# Patient Record
Sex: MALE | Race: WHITE | NOT HISPANIC OR LATINO | Employment: FULL TIME | URBAN - METROPOLITAN AREA
[De-identification: names, ages, dates, MRNs, and addresses within clinical notes are randomized per-mention and may not be internally consistent; named-entity substitution may affect disease eponyms.]

---

## 2017-01-24 DIAGNOSIS — M25.512 ACUTE PAIN OF LEFT SHOULDER: Primary | ICD-10-CM

## 2017-01-25 ENCOUNTER — HOSPITAL ENCOUNTER (OUTPATIENT)
Dept: RADIOLOGY | Facility: HOSPITAL | Age: 52
Discharge: HOME | End: 2017-01-25
Attending: ORTHOPAEDIC SURGERY
Payer: COMMERCIAL

## 2017-01-25 ENCOUNTER — OFFICE VISIT (OUTPATIENT)
Dept: ORTHOPEDICS | Facility: CLINIC | Age: 52
End: 2017-01-25
Payer: COMMERCIAL

## 2017-01-25 VITALS — WEIGHT: 130 LBS | BODY MASS INDEX: 19.26 KG/M2 | HEIGHT: 69 IN

## 2017-01-25 DIAGNOSIS — M25.512 ACUTE PAIN OF LEFT SHOULDER: ICD-10-CM

## 2017-01-25 DIAGNOSIS — M75.82 ROTATOR CUFF TENDINITIS, LEFT: Primary | ICD-10-CM

## 2017-01-25 PROCEDURE — 99203 OFFICE O/P NEW LOW 30 MIN: CPT | Mod: S$GLB,,, | Performed by: ORTHOPAEDIC SURGERY

## 2017-01-25 PROCEDURE — 73030 X-RAY EXAM OF SHOULDER: CPT | Mod: TC,PN,LT

## 2017-01-25 PROCEDURE — 99999 PR PBB SHADOW E&M-EST. PATIENT-LVL II: CPT | Mod: PBBFAC,,, | Performed by: ORTHOPAEDIC SURGERY

## 2017-01-25 PROCEDURE — 73030 X-RAY EXAM OF SHOULDER: CPT | Mod: 26,LT,, | Performed by: RADIOLOGY

## 2017-01-25 PROCEDURE — 1159F MED LIST DOCD IN RCRD: CPT | Mod: S$GLB,,, | Performed by: ORTHOPAEDIC SURGERY

## 2017-01-25 RX ORDER — NAPROXEN 500 MG/1
500 TABLET ORAL
COMMUNITY
End: 2017-04-18

## 2017-01-25 NOTE — MR AVS SNAPSHOT
"    [facility]  [address] [place] [address]  [place] [address]  Phone: [phone]                  Cornelius Couch   2017 10:00 AM   Office Visit    Description:  Male : 1965   Provider:  Cosme Ram MD   Department:  [facility]           Reason for Visit     Left Shoulder - Pain           Diagnoses this Visit        Comments    Rotator cuff tendinitis, left    -  Primary            To Do List           Future Appointments        Provider Department Dept Phone    3/8/2017 3:20 PM Navid Rivers MD [facility] [phone]      Goals (5 Years of Data)     None      Follow-Up and Disposition     Return if symptoms worsen or fail to improve.      [facility]     [facility] Nurse Care Line -  Assistance  Registered nurses in the [facility] Center provide clinical advisement, health education, appointment booking, and other advisory services.  Call for this free service at [phone].             Medications           Message regarding Medications     Verify the changes and/or additions to your medication regime listed below are the same as discussed with your clinician today.  If any of these changes or additions are incorrect, please notify your healthcare provider.             Verify that the below list of medications is an accurate representation of the medications you are currently taking.  If none reported, the list may be blank. If incorrect, please contact your healthcare provider. Carry this list with you in case of emergency.           Current Medications     naproxen (NAPROSYN) 500 MG tablet Take 500 mg by mouth as needed.           Clinical Reference Information           Vital Signs - Last Recorded  Most recent update: 2017 10:41 AM by Shannan Mcdonald LPN    Ht Wt BMI          5' 9" (1.753 m) 59 kg (130 lb) 19.2 kg/m2        Allergies as of 2017     No Known Allergies      Immunizations Administered on " Date of Encounter - 1/25/2017     None      [facility] Sign-Up     Activating your [facility] account is as easy as 1-2-3!     1) Visit [email or web address], select Sign Up Now, enter this activation code and your date of birth, then select Next.  [email or web address]  Expires: 3/11/2017 12:55 PM      2) Create a username and password to use when you visit [facility] in the future and select a security question in case you lose your password and select Next.    3) Enter your e-mail address and click Sign Up!    Additional Information  If you have questions, please e-mail [email or web address] or call [phone] to talk to our [facility] staff. Remember, [facility] is NOT to be used for urgent needs. For medical emergencies, dial 911.

## 2017-01-25 NOTE — PROGRESS NOTES
DATE: 1/25/2017  PATIENT: Cornelius Couch  REFERRING MD:   CHIEF COMPLAINT:   Chief Complaint   Patient presents with    Left Shoulder - Pain       HISTORY:  Cornelius Couch is a 51 y.o. right hand dominant male  who presents for initial evaluation of left shoulder pain.  States he hurt his shoulder to one half years ago when he was trimming a tree and he started to fall from a tree and he grabbed onto a branch to prevent himself from falling.  He did not note a pop or rip or tear but noted significant discomfort.  He was seen by Dr. cowan and an MRI was ordered which revealed partial rotator cuff tear and some degenerative changes in his humeral head as well as tendinosis of the long head of the biceps.  Physical therapy was recommended.  He did go to therapy and notes some improvement.  However, he never fully recovered.  He states his pain is slowly getting worse.  He denies any significant neck pain.  He denies any numbness, tingling to the left upper extremity.  Pain is reported at 4/10 today.    PAST MEDICAL/SURGICAL HISTORY:  History reviewed. No pertinent past medical history.  History reviewed. No pertinent past surgical history.    Current Medications:   Current Outpatient Prescriptions:     naproxen (NAPROSYN) 500 MG tablet, Take 500 mg by mouth as needed., Disp: , Rfl:     Social History:   Social History     Social History    Marital status:      Spouse name: N/A    Number of children: N/A    Years of education: N/A     Occupational History    Not on file.     Social History Main Topics    Smoking status: Not on file    Smokeless tobacco: Not on file    Alcohol use Not on file    Drug use: Not on file    Sexual activity: Not on file     Other Topics Concern    Not on file     Social History Narrative    No narrative on file       ROS:  Constitution: Negative for chills, fever, and sweats. Negative for unexplained weight loss.  HENT: Negative for headaches and blurry  "vision.   Cardiovascular: Negative for chest pain, irregular heartbeat, leg swelling and palpitations.   Respiratory: Negative for cough and shortness of breath.   Gastrointestinal: Negative for abdominal pain, heartburn, nausea and vomiting.   Genitourinary: Negative for bladder incontinence and dysuria.   Musculoskeletal: Negative for systemic arthritis, joint swelling, muscle weakness and myalgias.   Neurological: Negative for numbness.   Psychiatric/Behavioral: Negative for depression.   Endocrine: Negative for polyuria.   Hematologic/Lymphatic: Negative for bleeding disorders.  Skin: Negative for poor wound healing.       PHYSICAL EXAM:  Visit Vitals    Ht 5' 9" (1.753 m)    Wt 59 kg (130 lb)    BMI 19.2 kg/m2     Cornelius Couch is a well developed, well nourished male in no acute distress. Physical examination of the left shoulder evaluated the following:    Inspection, palpation and ROM of the cervical spine  Disc compression testing bilaterally  Inspection for swelling, ecchymosis, erythema, deformity and atrophy  Tenderness to palpation of the soft tissue and bony structures  Active and passive range of motion  Sensation of the shoulder and upper extremity  Motor strength in the deltoid, supraspinatus, internal rotators and external rotators  Impingement, apprehension, relocation and Speed's tests  Upper extremity vascular exam (skin temp,color, capillary refill)  Inspection for pseudomotor signs    Remarkable findings included:  Full range motion of cervical spine.  Negative disc compression sign.  No tenderness about the acromioclavicular joint or anterior acromion.  Range motion of the shoulder is full to forward elevation, abduction, internal and external rotation.  Sensation intact C5-T1.  Motor exam is 5/5 in the supraspinatus and external rotators.  However there is pain with resisted abduction testing.  No brianna impingement sign on exam.          IMAGING:   X-rays of the left shoulder " performed and reviewed.  No acute fractures or dislocations are seen.  No significant glenohumeral or acromioclavicular degenerative changes identified.  Type II acromion present.  MRI for from 2/18/15 is reviewed radiologist report shows a high-grade partial-thickness articular surface tear of the supraspinatus tendon, tendinosis along the biceps, moderate to advanced glenohumeral chondral thinning.  Unfortunately the actual MRI films are unavailable for review.     ASSESSMENT:   Partial-thickness rotator cuff tear left shoulder    PLAN:  The nature of the diagnosis, using models and diagrams when appropriate, was explained to the patient in detail.Treatment option discussed included observation, repeat MRI to rule out progression to a full-thickness rotator cuff tear, and arthroscopy and treatment of a partial thickness tear.. All questions answered and the patient wishes to observe his symptoms and modify his activities as he has some time off from work for the next month.  However, should his symptoms persist, I'll contact the office to schedule an MRI to rule out progression to a full-thickness rotator cuff tear.  Follow-up if not improving or worse..      This note was dictated using voice recognition software and may contain grammatical errors

## 2017-02-24 ENCOUNTER — TELEPHONE (OUTPATIENT)
Dept: FAMILY MEDICINE | Facility: CLINIC | Age: 52
End: 2017-02-24

## 2017-02-24 NOTE — TELEPHONE ENCOUNTER
Informed patient that Providers schedule has changed and will no longer be available after 2:40 p.m. Appointment time will need to be rescheduled. Patient informed/verbalized understanding of conversation and rescheduled per patient request at the time of call

## 2017-04-18 ENCOUNTER — DOCUMENTATION ONLY (OUTPATIENT)
Dept: FAMILY MEDICINE | Facility: CLINIC | Age: 52
End: 2017-04-18

## 2017-04-18 ENCOUNTER — LAB VISIT (OUTPATIENT)
Dept: LAB | Facility: HOSPITAL | Age: 52
End: 2017-04-18
Attending: FAMILY MEDICINE
Payer: COMMERCIAL

## 2017-04-18 ENCOUNTER — OFFICE VISIT (OUTPATIENT)
Dept: FAMILY MEDICINE | Facility: CLINIC | Age: 52
End: 2017-04-18
Payer: COMMERCIAL

## 2017-04-18 VITALS
WEIGHT: 131.38 LBS | TEMPERATURE: 98 F | HEIGHT: 69 IN | SYSTOLIC BLOOD PRESSURE: 134 MMHG | BODY MASS INDEX: 19.46 KG/M2 | DIASTOLIC BLOOD PRESSURE: 80 MMHG | RESPIRATION RATE: 18 BRPM | HEART RATE: 81 BPM

## 2017-04-18 DIAGNOSIS — Z00.00 ANNUAL PHYSICAL EXAM: ICD-10-CM

## 2017-04-18 DIAGNOSIS — Z00.00 ANNUAL PHYSICAL EXAM: Primary | ICD-10-CM

## 2017-04-18 DIAGNOSIS — Z23 IMMUNIZATION DUE: ICD-10-CM

## 2017-04-18 DIAGNOSIS — N40.0 BENIGN PROSTATIC HYPERPLASIA, PRESENCE OF LOWER URINARY TRACT SYMPTOMS UNSPECIFIED, UNSPECIFIED MORPHOLOGY: ICD-10-CM

## 2017-04-18 DIAGNOSIS — Z12.11 COLON CANCER SCREENING: ICD-10-CM

## 2017-04-18 LAB
25(OH)D3+25(OH)D2 SERPL-MCNC: 20 NG/ML
ALBUMIN SERPL BCP-MCNC: 3.7 G/DL
ALP SERPL-CCNC: 55 U/L
ALT SERPL W/O P-5'-P-CCNC: 15 U/L
ANION GAP SERPL CALC-SCNC: 9 MMOL/L
AST SERPL-CCNC: 19 U/L
BASOPHILS # BLD AUTO: 0.05 K/UL
BASOPHILS NFR BLD: 0.9 %
BILIRUB SERPL-MCNC: 0.2 MG/DL
BUN SERPL-MCNC: 11 MG/DL
CALCIUM SERPL-MCNC: 9.3 MG/DL
CHLORIDE SERPL-SCNC: 108 MMOL/L
CHOLEST/HDLC SERPL: 3.6 {RATIO}
CO2 SERPL-SCNC: 26 MMOL/L
CREAT SERPL-MCNC: 1.1 MG/DL
DIFFERENTIAL METHOD: ABNORMAL
EOSINOPHIL # BLD AUTO: 0.1 K/UL
EOSINOPHIL NFR BLD: 2.3 %
ERYTHROCYTE [DISTWIDTH] IN BLOOD BY AUTOMATED COUNT: 12.8 %
EST. GFR  (AFRICAN AMERICAN): >60 ML/MIN/1.73 M^2
EST. GFR  (NON AFRICAN AMERICAN): >60 ML/MIN/1.73 M^2
GLUCOSE SERPL-MCNC: 74 MG/DL
HCT VFR BLD AUTO: 42.2 %
HDL/CHOLESTEROL RATIO: 27.8 %
HDLC SERPL-MCNC: 194 MG/DL
HDLC SERPL-MCNC: 54 MG/DL
HGB BLD-MCNC: 13.7 G/DL
LDLC SERPL CALC-MCNC: 107.6 MG/DL
LYMPHOCYTES # BLD AUTO: 1.6 K/UL
LYMPHOCYTES NFR BLD: 27.6 %
MCH RBC QN AUTO: 30.6 PG
MCHC RBC AUTO-ENTMCNC: 32.5 %
MCV RBC AUTO: 94 FL
MONOCYTES # BLD AUTO: 0.5 K/UL
MONOCYTES NFR BLD: 9.5 %
NEUTROPHILS # BLD AUTO: 3.4 K/UL
NEUTROPHILS NFR BLD: 59.5 %
NONHDLC SERPL-MCNC: 140 MG/DL
PLATELET # BLD AUTO: 282 K/UL
PMV BLD AUTO: 10.5 FL
POTASSIUM SERPL-SCNC: 4.3 MMOL/L
PROT SERPL-MCNC: 6.6 G/DL
RBC # BLD AUTO: 4.47 M/UL
SODIUM SERPL-SCNC: 143 MMOL/L
TRIGL SERPL-MCNC: 162 MG/DL
TSH SERPL DL<=0.005 MIU/L-ACNC: 1.76 UIU/ML
WBC # BLD AUTO: 5.68 K/UL

## 2017-04-18 PROCEDURE — 82306 VITAMIN D 25 HYDROXY: CPT

## 2017-04-18 PROCEDURE — 86780 TREPONEMA PALLIDUM: CPT

## 2017-04-18 PROCEDURE — 80053 COMPREHEN METABOLIC PANEL: CPT

## 2017-04-18 PROCEDURE — 36415 COLL VENOUS BLD VENIPUNCTURE: CPT | Mod: PO

## 2017-04-18 PROCEDURE — 90715 TDAP VACCINE 7 YRS/> IM: CPT | Mod: S$GLB,,, | Performed by: FAMILY MEDICINE

## 2017-04-18 PROCEDURE — 1160F RVW MEDS BY RX/DR IN RCRD: CPT | Mod: S$GLB,,, | Performed by: FAMILY MEDICINE

## 2017-04-18 PROCEDURE — 86592 SYPHILIS TEST NON-TREP QUAL: CPT

## 2017-04-18 PROCEDURE — 80061 LIPID PANEL: CPT

## 2017-04-18 PROCEDURE — 86593 SYPHILIS TEST NON-TREP QUANT: CPT

## 2017-04-18 PROCEDURE — 99203 OFFICE O/P NEW LOW 30 MIN: CPT | Mod: 25,S$GLB,, | Performed by: FAMILY MEDICINE

## 2017-04-18 PROCEDURE — 84443 ASSAY THYROID STIM HORMONE: CPT

## 2017-04-18 PROCEDURE — 93010 ELECTROCARDIOGRAM REPORT: CPT | Mod: S$GLB,,, | Performed by: INTERNAL MEDICINE

## 2017-04-18 PROCEDURE — 86803 HEPATITIS C AB TEST: CPT

## 2017-04-18 PROCEDURE — 93005 ELECTROCARDIOGRAM TRACING: CPT | Mod: S$GLB,,, | Performed by: FAMILY MEDICINE

## 2017-04-18 PROCEDURE — 85025 COMPLETE CBC W/AUTO DIFF WBC: CPT

## 2017-04-18 PROCEDURE — 86703 HIV-1/HIV-2 1 RESULT ANTBDY: CPT

## 2017-04-18 PROCEDURE — 99999 PR PBB SHADOW E&M-EST. PATIENT-LVL III: CPT | Mod: PBBFAC,,, | Performed by: FAMILY MEDICINE

## 2017-04-18 PROCEDURE — 90471 IMMUNIZATION ADMIN: CPT | Mod: S$GLB,,, | Performed by: FAMILY MEDICINE

## 2017-04-18 PROCEDURE — 83036 HEMOGLOBIN GLYCOSYLATED A1C: CPT

## 2017-04-18 RX ORDER — TAMSULOSIN HYDROCHLORIDE 0.4 MG/1
0.4 CAPSULE ORAL DAILY
Qty: 30 CAPSULE | Refills: 1 | Status: SHIPPED | OUTPATIENT
Start: 2017-04-18 | End: 2017-10-22

## 2017-04-18 NOTE — MR AVS SNAPSHOT
[facility]  [address]  [place] [address]  Phone: [phone]  Fax: [phone]                  Cornelius Couch   2017 1:40 PM   Office Visit    Description:  Male : 1965   Provider:  Navid Rivers MD   Department:  [place] [facility]           Reason for Visit     Establish Care     Shoulder Pain           Diagnoses this Visit        Comments    Annual physical exam    -  Primary     Benign prostatic hyperplasia, presence of lower urinary tract symptoms unspecified, unspecified morphology         Colon cancer screening         Immunization due                To Do List           Future Appointments        Provider Department Dept Phone    2017 2:30 PM MANOLO PRASHANTH [facility] [phone]    2017 9:00 AM Navid Rivers MD [facility] [phone]      Goals (5 Years of Data)     None       These Medications        Disp Refills Start End    tamsulosin (FLOMAX) 0.4 mg Cp24 30 capsule 1 2017    Take 1 capsule (0.4 mg total) by mouth once daily. - Oral    Pharmacy: [id number] - [place] - [address] [facility] #: [phone]         [facility]     [facility] On Call Nurse Care Line -  Assistance  Unless otherwise directed by your provider, please contact [facility], our nurse care line that is available for  assistance.     Registered nurses in the [facility] provide: appointment scheduling, clinical advisement, health education, and other advisory services.  Call: [phone] (toll free)               Medications           Message regarding Medications     Verify the changes and/or additions to your medication regime listed below are the same as discussed with your clinician today.  If any of these changes or additions are incorrect, please notify your healthcare provider.        START taking these NEW medications        Refills    tamsulosin (FLOMAX) 0.4 mg Cp24 1  "   Sig: Take 1 capsule (0.4 mg total) by mouth once daily.    Class: Normal    Route: Oral      STOP taking these medications     naproxen (NAPROSYN) 500 MG tablet Take 500 mg by mouth as needed.           Verify that the below list of medications is an accurate representation of the medications you are currently taking.  If none reported, the list may be blank. If incorrect, please contact your healthcare provider. Carry this list with you in case of emergency.           Current Medications     tamsulosin (FLOMAX) 0.4 mg Cp24 Take 1 capsule (0.4 mg total) by mouth once daily.           Clinical Reference Information           Your Vitals Were     BP Pulse Temp Resp Height Weight    134/80 (BP Location: Right arm, Patient Position: Sitting, BP Method: Automatic) 81 98.1 °F (36.7 °C) (Oral) 18 5' 9" (1.753 m) 59.6 kg (131 lb 6.3 oz)    BMI                19.4 kg/m2          Blood Pressure          Most Recent Value    BP  134/80      Allergies as of 4/18/2017     Codeine      Immunizations Administered on Date of Encounter - 4/18/2017     Name Date Dose VIS Date Route    TDAP 4/18/2017 0.5 mL 2/24/2015 Intramuscular      Orders Placed During Today's Visit      Normal Orders This Visit    Ambulatory referral to Gastroenterology     IN OFFICE EKG 12-LEAD (to Muse)     Tdap Vaccine     Future Labs/Procedures Expected by Expires    CBC auto differential  4/18/2017 6/17/2018    Comprehensive metabolic panel  4/18/2017 6/17/2018    Hemoglobin A1c  4/18/2017 6/17/2018    Hepatitis C antibody  4/18/2017 6/17/2018    HIV-1 and HIV-2 antibodies  4/18/2017 6/17/2018    Lipid panel  4/18/2017 6/17/2018    RPR  4/18/2017 6/17/2018    TSH  4/18/2017 6/17/2018    Vitamin D  4/18/2017 6/17/2018      Language Assistance Services     ATTENTION: Language assistance services are available, free of charge. Please call [phone].      ATENCIÓN: Si habla español, tiene a person disposición servicios gratuitos de asistencia lingüística. " Larry vizcarra [phone].     LOAN Ý: N?u b?n nói Ti?ng Vi?t, có các d?ch v? h? tr? ngôn ng? mi?n phí dành cho b?n. G?i s? [phone].         [facility] complies with applicable Federal civil rights laws and does not discriminate on the basis of race, color, national origin, age, disability, or sex.

## 2017-04-18 NOTE — PROGRESS NOTES
Pre-Visit Chart Review  For Appointment Scheduled on 4/18/17.      Health Maintenance Due   Topic Date Due    Hepatitis C Screening  1965    Lipid Panel  1965    TETANUS VACCINE  10/23/1982    Colonoscopy  10/23/2014    Influenza Vaccine  08/01/2016

## 2017-04-19 LAB
ESTIMATED AVG GLUCOSE: 114 MG/DL
HBA1C MFR BLD HPLC: 5.6 %
HCV AB SERPL QL IA: NEGATIVE
HIV 1+2 AB+HIV1 P24 AG SERPL QL IA: NEGATIVE
RPR SER QL: REACTIVE
RPR SER-TITR: ABNORMAL {TITER}

## 2017-04-24 NOTE — PROGRESS NOTES
"[facility]  Progress Note    Subjective:       Patient ID: Cornelius Couch is a 51 y.o. male.    Chief Complaint: groin discomfort    HPI51 y.o.male is here today to establish care.  Patient is currently complaining of groin discomfort.  Patient has decreased urinary stream increased urinary frequency and incomplete bladder emptying.  Patient has had symptoms for the past few months.  Symptoms have been progressing and severity.  Patient has not started on any medications for possible of BPH.  No further complaints at the current time.  Review of Systems   Constitutional: Negative for chills and fever.   HENT: Negative for congestion.    Eyes: Negative for pain.   Respiratory: Negative for shortness of breath and wheezing.    Cardiovascular: Negative for chest pain, palpitations and leg swelling.   Gastrointestinal: Negative for abdominal pain, nausea and vomiting.   Genitourinary: Positive for difficulty urinating, frequency and urgency.   Musculoskeletal: Negative for arthralgias, gait problem and myalgias.   Skin: Negative for rash.   Neurological: Negative for seizures.       Objective:      Vitals:    04/18/17 1351   BP: 134/80   BP Location: Right arm   Patient Position: Sitting   BP Method: Automatic   Pulse: 81   Resp: 18   Temp: 98.1 °F (36.7 °C)   TempSrc: Oral   Weight: 59.6 kg (131 lb 6.3 oz)   Height: 5' 9" (1.753 m)     Body mass index is 19.4 kg/(m^2).  Physical Exam   Constitutional: He is oriented to person, place, and time. He appears well-developed and well-nourished.   HENT:   Head: Normocephalic and atraumatic.   Eyes: Conjunctivae and EOM are normal. Pupils are equal, round, and reactive to light.   Neck: Normal range of motion. Neck supple. No JVD present.   Cardiovascular: Normal rate, regular rhythm, normal heart sounds and intact distal pulses.  Exam reveals no gallop and no friction rub.    No murmur heard.  Pulmonary/Chest: Effort normal and breath sounds normal. No " respiratory distress. He has no wheezes.   Abdominal: Soft. Bowel sounds are normal. There is no tenderness.   Musculoskeletal: Normal range of motion.   Neurological: He is alert and oriented to person, place, and time. No cranial nerve deficit.   Skin: Skin is warm and dry.   Psychiatric: He has a normal mood and affect. His behavior is normal. Judgment and thought content normal.   Nursing note and vitals reviewed.      Assessment:       1. Annual physical exam    2. Benign prostatic hyperplasia, presence of lower urinary tract symptoms unspecified, unspecified morphology    3. Colon cancer screening    4. Immunization due        Plan:       Annual physical exam  -     CBC auto differential; Future; Expected date: 4/18/17  -     Comprehensive metabolic panel; Future; Expected date: 4/18/17  -     Hemoglobin A1c; Future; Expected date: 4/18/17  -     Hepatitis C antibody; Future; Expected date: 4/18/17  -     HIV-1 and HIV-2 antibodies; Future; Expected date: 4/18/17  -     Lipid panel; Future; Expected date: 4/18/17  -     RPR; Future; Expected date: 4/18/17  -     TSH; Future; Expected date: 4/18/17  -     Vitamin D; Future; Expected date: 4/18/17  -     IN OFFICE EKG 12-LEAD (to Muse)    Benign prostatic hyperplasia, presence of lower urinary tract symptoms unspecified, unspecified morphology  -     tamsulosin (FLOMAX) 0.4 mg Cp24; Take 1 capsule (0.4 mg total) by mouth once daily.  Dispense: 30 capsule; Refill: 1    Colon cancer screening  -     Ambulatory referral to Gastroenterology    Immunization due  -     Tdap Vaccine      Return in about 4 weeks (around 5/16/2017).  Navid Rievrs MD  [facility]  4/24/2017 7:42 AM

## 2017-04-27 LAB — T PALLIDUM AB SER QL IF: NORMAL

## 2017-10-22 ENCOUNTER — HOSPITAL ENCOUNTER (EMERGENCY)
Facility: HOSPITAL | Age: 52
Discharge: HOME | End: 2017-10-22
Attending: EMERGENCY MEDICINE
Payer: MEDICAID

## 2017-10-22 VITALS
SYSTOLIC BLOOD PRESSURE: 156 MMHG | BODY MASS INDEX: 19.9 KG/M2 | OXYGEN SATURATION: 100 % | WEIGHT: 139 LBS | HEIGHT: 70 IN | HEART RATE: 75 BPM | RESPIRATION RATE: 20 BRPM | TEMPERATURE: 98 F | DIASTOLIC BLOOD PRESSURE: 89 MMHG

## 2017-10-22 DIAGNOSIS — S90.559A: Primary | ICD-10-CM

## 2017-10-22 PROCEDURE — 99283 EMERGENCY DEPT VISIT LOW MDM: CPT | Mod: 25

## 2017-10-22 PROCEDURE — 25000003 PHARM REV CODE 250: Performed by: EMERGENCY MEDICINE

## 2017-10-22 PROCEDURE — 10120 INC&RMVL FB SUBQ TISS SMPL: CPT | Mod: LT

## 2017-10-22 RX ORDER — LIDOCAINE HYDROCHLORIDE 10 MG/ML
INJECTION, SOLUTION EPIDURAL; INFILTRATION; INTRACAUDAL; PERINEURAL
Status: DISCONTINUED
Start: 2017-10-22 | End: 2017-10-22 | Stop reason: HOSPADM

## 2017-10-22 RX ORDER — LIDOCAINE HYDROCHLORIDE 10 MG/ML
10 INJECTION INFILTRATION; PERINEURAL
Status: COMPLETED | OUTPATIENT
Start: 2017-10-22 | End: 2017-10-22

## 2017-10-22 RX ORDER — CLINDAMYCIN HYDROCHLORIDE 150 MG/1
300 CAPSULE ORAL 4 TIMES DAILY
Qty: 40 CAPSULE | Refills: 0 | Status: SHIPPED | OUTPATIENT
Start: 2017-10-22 | End: 2017-10-29

## 2017-10-22 RX ORDER — HYDROCODONE BITARTRATE AND ACETAMINOPHEN 5; 325 MG/1; MG/1
1 TABLET ORAL EVERY 4 HOURS PRN
Qty: 6 TABLET | Refills: 0 | Status: SHIPPED | OUTPATIENT
Start: 2017-10-22 | End: 2018-09-17 | Stop reason: ALTCHOICE

## 2017-10-22 RX ADMIN — LIDOCAINE HYDROCHLORIDE 10 ML: 10 INJECTION, SOLUTION EPIDURAL; INFILTRATION; INTRACAUDAL; PERINEURAL at 05:10
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
## 2017-11-30 DIAGNOSIS — Z12.11 COLON CANCER SCREENING: ICD-10-CM

## 2018-09-17 ENCOUNTER — LAB VISIT (OUTPATIENT)
Dept: LAB | Facility: HOSPITAL | Age: 53
End: 2018-09-17
Attending: NURSE PRACTITIONER
Payer: MEDICAID

## 2018-09-17 ENCOUNTER — OFFICE VISIT (OUTPATIENT)
Dept: FAMILY MEDICINE | Facility: CLINIC | Age: 53
End: 2018-09-17
Payer: MEDICAID

## 2018-09-17 ENCOUNTER — TELEPHONE (OUTPATIENT)
Dept: FAMILY MEDICINE | Facility: CLINIC | Age: 53
End: 2018-09-17

## 2018-09-17 VITALS
DIASTOLIC BLOOD PRESSURE: 75 MMHG | HEART RATE: 64 BPM | HEIGHT: 70 IN | WEIGHT: 140 LBS | BODY MASS INDEX: 20.04 KG/M2 | SYSTOLIC BLOOD PRESSURE: 125 MMHG | TEMPERATURE: 98 F

## 2018-09-17 DIAGNOSIS — E55.9 VITAMIN D DEFICIENCY: ICD-10-CM

## 2018-09-17 DIAGNOSIS — R35.0 BENIGN PROSTATIC HYPERPLASIA WITH URINARY FREQUENCY: ICD-10-CM

## 2018-09-17 DIAGNOSIS — Z12.11 COLON CANCER SCREENING: ICD-10-CM

## 2018-09-17 DIAGNOSIS — D64.9 ANEMIA, UNSPECIFIED TYPE: ICD-10-CM

## 2018-09-17 DIAGNOSIS — A53.0 POSITIVE RPR TEST: Primary | ICD-10-CM

## 2018-09-17 DIAGNOSIS — Z00.00 ANNUAL PHYSICAL EXAM: ICD-10-CM

## 2018-09-17 DIAGNOSIS — M77.11 RIGHT LATERAL EPICONDYLITIS: ICD-10-CM

## 2018-09-17 DIAGNOSIS — Z00.00 ANNUAL PHYSICAL EXAM: Primary | ICD-10-CM

## 2018-09-17 DIAGNOSIS — N40.1 BENIGN PROSTATIC HYPERPLASIA WITH URINARY FREQUENCY: ICD-10-CM

## 2018-09-17 LAB
25(OH)D3+25(OH)D2 SERPL-MCNC: 14 NG/ML
ALBUMIN SERPL BCP-MCNC: 4 G/DL
ALP SERPL-CCNC: 49 U/L
ALT SERPL W/O P-5'-P-CCNC: 13 U/L
ANION GAP SERPL CALC-SCNC: 6 MMOL/L
AST SERPL-CCNC: 17 U/L
BASOPHILS # BLD AUTO: 0.06 K/UL
BASOPHILS NFR BLD: 1.2 %
BILIRUB SERPL-MCNC: 0.4 MG/DL
BUN SERPL-MCNC: 10 MG/DL
CALCIUM SERPL-MCNC: 9.4 MG/DL
CHLORIDE SERPL-SCNC: 107 MMOL/L
CO2 SERPL-SCNC: 28 MMOL/L
COMPLEXED PSA SERPL-MCNC: 0.83 NG/ML
CREAT SERPL-MCNC: 1 MG/DL
DIFFERENTIAL METHOD: ABNORMAL
EOSINOPHIL # BLD AUTO: 0.1 K/UL
EOSINOPHIL NFR BLD: 2.3 %
ERYTHROCYTE [DISTWIDTH] IN BLOOD BY AUTOMATED COUNT: 12.6 %
EST. GFR  (AFRICAN AMERICAN): >60 ML/MIN/1.73 M^2
EST. GFR  (NON AFRICAN AMERICAN): >60 ML/MIN/1.73 M^2
GLUCOSE SERPL-MCNC: 94 MG/DL
HCT VFR BLD AUTO: 42.4 %
HGB BLD-MCNC: 13.7 G/DL
IMM GRANULOCYTES # BLD AUTO: 0.02 K/UL
IMM GRANULOCYTES NFR BLD AUTO: 0.4 %
LYMPHOCYTES # BLD AUTO: 1.7 K/UL
LYMPHOCYTES NFR BLD: 32.5 %
MCH RBC QN AUTO: 30.6 PG
MCHC RBC AUTO-ENTMCNC: 32.3 G/DL
MCV RBC AUTO: 95 FL
MONOCYTES # BLD AUTO: 0.5 K/UL
MONOCYTES NFR BLD: 9.5 %
NEUTROPHILS # BLD AUTO: 2.8 K/UL
NEUTROPHILS NFR BLD: 54.1 %
NRBC BLD-RTO: 0 /100 WBC
PLATELET # BLD AUTO: 277 K/UL
PMV BLD AUTO: 10.7 FL
POTASSIUM SERPL-SCNC: 4.1 MMOL/L
PROT SERPL-MCNC: 6.8 G/DL
RBC # BLD AUTO: 4.47 M/UL
SODIUM SERPL-SCNC: 141 MMOL/L
WBC # BLD AUTO: 5.14 K/UL

## 2018-09-17 PROCEDURE — 99214 OFFICE O/P EST MOD 30 MIN: CPT | Mod: PBBFAC,PO | Performed by: NURSE PRACTITIONER

## 2018-09-17 PROCEDURE — 84153 ASSAY OF PSA TOTAL: CPT

## 2018-09-17 PROCEDURE — 85025 COMPLETE CBC W/AUTO DIFF WBC: CPT

## 2018-09-17 PROCEDURE — 99396 PREV VISIT EST AGE 40-64: CPT | Mod: S$PBB,,, | Performed by: NURSE PRACTITIONER

## 2018-09-17 PROCEDURE — 80053 COMPREHEN METABOLIC PANEL: CPT

## 2018-09-17 PROCEDURE — 99999 PR PBB SHADOW E&M-EST. PATIENT-LVL IV: CPT | Mod: PBBFAC,,, | Performed by: NURSE PRACTITIONER

## 2018-09-17 PROCEDURE — 82306 VITAMIN D 25 HYDROXY: CPT

## 2018-09-17 PROCEDURE — 36415 COLL VENOUS BLD VENIPUNCTURE: CPT | Mod: PO

## 2018-09-17 RX ORDER — NAPROXEN 500 MG/1
500 TABLET ORAL 2 TIMES DAILY WITH MEALS
Qty: 30 TABLET | Refills: 0 | Status: SHIPPED | OUTPATIENT
Start: 2018-09-17 | End: 2019-02-14

## 2018-09-17 NOTE — PROGRESS NOTES
Subjective:       Patient ID: Cornelius Couch is a 52 y.o. male.    Chief Complaint: Annual Exam    Chief Complaint  Chief Complaint   Patient presents with    Annual Exam       HPI  Cornelius Couch is a 52 y.o. male with medical diagnoses as listed in the medical history and problem list that presents today for annual exam. Patient with complaint of pain to right elbow that started 2 months ago. Patient rates pain as a 3 on a 0-10 scale and when patient accidentally hits it on something its a 9 on a scaled of 0-10. Patient describes the pain as a dull constant pain with intermittent sharp pain with tenderness to palpation over lateral elbow. Patient reports stretching and moving elbow around makes it better and hitting it on something makes it worse. Denies taking medicine for pain. Symptoms are constantly intermittent.  Patient has been getting blood pressure checked at dental appointments with a log of blood pressure showing elevated readings 139/89, 147/97, 150/92, and 154/102 on 4 separate occasions. Blood pressure today is 125/75. BMI: 20.09 and has gained 9 pounds since 04/18/2017.       PAST MEDICAL HISTORY:  History reviewed. No pertinent past medical history.    PAST SURGICAL HISTORY:  Past Surgical History:   Procedure Laterality Date    bone fusion Left 1977    ankle    FRACTURE SURGERY      finger left pinky,     TONSILLECTOMY         SOCIAL HISTORY:  Social History     Socioeconomic History    Marital status:      Spouse name: Not on file    Number of children: Not on file    Years of education: Not on file    Highest education level: Not on file   Social Needs    Financial resource strain: Not on file    Food insecurity - worry: Not on file    Food insecurity - inability: Not on file    Transportation needs - medical: Not on file    Transportation needs - non-medical: Not on file   Occupational History    Not on file   Tobacco Use    Smoking status: Former Smoker     Types:  Cigarettes     Last attempt to quit: 2009     Years since quittin.4    Smokeless tobacco: Former User   Substance and Sexual Activity    Alcohol use: Yes     Alcohol/week: 0.6 oz     Types: 1 Cans of beer per week    Drug use: No    Sexual activity: Not on file   Other Topics Concern    Not on file   Social History Narrative    Not on file       FAMILY HISTORY:  Family History   Problem Relation Age of Onset    Alzheimer's disease Mother     Heart disease Father        ALLERGIES AND MEDICATIONS: updated and reviewed.  Review of patient's allergies indicates:   Allergen Reactions    Codeine Itching     Anything with codeine     Current Outpatient Medications   Medication Sig Dispense Refill    naproxen (NAPROSYN) 500 MG tablet Take 1 tablet (500 mg total) by mouth 2 (two) times daily with meals. 30 tablet 0     No current facility-administered medications for this visit.        I have reviewed the patient's medical history in detail and updated the computerized patient record.    Review of Systems   Constitutional: Positive for fatigue. Negative for activity change, appetite change, chills, diaphoresis, fever and unexpected weight change.        Constantly active at work and walks dogs. Occasional fatigue worse at night.    HENT: Positive for dental problem. Negative for congestion, nosebleeds, postnasal drip, rhinorrhea, sinus pressure, sinus pain, sneezing, sore throat and tinnitus.         Sees dentist in [place] for root canals and other dental procedures. Last appointment on  18   Eyes: Positive for visual disturbance. Negative for photophobia, discharge and itching.        Reports worsening vision. Wears glasses. Last eye exam .    Respiratory: Negative for choking, chest tightness, shortness of breath and wheezing.         Quit smoking in .    Cardiovascular: Negative for chest pain, palpitations and leg swelling.   Gastrointestinal: Negative for abdominal pain, constipation,  "diarrhea, nausea and vomiting.   Genitourinary: Positive for difficulty urinating, flank pain and urgency. Negative for frequency, hematuria, scrotal swelling and testicular pain.        Reports having an increase in urgency to urinate and feels like he doesn't empty his bladder all the way some times. Patient has a history of prostatitis but does not feel that his symptoms are the same as with prostatitis. Flank pain when doesn't drink enough water.    Musculoskeletal: Positive for arthralgias and joint swelling. Negative for back pain, myalgias, neck pain and neck stiffness.        Pain to right elbow with occasional swelling.    Skin: Negative for rash and wound.   Neurological: Positive for dizziness. Negative for syncope, weakness, light-headedness, numbness and headaches.        Reports intermittent dizziness when patient goes from kneeling or squatting to standing position.    Hematological: Does not bruise/bleed easily.   Psychiatric/Behavioral: Positive for sleep disturbance. Negative for agitation, dysphoric mood and suicidal ideas. The patient is not nervous/anxious.         Has hard time falling asleep but once asleep he can sleep through the night.          Objective:      Vitals:    09/17/18 1405   BP: 125/75   BP Location: Right arm   Patient Position: Sitting   BP Method: Large (Automatic)   Pulse: 64   Temp: 98.4 °F (36.9 °C)   TempSrc: Oral   Weight: 63.5 kg (140 lb)   Height: 5' 10" (1.778 m)     Physical Exam   Constitutional: He is oriented to person, place, and time. Vital signs are normal. He appears well-developed and well-nourished. No distress.   BP: 125/75   HENT:   Head: Normocephalic and atraumatic.   Right Ear: Tympanic membrane, external ear and ear canal normal.   Left Ear: Tympanic membrane, external ear and ear canal normal.   Nose: Nose normal. No mucosal edema.   Mouth/Throat: Uvula is midline, oropharynx is clear and moist and mucous membranes are normal. No oropharyngeal " exudate.   Eyes: Conjunctivae and lids are normal. Pupils are equal, round, and reactive to light. Right eye exhibits no discharge. Left eye exhibits no discharge. Right conjunctiva is not injected. Left conjunctiva is not injected.   Neck: Normal range of motion. Neck supple. Normal carotid pulses present. Carotid bruit is not present.   Cardiovascular: Normal rate, regular rhythm, S1 normal, S2 normal, normal heart sounds, intact distal pulses and normal pulses.   No murmur heard.  Pulses:       Carotid pulses are 2+ on the right side, and 2+ on the left side.       Radial pulses are 2+ on the right side, and 2+ on the left side.        Posterior tibial pulses are 2+ on the right side, and 2+ on the left side.   No edema noted.    Pulmonary/Chest: Effort normal and breath sounds normal. No respiratory distress. He has no decreased breath sounds. He has no wheezes. He has no rhonchi. He has no rales.   Abdominal: Soft. Normal appearance, normal aorta and bowel sounds are normal. He exhibits no distension, no abdominal bruit, no pulsatile midline mass and no mass. There is no hepatosplenomegaly. There is no tenderness. No hernia.   Musculoskeletal: Normal range of motion. He exhibits no edema or deformity.        Right forearm: He exhibits tenderness and bony tenderness. He exhibits no swelling, no edema and no deformity.   Lymphadenopathy:        Head (right side): No submental, no submandibular, no tonsillar, no preauricular, no posterior auricular and no occipital adenopathy present.        Head (left side): No submental, no submandibular, no tonsillar, no preauricular, no posterior auricular and no occipital adenopathy present.     He has no cervical adenopathy.        Right: No supraclavicular adenopathy present.        Left: No supraclavicular adenopathy present.   Neurological: He is alert and oriented to person, place, and time. He has normal strength.   Skin: Skin is warm, dry and intact. No rash noted. He  is not diaphoretic. No erythema. No pallor.   Psychiatric: He has a normal mood and affect. His speech is normal and behavior is normal. Judgment and thought content normal. His mood appears not anxious. Cognition and memory are normal. He does not exhibit a depressed mood.   Nursing note and vitals reviewed.        Assessment:       1. Annual physical exam    2. Benign prostatic hyperplasia with urinary frequency    3. Anemia, unspecified type    4. Vitamin D deficiency    5. Right lateral epicondylitis    6. Colon cancer screening    7. BMI 20.0-20.9, adult          Plan:       Cornelius was seen today for annual exam.  Patient blood work results from April 2017 reviewed. Patient had a positive RPR, negative FTA IGG and IGM with no notification of results and no further testing.  I will look into this and get back to the patient with any needed follow up.   Diagnoses and all orders for this visit:    Annual physical exam  -     CBC auto differential; Future  -     Comprehensive metabolic panel; Future  - Discussed healthy diet, regular exercise, necessary labs, age appropriate cancer screening, and routine vaccinations.  Declines flu immunization today.    Benign prostatic hyperplasia with urinary frequency  -     Prostate Specific Antigen, Diagnostic; Future  - History of prostatitis, no acute symptoms at this time.     Anemia, unspecified type  -     CBC auto differential; Future  -   Lab Results   Component Value Date    WBC 5.68 04/18/2017    HGB 13.7 (L) 04/18/2017    HCT 42.2 04/18/2017    MCV 94 04/18/2017     04/18/2017       Vitamin D deficiency  -     Vitamin D; Future  - Vitamin D level 4/18/17 was 20 and patient did not treat, will recheck and treat if indicated    Right lateral epicondylitis  -     naproxen (NAPROSYN) 500 MG tablet; Take 1 tablet (500 mg total) by mouth 2 (two) times daily with meals.  - Advised tennis elbow strap during the day, remove and apply ice for 20 minutes at  HS  - Educational handouts provided. Tennis Elbow; Understanding lateral epicondylitis, Treating tennis elbow  - If no improvement with conservative treatment will refer to Physical Medicine and Rehabilitation for additional treatment    Colon cancer screening  -     Ambulatory referral to Gastroenterology    BMI 20.0-20.9, adult   BMI 20.09 with 9 pound weight gain since visit 4/18/17   Maintain healthy weight with heathy diet and exercise/active lifestyle        Follow-up in about 1 year (around 9/17/2019) for Annual, sooner if needed.

## 2018-09-17 NOTE — PATIENT INSTRUCTIONS
Tennis Elbow  Muscles connect to bones by thick, fibrous cords (tendons). When the muscles are overused by repeated motion, the tendons may become inflamed and painful. This condition is called tendonitis.  Tennis elbow (lateral epicondylitis) is a form of tendonitis. It occurs when the forearm muscles are used again and again in a twisting motion. Pain from tennis elbow occurs mainly on the outside of the elbow. But the pain can spread into the forearm and wrist. Your elbow may also be swollen and tender to the touch.  The pain may get worse when you move your arm or do simple activities. Bending your wrist back, shaking hands, or turning a doorknob may cause pain. The pain often gets worse after several weeks or months. Sometimes you may feel pain when your arm is still.  Tennis players who use a backhand stroke with poor technique are more likely to get tennis elbow. But playing tennis is only one cause of tennis elbow. Other common activities that can cause it include:  · Hammering  · Painting  · Raking  Besides tennis players, people at risk include , gardeners, musicians, and dentists. Sometimes people get tennis elbow without doing anything that would cause the injury.  Treatment includes resting the arm and taking anti-inflammatory medicines. Special splints help ease symptoms. Symptoms should get better after 4 to 6 weeks of rest. You may need steroid injections if resting and using a splint dont help. After the pain is relieved, you should change your activities so the symptoms dont return. You may need physical therapy. It may include stretching, range-of-motion, and strengthening exercises. These treatments help most cases. You may need surgery if your symptoms continue for 6 months.  Home care  Follow these guidelines when caring for yourself at home:  · Rest your elbow as needed. Protect it from movement that causes pain. You may be told to use a forearm splint at night to ease symptoms  in the morning. Your health care provider may recommend a special wrap or splint to compress the muscles of the forearm. This can ease pain during daytime activities. As your symptoms get better, start to move your elbow more.  · Put an ice pack on the injured area. Do this for 20 minutes every 1 to 2 hours the first day for pain relief. You can make an ice pack by wrapping a plastic bag of ice cubes in a thin towel. Continue using the ice pack 3 to 4 times a day for the next 2 days. Then use the ice pack as needed to ease pain and swelling.  · You may use acetaminophen or ibuprofen to control pain, unless another pain medicine was prescribed. If you have chronic liver or kidney disease, talk with your health care provider before using these medicines. Also talk with your provider if youve had a stomach ulcer or GI bleeding.  · After your elbow heals, avoid the motion that caused your pain. Or learn to move in a way that causes less stress on the tendon. Using a forearm wrap may keep tennis elbow from happening again.  · A tennis elbow strap may ease pain and keep you from further injury when you start playing tennis again. You can also lower your risk for injury by warming up before you play and cooling down afterward. You should also use the right equipment. For instance, make sure your racquet has the right  and is the right size for you.  Follow-up care  Follow up with your health care provider, or as advised, if your symptoms dont get better after 1 week of treatment.  When to seek medical advice  Call your health care provider right away if any of these occur:  · Redness over the painful area  · Pain or swelling at the elbow gets worse  · Any numbness or tingling in your arm, hands, or fingers  · Unexplained fever over 101ºF (37.8ºC)   Date Last Reviewed: 2/17/2015  © 2457-1291 [employer]. [address] [place] [address], [place] [address]. All rights reserved. This information is not intended as a  substitute for professional medical care. Always follow your healthcare professional's instructions.        Understanding Lateral Epicondylitis    Tendons are strong bands of tissue that connect muscles to bones. Lateral epicondylitis affects the tendons that connect muscles in the forearm to the lateral epicondyle. This is the bony knob on the outer side of the elbow. The condition occurs if the extensor tendons of the wrist become red and swollen (irritated). This can cause pain in the elbow, forearm, and wrist. Because the condition is sometimes caused by playing tennis, it is also known as tennis elbow.  How to say it  [id number]-[id number]-[id number]   What causes lateral epicondylitis?  The condition most often occurs because of overuse. This can be from any activity that repeatedly puts stress on the forearm extensor muscles or tendons and wrist. For instance, playing tennis, lifting weights, cutting meat, painting, and typing can all cause the condition. Wear and tear of the tendons from aging or an injury to the tendons can also cause the condition.  Symptoms of lateral epicondylitis  The most common symptom is pain. You may feel it on the outer side of the elbow and down the back of the forearm. It may be worse when moving or using the elbow, forearm, or wrist. You may also feel pain when gripping or lifting things.  Treatment for lateral epicondylitis  Treatments may include:  · Resting the elbow, forearm, and wrist. Youll need to avoid movements that can make your symptoms worse. You also may need to avoid certain sports and types of work for a time. This helps relieve symptoms and prevent further damage to the tendons.  · Changing the action that caused the problem. For instance, if the tendons were damaged from playing tennis, it may help to change your playing technique or use different equipment. This helps prevent further damage to the tendons.  · Using cold packs. Putting an ice pack on the  injured area can help reduce pain and swelling.  · Taking pain medicines. Taking prescription or over-the-counter pain medicines may help reduce pain and swelling.    · Wearing a brace. This helps reduce strain on the muscles and tendons in the forearm, which may relieve symptoms. It is very important to wear the brace properly.  · Doing exercises and physical therapy. These help improve strength and range of motion in the elbow, forearm, and wrist.  · Getting shots of medicine into the injured area. These may help relieve symptoms for a time.  · Having surgery. This may be an option if other treatments fail to relieve symptoms. In many cases, the surgeon removes the damaged tissue.  Possible complications of lateral epicondylitis  If the tendons involved dont heal properly, symptoms may return or get worse. To help prevent this, follow your treatment plan as directed.  When to call your healthcare provider  Call your healthcare provider right away if you have any of these:  · Fever of 100.4°F (38°C) or higher, or as directed  · Redness, swelling, or warmth in the elbow or forearm that gets worse  · Symptoms that dont get better with treatment, or get worse  · New symptoms   Date Last Reviewed: 3/10/2016  © 5422-8380 [employer]. [address] [place] [address]. All rights reserved. This information is not intended as a substitute for professional medical care. Always follow your healthcare professional's instructions.        Tennis Elbow  Muscles connect to bones by thick, fibrous cords (tendons). When the muscles are overused by repeated motion, the tendons may become inflamed and painful. This condition is called tendonitis.  Tennis elbow (lateral epicondylitis) is a form of tendonitis. It occurs when the forearm muscles are used again and again in a twisting motion. Pain from tennis elbow occurs mainly on the outside of the elbow. But the pain can spread into the forearm and wrist. Your  elbow may also be swollen and tender to the touch.  The pain may get worse when you move your arm or do simple activities. Bending your wrist back, shaking hands, or turning a doorknob may cause pain. The pain often gets worse after several weeks or months. Sometimes you may feel pain when your arm is still.  Tennis players who use a backhand stroke with poor technique are more likely to get tennis elbow. But playing tennis is only one cause of tennis elbow. Other common activities that can cause it include:  · Hammering  · Painting  · Raking  Besides tennis players, people at risk include , gardeners, musicians, and dentists. Sometimes people get tennis elbow without doing anything that would cause the injury.  Treatment includes resting the arm and taking anti-inflammatory medicines. Special splints help ease symptoms. Symptoms should get better after 4 to 6 weeks of rest. You may need steroid injections if resting and using a splint dont help. After the pain is relieved, you should change your activities so the symptoms dont return. You may need physical therapy. It may include stretching, range-of-motion, and strengthening exercises. These treatments help most cases. You may need surgery if your symptoms continue for 6 months.  Home care  Follow these guidelines when caring for yourself at home:  · Rest your elbow as needed. Protect it from movement that causes pain. You may be told to use a forearm splint at night to ease symptoms in the morning. Your health care provider may recommend a special wrap or splint to compress the muscles of the forearm. This can ease pain during daytime activities. As your symptoms get better, start to move your elbow more.  · Put an ice pack on the injured area. Do this for 20 minutes every 1 to 2 hours the first day for pain relief. You can make an ice pack by wrapping a plastic bag of ice cubes in a thin towel. Continue using the ice pack 3 to 4 times a day for the  next 2 days. Then use the ice pack as needed to ease pain and swelling.  · You may use acetaminophen or ibuprofen to control pain, unless another pain medicine was prescribed. If you have chronic liver or kidney disease, talk with your health care provider before using these medicines. Also talk with your provider if youve had a stomach ulcer or GI bleeding.  · After your elbow heals, avoid the motion that caused your pain. Or learn to move in a way that causes less stress on the tendon. Using a forearm wrap may keep tennis elbow from happening again.  · A tennis elbow strap may ease pain and keep you from further injury when you start playing tennis again. You can also lower your risk for injury by warming up before you play and cooling down afterward. You should also use the right equipment. For instance, make sure your racquet has the right  and is the right size for you.  Follow-up care  Follow up with your health care provider, or as advised, if your symptoms dont get better after 1 week of treatment.  When to seek medical advice  Call your health care provider right away if any of these occur:  · Redness over the painful area  · Pain or swelling at the elbow gets worse  · Any numbness or tingling in your arm, hands, or fingers  · Unexplained fever over 101ºF (37.8ºC)   Date Last Reviewed: 2/17/2015  © 8671-8352 [employer]. [address] [place], [place] [id number]. All rights reserved. This information is not intended as a substitute for professional medical care. Always follow your healthcare professional's instructions.        Treating Tennis Elbow    Your treatment will depend on how inflamed your tendon is. The goal is to relieve your symptoms and help you regain full use of your elbow.  Rest and medicine  Wearing a tennis elbow splint allows the inflamed tendon to rest. It must be worn properly. It should be placed down the arm past the painful area of the elbow. If it is directly  over the inflamed tendon, it can worsen the symptoms. This brace can help the tendon heal. Using your other hand or changing your  also takes stress off the tendon. Oral nonsteroidal anti-inflammatory medicines (NSAIDs) and/or ice can relieve pain and reduce swelling.  Exercises and therapy  Your healthcare provider may give you an exercise program. He or she may refer you to a therapist. The therapist will teach you to gently stretch and then strengthen the muscles around your elbow.  Anti-inflammatory injections  Your healthcare provider may give you injections of an anti-inflammatory, such as cortisone. This helps reduce swelling. You may have more pain at first. But in a few days, your elbow should feel better.  If surgery is needed  If your symptoms persist for a long time, or other treatments dont work, your healthcare provider may recommend surgery. Surgery repairs the inflamed tendon.   Date Last Reviewed: 9/26/2015  © 2328-1680 [employer]. [address] [place] [address], [place] [address]. All rights reserved. This information is not intended as a substitute for professional medical care. Always follow your healthcare professional's instructions.

## 2018-09-18 NOTE — TELEPHONE ENCOUNTER
Please call the patient and let him know that I have done some research on the conflicting results of the tests that were done to screen for syphilis by Dr. Rivers in April 2017. The RPR screening test was positive when tested but the treponema antibody test that is more specific was negative.  He does not have syphilis.    But, when RPR is falsely positive, as in his case, there are a number of things that can cause a false positive result. These include other infectious diseases, autoimmune diseases, acute bacterial or viral infections, allergic reactions, and recent vaccinations.  Since the original testing was done over a year ago, it is impossible to know now what may have caused the false positive.   At this time, I would recommend retesting with the screening test and the antibody test to be sure we are not missing anything and to determine if we need to test for other causes of the positive RPR.   If he agrees to go back to the lab for additional tests, I have ordered them.   Thanks.  Nasima

## 2018-09-20 ENCOUNTER — TELEPHONE (OUTPATIENT)
Dept: FAMILY MEDICINE | Facility: CLINIC | Age: 53
End: 2018-09-20

## 2018-09-20 DIAGNOSIS — E55.9 VITAMIN D DEFICIENCY: Primary | ICD-10-CM

## 2018-09-20 RX ORDER — ERGOCALCIFEROL 1.25 MG/1
50000 CAPSULE ORAL
Qty: 9 CAPSULE | Refills: 2 | Status: SHIPPED | OUTPATIENT
Start: 2018-09-20 | End: 2018-12-21 | Stop reason: ALTCHOICE

## 2018-09-20 NOTE — TELEPHONE ENCOUNTER
Vitamin D prescription sent to pharmacy.  Order in for repeat vitamin D level, please schedule in 3 months.   Thanks.  Nasima

## 2018-09-20 NOTE — TELEPHONE ENCOUNTER
Patient agrees to begin vitamin D2. Pharmacy verified.       ----- Message from Nasima Cornejo NP sent at 9/18/2018  9:17 PM CDT -----  ## is he willing to start the vitamin D2 prescription supplement at this time?##    Dear Mr. Couch,   I have reviewed the results of your recent blood work.   The prostate specific antigen is normal at 0.83.  This does not indicate an enlarged prostate or prostate cancer.   The blood sugar is normal at 94.  All other electrolytes, kidney, and liver function test results are normal.   The vitamin D level is low at 14 and has decreased from 20 a year ago.  I recommend you start prescription vitamin D2 50,000 IU 2 times per week for 3 months and then recheck the level. Please let me know if you are willing to start the prescription vitamin D, and if so, I will send it to your pharmacy.   The complete blood count shows a slight decrease in the red blood cell count and a slight decrease in the hemoglobin.  This is unchanged from one year ago.  I do not suspect any active blood loss.  We will just keep an eye on this.  The white blood cell and platelet counts are normal.  If you have any questions, please call or email the office.  Thank you.  Nasima

## 2018-10-02 DIAGNOSIS — Z12.11 SCREENING FOR COLON CANCER: Primary | ICD-10-CM

## 2018-10-09 ENCOUNTER — TELEPHONE (OUTPATIENT)
Dept: GASTROENTEROLOGY | Facility: CLINIC | Age: 53
End: 2018-10-09

## 2018-10-09 NOTE — TELEPHONE ENCOUNTER
----- Message from Marie Haque sent at 10/9/2018 10:19 AM CDT -----  Contact: Patient  Patient is calling to cancel his procedure for 10/15 and he will call back when he is ready to reschedule.  Call Back#[phone]  Thanks

## 2018-10-30 ENCOUNTER — TELEPHONE (OUTPATIENT)
Dept: FAMILY MEDICINE | Facility: CLINIC | Age: 53
End: 2018-10-30

## 2018-10-30 DIAGNOSIS — Z12.11 SCREENING FOR COLON CANCER: Primary | ICD-10-CM

## 2018-10-30 NOTE — TELEPHONE ENCOUNTER
Please call patient to schedule colonoscopy. He was seen in office on 10/2/18      ----- Message from Winston Aly sent at 10/30/2018  4:37 PM CDT -----  Type: Needs Medical Advice    Who Called:  patient  Best Call Back Number: [phone]  Additional Information: patient would like to schedule a colonoscopy. Please call to schedule.

## 2018-11-12 ENCOUNTER — HOSPITAL ENCOUNTER (OUTPATIENT)
Facility: HOSPITAL | Age: 53
Discharge: HOME | End: 2018-11-12
Attending: INTERNAL MEDICINE | Admitting: INTERNAL MEDICINE
Payer: MEDICAID

## 2018-11-12 ENCOUNTER — ANESTHESIA (OUTPATIENT)
Dept: ENDOSCOPY | Facility: HOSPITAL | Age: 53
End: 2018-11-12
Payer: MEDICAID

## 2018-11-12 ENCOUNTER — ANESTHESIA EVENT (OUTPATIENT)
Dept: ENDOSCOPY | Facility: HOSPITAL | Age: 53
End: 2018-11-12
Payer: MEDICAID

## 2018-11-12 VITALS
SYSTOLIC BLOOD PRESSURE: 150 MMHG | HEART RATE: 72 BPM | WEIGHT: 140 LBS | OXYGEN SATURATION: 99 % | HEIGHT: 69 IN | TEMPERATURE: 98 F | RESPIRATION RATE: 20 BRPM | DIASTOLIC BLOOD PRESSURE: 92 MMHG | BODY MASS INDEX: 20.73 KG/M2

## 2018-11-12 DIAGNOSIS — K64.8 INTERNAL HEMORRHOIDS: ICD-10-CM

## 2018-11-12 DIAGNOSIS — K63.5 POLYP OF COLON, UNSPECIFIED PART OF COLON, UNSPECIFIED TYPE: Primary | ICD-10-CM

## 2018-11-12 DIAGNOSIS — Z12.11 SCREEN FOR COLON CANCER: ICD-10-CM

## 2018-11-12 PROCEDURE — 00811 ANES LWR INTST NDSC NOS: CPT | Performed by: INTERNAL MEDICINE

## 2018-11-12 PROCEDURE — D9220A PRA ANESTHESIA: Mod: ANES,,, | Performed by: ANESTHESIOLOGY

## 2018-11-12 PROCEDURE — 63600175 PHARM REV CODE 636 W HCPCS: Performed by: NURSE ANESTHETIST, CERTIFIED REGISTERED

## 2018-11-12 PROCEDURE — 37000009 HC ANESTHESIA EA ADD 15 MINS: Performed by: INTERNAL MEDICINE

## 2018-11-12 PROCEDURE — 45380 COLONOSCOPY AND BIOPSY: CPT | Mod: 22,59,, | Performed by: INTERNAL MEDICINE

## 2018-11-12 PROCEDURE — 45385 COLONOSCOPY W/LESION REMOVAL: CPT | Mod: 22,,, | Performed by: INTERNAL MEDICINE

## 2018-11-12 PROCEDURE — 45380 COLONOSCOPY AND BIOPSY: CPT | Performed by: INTERNAL MEDICINE

## 2018-11-12 PROCEDURE — 45385 COLONOSCOPY W/LESION REMOVAL: CPT | Performed by: INTERNAL MEDICINE

## 2018-11-12 PROCEDURE — D9220A PRA ANESTHESIA: Mod: CRNA,,, | Performed by: NURSE ANESTHETIST, CERTIFIED REGISTERED

## 2018-11-12 PROCEDURE — 88305 TISSUE EXAM BY PATHOLOGIST: CPT | Mod: 59 | Performed by: PATHOLOGY

## 2018-11-12 PROCEDURE — 27201089 HC SNARE, DISP (ANY): Performed by: INTERNAL MEDICINE

## 2018-11-12 PROCEDURE — 37000008 HC ANESTHESIA 1ST 15 MINUTES: Performed by: INTERNAL MEDICINE

## 2018-11-12 PROCEDURE — 25000003 PHARM REV CODE 250: Performed by: INTERNAL MEDICINE

## 2018-11-12 PROCEDURE — 27201012 HC FORCEPS, HOT/COLD, DISP: Performed by: INTERNAL MEDICINE

## 2018-11-12 RX ORDER — PROPOFOL 10 MG/ML
INJECTION, EMULSION INTRAVENOUS
Status: DISCONTINUED
Start: 2018-11-12 | End: 2018-11-12 | Stop reason: HOSPADM

## 2018-11-12 RX ORDER — SODIUM CHLORIDE 9 MG/ML
INJECTION, SOLUTION INTRAVENOUS CONTINUOUS
Status: DISCONTINUED | OUTPATIENT
Start: 2018-11-12 | End: 2018-11-12 | Stop reason: HOSPADM

## 2018-11-12 RX ORDER — LIDOCAINE HCL/PF 100 MG/5ML
SYRINGE (ML) INTRAVENOUS
Status: DISCONTINUED | OUTPATIENT
Start: 2018-11-12 | End: 2018-11-12

## 2018-11-12 RX ORDER — PROPOFOL 10 MG/ML
VIAL (ML) INTRAVENOUS
Status: DISCONTINUED | OUTPATIENT
Start: 2018-11-12 | End: 2018-11-12

## 2018-11-12 RX ORDER — PROPOFOL 10 MG/ML
INJECTION, EMULSION INTRAVENOUS
Status: COMPLETED
Start: 2018-11-12 | End: 2018-11-12

## 2018-11-12 RX ADMIN — PROPOFOL 100 MG: 10 INJECTION, EMULSION INTRAVENOUS at 12:11

## 2018-11-12 RX ADMIN — PROPOFOL 40 MG: 10 INJECTION, EMULSION INTRAVENOUS at 12:11

## 2018-11-12 RX ADMIN — PROPOFOL 100 MG: 10 INJECTION, EMULSION INTRAVENOUS at 11:11

## 2018-11-12 RX ADMIN — SODIUM CHLORIDE: 0.9 INJECTION, SOLUTION INTRAVENOUS at 10:11

## 2018-11-12 RX ADMIN — LIDOCAINE HYDROCHLORIDE 50 MG: 20 INJECTION, SOLUTION INTRAVENOUS at 11:11

## 2018-11-12 NOTE — H&P
CC: Screening for colorectal cancer - first occurrence    53 year old male with above. States that symptoms are absent, no alleviating/exacerbating factors. No family history of CA. No personal history of polyps. No bleeding or weight loss.     ROS:  No headache, no fever/chills, no chest pain/SOB, no nausea/vomiting/diarrhea/constipation/GI bleeding/abdominal pain, no dysuria/hematuria.    VSSAF   Exam:   Alert and oriented x 3; no apparent distress   PERRLA, sclera anicteric  CV: Regular rate/rhythm, normal PMI   Lungs: Clear bilaterally with no wheeze/rales   Abdomen: Soft, NT/ND, normal bowel sounds   Ext: No cyanosis, clubbing     Impression:   As above    Plan:   Proceed with endoscopy. Further recs to follow.

## 2018-11-12 NOTE — DISCHARGE INSTRUCTIONS
Hemorrhoids    Hemorrhoids are swollen and inflamed veins inside the rectum and near the anus. The rectum is the last several inches of the colon. The anus is the passage between the rectum and the outside of the body.  Causes  The veins can become swollen due to increased pressure in them. This is most often caused by:  · Chronic constipation or diarrhea  · Straining when having a bowel movement  · Sitting too long on the toilet  · A low-fiber diet  · Pregnancy  Symptoms  · Bleeding from the rectum (this may be noticeable after bowel movements)  · Lump near the anus  · Itching around the anus  · Pain around the anus  There are different types of hemorrhoids. Depending on the type you have and the severity, you may be able to treat yourself at home. In some cases, a procedure may be the best treatment option. Your healthcare provider can tell you more about this, if needed.  Home care  General care  · To get relief from pain or itching, try:  ¨ Topical products. Your healthcare provider may prescribe or recommend creams, ointments, or pads that can be applied to the hemorrhoid. Use these exactly as directed.  ¨ Medicines. Your healthcare provider may recommend stool softeners, suppositories, or laxatives to help manage constipation. Use these exactly as directed.  ¨ Sitz baths. A sitz bath involves sitting in a few inches of warm bath water. Be careful not to make the water so hot that you burn yourself--test it before sitting in it. Soak for about 10 to 15 minutes a few times a day. This may help relieve pain.  Tips to help prevent hemorrhoids  · Eat more fiber. Fiber adds bulk to stool and absorbs water as it moves through your colon. This makes stool softer and easier to pass.  ¨ Increase the fiber in your diet with more fiber-rich foods. These include fresh fruit, vegetables, and whole grains.  ¨ Take a fiber supplement or bulking agent, if advised to by your provider. These include products such as psyllium  or methylcellulose.  · Drink plenty of water, if directed to by your provider. This can help keep stool soft.  · Be more active. Frequent exercise aids digestion and helps prevent constipation. It may also help make bowel movements more regular.  · Dont strain during bowel movements. This can make hemorrhoids more likely. Also, dont sit on the toilet for long periods of time.  Follow-up care  Follow up with your healthcare provider, or as advised. If a culture or imaging tests were done, you will be notified of the results when they are ready. This may take a few days or longer.  When to seek medical advice  Call your healthcare provider right away if any of these occur:  · Increased bleeding from the rectum  · Increased pain around the rectum or anus  · Weakness or dizziness  Call 911  Call 911 or return to the emergency department right away if any of these occur:  · Trouble breathing or swallowing  · Fainting or loss of consciousness  · Unusually fast heart rate  · Vomiting blood  · Large amounts of blood in stool  Date Last Reviewed: 6/22/2015 © 2000-2017 [employer]. [address] [place] [address]. All rights reserved. This information is not intended as a substitute for professional medical care. Always follow your healthcare professional's instructions.        Eating a High-Fiber Diet  Fiber is what gives strength and structure to plants. Most grains, beans, vegetables, and fruits contain fiber. Foods rich in fiber are often low in calories and fat, and they fill you up more. They may also reduce your risks for certain health problems. To find out the amount of fiber in canned, packaged, or frozen foods, read the Nutrition Facts label. It tells you how much fiber is in a serving.    Types of fiber and their benefits  There are two types of fiber: insoluble and soluble. They both aid digestion and help you maintain a healthy weight.  · Insoluble fiber. This is found in whole grains,  cereals, certain fruits and vegetables such as apple skin, corn, and carrots. Insoluble fiber may prevent constipation and reduce the risk for certain types of cancer.  · Soluble fiber. This type of fiber is in oats, beans, and certain fruits and vegetables such as strawberries and peas. Soluble fiber can reduce cholesterol, which may help lower the risk for heart disease. It also helps control blood sugar levels.  Look for high-fiber foods  Try these foods to add fiber to your diet:  · Whole-grain breads and cereals. Try to eat 6 to 8 ounces a day. Include wheat and oat bran cereals, whole-wheat muffins or toast, and corn tortillas in your meals.  · Fruits. Try to eat 2 cups a day. Apples, oranges, strawberries, pears, and bananas are good sources. (Note: Fruit juice is low in fiber.)  · Vegetables. Try to eat at least 2.5 cups a day. Add asparagus, carrots, broccoli, peas, and corn to your meals.  · Beans. One cup of cooked lentils gives you over 15 grams of fiber. Try navy beans, lentils, and chickpeas.  · Seeds. A small handful of seeds gives you about 3 grams of fiber. Try sunflower seeds.  Keep track of your fiber  Keep track of how much fiber you eat. Start by reading food labels. Then eat a variety of foods high in fiber. As you begin to eat more fiber, ask your healthcare provider how much water you should be drinking to keep your digestive system working smoothly.  You should aim for a certain amount of fiber in your diet each day. If you are a woman, that amount is between 25 and 28 grams per day. Men should aim for 30 to 33 grams per day. After age 50, your daily fiber needs drop to 22 grams for women and 28 grams for men.  Before you reach for the fiber supplements, think about this. Fiber is found naturally in healthy whole foods. It gives you that feeling of fullness after you eat. Taking fiber supplements or eating fiber-enriched foods will not give you this full feeling.  Your fiber intake is a good  measure for the quality of your overall diet. If you are missing out on your daily amount of fiber, you may be lacking other important nutrients as well.  Date Last Reviewed: 5/11/2015  © 9929-5106 [employer]. [address] [place] [address], [place] [address]. All rights reserved. This information is not intended as a substitute for professional medical care. Always follow your healthcare professional's instructions.        Colonoscopy     A camera attached to a flexible tube with a viewing lens is used to take video pictures.     Colonoscopy is a test to view the inside of your lower digestive tract (colon and rectum). Sometimes it can show the last part of the small intestine (ileum). During the test, small pieces of tissue may be removed for testing. This is called a biopsy. Small growths, such as polyps, may also be removed.   Why is colonoscopy done?  The test is done to help look for colon cancer. And it can help find the source of abdominal pain, bleeding, and changes in bowel habits. It may be needed once a year, depending on factors such as your:  · Age  · Health history  · Family health history  · Symptoms  · Results from any prior colonoscopy  Risks and possible complications  These include:  · Bleeding               · A puncture or tear in the colon   · Risks of anesthesia  · A cancer lesion not being seen  Getting ready   To prepare for the test:  · Talk with your healthcare provider about the risks of the test (see below). Also ask your healthcare provider about alternatives to the test.  · Tell your healthcare provider about any medicines you take. Also tell him or her about any health conditions you may have.  · Make sure your rectum and colon are empty for the test. Follow the diet and bowel prep instructions exactly. If you dont, the test may need to be rescheduled.  · Plan for a friend or family member to drive you home after the test.     Colonoscopy provides an inside view of the entire  colon.     You may discuss the results with your doctor right away or at a future visit.  During the test   The test is usually done in the hospital on an outpatient basis. This means you go home the same day. The procedure takes about 30 minutes. During that time:  · You are given relaxing (sedating) medicine through an IV line. You may be drowsy, or fully asleep.  · The healthcare provider will first give you a physical exam to check for anal and rectal problems.  · Then the anus is lubricated and the scope inserted.  · If you are awake, you may have a feeling similar to needing to have a bowel movement. You may also feel pressure as air is pumped into the colon. Its OK to pass gas during the procedure.  · Biopsy, polyp removal, or other treatments may be done during the test.  After the test   You may have gas right after the test. It can help to try to pass it to help prevent later bloating. Your healthcare provider may discuss the results with you right away. Or you may need to schedule a follow-up visit to talk about the results. After the test, you can go back to your normal eating and other activities. You may be tired from the sedation and need to rest for a few hours.  Date Last Reviewed: 11/1/2016  © 4452-5847 [employer]. [address] [place] [address], [place] [address]. All rights reserved. This information is not intended as a substitute for professional medical care. Always follow your healthcare professional's instructions.        Understanding Colon and Rectal Polyps    The colon (also called the large intestine) is a muscular tube that forms the last part of the digestive tract. It absorbs water and stores food waste. The colon is about 4 to 6 feet long. The rectum is the last 6 inches of the colon. The colon and rectum have a smooth lining composed of millions of cells. Changes in these cells can lead to growths in the colon that can become cancerous and should be removed. Multiple tests  are available to screen for colon cancer, but the colonoscopy is the most recommended test. During colonoscopy, these polyps can be removed. How often you need this test depends on many things including your condition, your family history, symptoms, and what the findings were at the previous colonoscopy.   When the colon lining changes  Changes that happen in the cells that line the colon or rectum can lead to growths called polyps. Over a period of years, polyps can turn cancerous. Removing polyps early may prevent cancer from ever forming.  Polyps  Polyps are fleshy clumps of tissue that form on the lining of the colon or rectum. Small polyps are usually benign (not cancerous). However, over time, cells in a polyp can change and become cancerous. Certain types of polyps known as adenomatous polyps are premalignant. The risk for invasive cancer increases with the size of the polyp and certain cell and gene features. This means that they can become cancerous if they're not removed. Hyperplastic polyps are benign. They can grow quite large and not turn cancerous.   Cancer  Almost all colorectal cancers start when polyp cells begin growing abnormally. As a cancerous tumor grows, it may involve more and more of the colon or rectum. In time, cancer can also grow beyond the colon or rectum and spread to nearby organs or to glands called lymph nodes. The cells can also travel to other parts of the body. This is known as metastasis. The earlier a cancerous tumor is removed, the better the chance of preventing its spread.    Date Last Reviewed: 8/1/2016  © 8380-9295 [employer]. [address] [place] [address], [place] [address]. All rights reserved. This information is not intended as a substitute for professional medical care. Always follow your healthcare professional's instructions.      Discharge Instructions: After Your Surgery/Procedure  Youve just had surgery. During surgery you were given medicine called  "anesthesia to keep you relaxed and free of pain. After surgery you may have some pain or nausea. This is common. Here are some tips for feeling better and getting well after surgery.     Stay on schedule with your medication.   Going home  Your doctor or nurse will show you how to take care of yourself when you go home. He or she will also answer your questions. Have an adult family member or friend drive you home.      For your safety we recommend these precaution for the first 24 hours after your procedure:  · Do not drive or use heavy equipment.  · Do not make important decisions or sign legal papers.  · Do not drink alcohol.  · Have someone stay with you, if needed. He or she can watch for problems and help keep you safe.  · Your concentration, balance, coordination, and judgement may be impaired for many hours after anesthesia.  Use caution when ambulating or standing up.     · You may feel weak and "washed out" after anesthesia and surgery.      Subtle residual effects of general anesthesia or sedation with regional / local anesthesia can last more than 24 hours.  Rest for the remainder of the day or longer if your Doctor/Surgeon has advised you to do so.  Although you may feel normal within the first 24 hours, your reflexes and mental ability may be impaired without you realizing it.  You may feel dizzy, lightheaded or sleepy for 24 hours or longer.      Be sure to go to all follow-up visits with your doctor. And rest after your surgery for as long as your doctor tells you to.  Coping with pain  If you have pain after surgery, pain medicine will help you feel better. Take it as told, before pain becomes severe. Also, ask your doctor or pharmacist about other ways to control pain. This might be with heat, ice, or relaxation. And follow any other instructions your surgeon or nurse gives you.  Tips for taking pain medicine  To get the best relief possible, remember these points:  · Pain medicines can upset your " stomach. Taking them with a little food may help.  · Most pain relievers taken by mouth need at least 20 to 30 minutes to start to work.  · Taking medicine on a schedule can help you remember to take it. Try to time your medicine so that you can take it before starting an activity. This might be before you get dressed, go for a walk, or sit down for dinner.  · Constipation is a common side effect of pain medicines. Call your doctor before taking any medicines such as laxatives or stool softeners to help ease constipation. Also ask if you should skip any foods. Drinking lots of fluids and eating foods such as fruits and vegetables that are high in fiber can also help. Remember, do not take laxatives unless your surgeon has prescribed them.  · Drinking alcohol and taking pain medicine can cause dizziness and slow your breathing. It can even be deadly. Do not drink alcohol while taking pain medicine.  · Pain medicine can make you react more slowly to things. Do not drive or run machinery while taking pain medicine.  Your health care provider may tell you to take acetaminophen to help ease your pain. Ask him or her how much you are supposed to take each day. Acetaminophen or other pain relievers may interact with your prescription medicines or other over-the-counter (OTC) drugs. Some prescription medicines have acetaminophen and other ingredients. Using both prescription and OTC acetaminophen for pain can cause you to overdose. Read the labels on your OTC medicines with care. This will help you to clearly know the list of ingredients, how much to take, and any warnings. It may also help you not take too much acetaminophen. If you have questions or do not understand the information, ask your pharmacist or health care provider to explain it to you before you take the OTC medicine.  Managing nausea  Some people have an upset stomach after surgery. This is often because of anesthesia, pain, or pain medicine, or the stress  of surgery. These tips will help you handle nausea and eat healthy foods as you get better. If you were on a special food plan before surgery, ask your doctor if you should follow it while you get better. These tips may help:  · Do not push yourself to eat. Your body will tell you when to eat and how much.  · Start off with clear liquids and soup. They are easier to digest.  · Next try semi-solid foods, such as mashed potatoes, applesauce, and gelatin, as you feel ready.  · Slowly move to solid foods. Dont eat fatty, rich, or spicy foods at first.  · Do not force yourself to have 3 large meals a day. Instead eat smaller amounts more often.  · Take pain medicines with a small amount of solid food, such as crackers or toast, to avoid nausea.     Call your surgeon if  · You still have pain an hour after taking medicine. The medicine may not be strong enough.  · You feel too sleepy, dizzy, or groggy. The medicine may be too strong.  · You have side effects like nausea, vomiting, or skin changes, such as rash, itching, or hives.       If you have obstructive sleep apnea  You were given anesthesia medicine during surgery to keep you comfortable and free of pain. After surgery, you may have more apnea spells because of this medicine and other medicines you were given. The spells may last longer than usual.   At home:  · Keep using the continuous positive airway pressure (CPAP) device when you sleep. Unless your health care provider tells you not to, use it when you sleep, day or night. CPAP is a common device used to treat obstructive sleep apnea.  · Talk with your provider before taking any pain medicine, muscle relaxants, or sedatives. Your provider will tell you about the possible dangers of taking these medicines.  © 2528-9152 [employer]. [address] [place] [address], [place] [address]. All rights reserved. This information is not intended as a substitute for professional medical care. Always follow your  healthcare professional's instructions.

## 2018-11-12 NOTE — TRANSFER OF CARE
"Anesthesia Transfer of Care Note    Patient: Cornelius Couch    Procedure(s) Performed: Procedure(s) (LRB):  COLONOSCOPY (N/A)    Patient location: GI    Transport from OR: Transported from OR on 2-3 L/min O2 by NC with adequate spontaneous ventilation    Post pain: adequate analgesia    Post vital signs: stable    Level of consciousness: awake and alert    Nausea/Vomiting: no nausea/vomiting    Complications: none    Transfer of care protocol was followed      Last vitals:   Visit Vitals  /78 (BP Location: Left arm, Patient Position: Lying)   Pulse 82   Temp 37.3 °C (99.1 °F) (Temporal)   Resp 18   Ht 5' 9" (1.753 m)   Wt 63.5 kg (140 lb)   SpO2 98%   BMI 20.67 kg/m²     "

## 2018-11-12 NOTE — PLAN OF CARE
Patient awake, alert, and oriented.  No complaints of pain or discomfort at present time.  Patient tolerated po fluids well;  Passing flatus without difficulty.  Dr. Leon spoke with patient prior to discharge.  Abdomen soft and nontender;  Ambulates without difficulty;  All instructions given and reviewed with patient and family;  Stable for discharge to home accompanied by friend.

## 2018-11-12 NOTE — ANESTHESIA PREPROCEDURE EVALUATION
11/12/2018  Cornelius Couch is a 53 y.o., male.    Anesthesia Evaluation    I have reviewed the Patient Summary Reports.    I have reviewed the Nursing Notes.   I have reviewed the Medications.     Review of Systems  Anesthesia Hx:  Denies Family Hx of Anesthesia complications.   Denies Personal Hx of Anesthesia complications.   Social:  Former Smoker    Hepatic/GI:   Bowel Prep.        Physical Exam  General:  Well nourished    Airway/Jaw/Neck:  Airway Findings: Mouth Opening: Normal Tongue: Normal  General Airway Assessment: Adult  Mallampati: I  TM Distance: Normal, at least 6 cm       Chest/Lungs:  Chest/Lungs Clear    Heart/Vascular:  Heart Findings: Normal            Anesthesia Plan  Type of Anesthesia, risks & benefits discussed:  Anesthesia Type:  general  Patient's Preference:   Intra-op Monitoring Plan: standard ASA monitors  Intra-op Monitoring Plan Comments:   Post Op Pain Control Plan:   Post Op Pain Control Plan Comments:   Induction:   IV  Beta Blocker:  Patient is not currently on a Beta-Blocker (No further documentation required).       Informed Consent: Patient understands risks and agrees with Anesthesia plan.  Questions answered. Anesthesia consent signed with patient.  ASA Score: 1     Day of Surgery Review of History & Physical:    H&P update referred to the provider.         Ready For Surgery From Anesthesia Perspective.

## 2018-11-12 NOTE — OR NURSING
Have you had a colonoscopy LESS THAN 3 years ago?   * If YES, answer these questions*:     NO  1. Did patient have a prior colonic polyp in a previous surveillance/diagnostic colonoscopy and is 18 years or older on date of encounter?     NO    2. Documentation of < 3 year interval since the patients last colonoscopy due to medical reasons (eg., last colonoscopy incomplete, last colonoscopy had inadequate prep, piecemeal removal of adenomas, or last colonoscopy found > 10 adenomas) ?     1st colonoscopy

## 2018-11-12 NOTE — ANESTHESIA POSTPROCEDURE EVALUATION
"Anesthesia Post Evaluation    Patient: Cornelius Couch    Procedure(s) Performed: Procedure(s) (LRB):  COLONOSCOPY (N/A)    Final Anesthesia Type: general  Patient location during evaluation: PACU  Patient participation: Yes- Able to Participate  Level of consciousness: awake and alert  Post-procedure vital signs: reviewed and stable  Pain management: adequate  Airway patency: patent  PONV status at discharge: No PONV  Anesthetic complications: no      Cardiovascular status: blood pressure returned to baseline  Respiratory status: unassisted  Hydration status: euvolemic  Follow-up not needed.        Visit Vitals  BP (!) 150/92   Pulse 72   Temp 36.5 °C (97.7 °F) (Temporal)   Resp 20   Ht 5' 9" (1.753 m)   Wt 63.5 kg (140 lb)   SpO2 99%   BMI 20.67 kg/m²       Pain/Donnie Score: Pain Assessment Performed: Yes (11/12/2018 10:47 AM)  Presence of Pain: denies (11/12/2018  1:48 PM)  Donnie Score: 10 (11/12/2018  1:48 PM)        "

## 2018-11-12 NOTE — [employer] PATIENT INSTRUCTIONS
Discharge Summary/Instructions after an Endoscopic Procedure  Patient Name: Cornelius Couch  Patient MRN: [id number]  Patient YOB: 1965  Monday, November 12, 2018  Car Leon MD  RESTRICTIONS:  During your procedure today, you received medications for sedation.  These   medications may affect your judgment, balance and coordination.  Therefore,   for 24 hours, you have the following restrictions:   - DO NOT drive a car, operate machinery, make legal/financial decisions,   sign important papers or drink alcohol.    ACTIVITY:  Today: no heavy lifting, straining or running due to procedural   sedation/anesthesia.  The following day: return to full activity including work.  DIET:  Eat and drink normally unless instructed otherwise.     TREATMENT FOR COMMON SIDE EFFECTS:  - Mild abdominal pain, nausea, belching, bloating or excessive gas:  rest,   eat lightly and use a heating pad.  - Sore Throat: treat with throat lozenges and/or gargle with warm salt   water.  - Because air was used during the procedure, expelling large amounts of air   from your rectum or belching is normal.  - If a bowel prep was taken, you may not have a bowel movement for 1-3 days.    This is normal.  SYMPTOMS TO WATCH FOR AND REPORT TO YOUR PHYSICIAN:  1. Abdominal pain or bloating, other than gas cramps.  2. Chest pain.  3. Back pain.  4. Signs of infection such as: chills or fever occurring within 24 hours   after the procedure.  5. Rectal bleeding, which would show as bright red, maroon, or black stools.   (A tablespoon of blood from the rectum is not serious, especially if   hemorrhoids are present.)  6. Vomiting.  7. Weakness or dizziness.  GO DIRECTLY TO THE NEAREST EMERGENCY ROOM IF YOU HAVE ANY OF THE FOLLOWING:      Difficulty breathing              Chills and/or fever over 101 F   Persistent vomiting and/or vomiting blood   Severe abdominal pain   Severe chest pain   Black, tarry stools   Bleeding- more than one  tablespoon   Any other symptom or condition that you feel may need urgent attention  Your doctor recommends these additional instructions:  If any biopsies were taken, your doctors clinic will contact you in 1 to 2   weeks with any results.  - Patient has a contact number available for emergencies.  The signs and   symptoms of potential delayed complications were discussed with the   patient.  Return to normal activities tomorrow.  Written discharge   instructions were provided to the patient.   - High fiber diet.   - Continue present medications.   - Await pathology results.   - Repeat colonoscopy in 1 year for surveillance.   - Discharge patient to home (ambulatory).   - Return to my office PRN.  For questions, problems or results please call your physician - Car Leon MD at Work:  [phone].  [facility], EMERGENCY ROOM PHONE NUMBER: [phone]  IF A COMPLICATION OR EMERGENCY SITUATION ARISES AND YOU ARE UNABLE TO REACH   YOUR PHYSICIAN - GO DIRECTLY TO THE EMERGENCY ROOM.  Car Leon MD  11/12/2018 12:53:56 PM  This report has been verified and signed electronically.  [employer]

## 2018-12-20 ENCOUNTER — LAB VISIT (OUTPATIENT)
Dept: LAB | Facility: HOSPITAL | Age: 53
End: 2018-12-20
Attending: NURSE PRACTITIONER
Payer: MEDICAID

## 2018-12-20 DIAGNOSIS — E55.9 VITAMIN D DEFICIENCY: ICD-10-CM

## 2018-12-20 LAB — 25(OH)D3+25(OH)D2 SERPL-MCNC: 49 NG/ML

## 2018-12-20 PROCEDURE — 36415 COLL VENOUS BLD VENIPUNCTURE: CPT | Mod: PO

## 2018-12-20 PROCEDURE — 82306 VITAMIN D 25 HYDROXY: CPT

## 2018-12-21 DIAGNOSIS — E55.9 VITAMIN D DEFICIENCY: Primary | ICD-10-CM

## 2018-12-21 RX ORDER — VIT C/E/ZN/COPPR/LUTEIN/ZEAXAN 250MG-90MG
1000 CAPSULE ORAL DAILY
Qty: 30 CAPSULE | Refills: 99 | COMMUNITY
Start: 2018-12-21 | End: 2019-02-14

## 2019-02-14 ENCOUNTER — OFFICE VISIT (OUTPATIENT)
Dept: FAMILY MEDICINE | Facility: CLINIC | Age: 54
End: 2019-02-14
Payer: MEDICAID

## 2019-02-14 VITALS
HEART RATE: 77 BPM | WEIGHT: 139.56 LBS | BODY MASS INDEX: 20.67 KG/M2 | HEIGHT: 69 IN | DIASTOLIC BLOOD PRESSURE: 86 MMHG | SYSTOLIC BLOOD PRESSURE: 138 MMHG | TEMPERATURE: 99 F | OXYGEN SATURATION: 99 %

## 2019-02-14 DIAGNOSIS — F43.21 COMPLICATED GRIEVING: ICD-10-CM

## 2019-02-14 DIAGNOSIS — R03.0 ELEVATED BP WITHOUT DIAGNOSIS OF HYPERTENSION: ICD-10-CM

## 2019-02-14 DIAGNOSIS — M54.12 CERVICAL RADICULOPATHY: Primary | ICD-10-CM

## 2019-02-14 PROCEDURE — 99999 PR PBB SHADOW E&M-EST. PATIENT-LVL III: CPT | Mod: PBBFAC,,, | Performed by: NURSE PRACTITIONER

## 2019-02-14 PROCEDURE — 99999 PR PBB SHADOW E&M-EST. PATIENT-LVL III: ICD-10-PCS | Mod: PBBFAC,,, | Performed by: NURSE PRACTITIONER

## 2019-02-14 PROCEDURE — 99213 OFFICE O/P EST LOW 20 MIN: CPT | Mod: S$PBB,,, | Performed by: NURSE PRACTITIONER

## 2019-02-14 PROCEDURE — 99213 OFFICE O/P EST LOW 20 MIN: CPT | Mod: PBBFAC,PO | Performed by: NURSE PRACTITIONER

## 2019-02-14 PROCEDURE — 99213 PR OFFICE/OUTPT VISIT, EST, LEVL III, 20-29 MIN: ICD-10-PCS | Mod: S$PBB,,, | Performed by: NURSE PRACTITIONER

## 2019-02-14 RX ORDER — METHYLPREDNISOLONE 4 MG/1
TABLET ORAL
Qty: 1 PACKAGE | Refills: 0 | Status: SHIPPED | OUTPATIENT
Start: 2019-02-14 | End: 2019-02-21 | Stop reason: ALTCHOICE

## 2019-02-14 RX ORDER — TIZANIDINE 4 MG/1
4 TABLET ORAL EVERY 6 HOURS PRN
Qty: 28 TABLET | Refills: 0 | Status: SHIPPED | OUTPATIENT
Start: 2019-02-14 | End: 2019-02-21

## 2019-02-14 NOTE — PROGRESS NOTES
Subjective:       Patient ID: Cornelius Couch is a 53 y.o. male.    Chief Complaint: Arm Pain    Mr. Couch presents today with complains of neck and right upper back pain. This has been present intermittently for a few months. Has numbness to his right thumb and forefinger with tingling down the right arm. Does not limit ROM. When this first started, he took naproxen- provided mild relief. He is not currently taking any medications. Wife recently passed in October and he is wondering if this could be caused by stress. BP elevated, better with recheck.       Patient Active Problem List   Diagnosis    Benign prostatic hyperplasia with urinary frequency    Anemia    Vitamin D deficiency    BMI 20.0-20.9, adult    Right lateral epicondylitis    Screen for colon cancer       Review of Systems   Constitutional: Negative for chills and fatigue.   Respiratory: Negative for shortness of breath and wheezing.    Cardiovascular: Negative for chest pain and palpitations.   Musculoskeletal: Positive for arthralgias, back pain and myalgias.   Neurological: Positive for numbness (right thumb and forefinger).        Paraesthesias right arm    Psychiatric/Behavioral: Positive for dysphoric mood. The patient is not nervous/anxious.        Objective:      Physical Exam   Constitutional: He is oriented to person, place, and time. He appears well-developed and well-nourished.   HENT:   Head: Normocephalic and atraumatic.   Cardiovascular: Normal rate, regular rhythm and normal heart sounds.   No murmur heard.  Pulmonary/Chest: Effort normal and breath sounds normal. He has no wheezes.   Musculoskeletal: He exhibits no edema.        Cervical back: He exhibits tenderness and pain. He exhibits normal range of motion.        Right upper arm: He exhibits no tenderness, no swelling and no deformity.   Neurological: He is alert and oriented to person, place, and time.   Skin: Skin is dry.       Assessment:       1. Cervical  radiculopathy    2. Complicated grieving    3. Elevated BP without diagnosis of hypertension        Plan:     Cornelius was seen today for arm pain.    Diagnoses and all orders for this visit:    Cervical radiculopathy  -     methylPREDNISolone (MEDROL DOSEPACK) 4 mg tablet; use as directed  -     tiZANidine (ZANAFLEX) 4 MG tablet; Take 1 tablet (4 mg total) by mouth every 6 (six) hours as needed.  May use ice and heat  If no improvement, consider physical medicine referral   F/U 1 week    Complicated grieving  Denies need for support groups or counseling  Does not want to take daily medication  Good support in family, children and friends     Elevated BP without diagnosis of hypertension  I counseled the patient on HTN education, management and recommendations.  I recommended weight loss toward a BMI < 25, avoidance of salt and the DASH diet, regular cardio exercise a minimum of 150 minutes per week and medications if indicated. The goal is < 140/90 unless otherwise specified.        F/U 1 week

## 2019-02-21 ENCOUNTER — OFFICE VISIT (OUTPATIENT)
Dept: FAMILY MEDICINE | Facility: CLINIC | Age: 54
End: 2019-02-21
Payer: MEDICAID

## 2019-02-21 ENCOUNTER — HOSPITAL ENCOUNTER (OUTPATIENT)
Dept: RADIOLOGY | Facility: CLINIC | Age: 54
Discharge: HOME | End: 2019-02-21
Attending: PHYSICIAN ASSISTANT
Payer: MEDICAID

## 2019-02-21 VITALS
SYSTOLIC BLOOD PRESSURE: 128 MMHG | DIASTOLIC BLOOD PRESSURE: 78 MMHG | WEIGHT: 137.38 LBS | HEIGHT: 69 IN | OXYGEN SATURATION: 99 % | TEMPERATURE: 98 F | RESPIRATION RATE: 17 BRPM | BODY MASS INDEX: 20.35 KG/M2 | HEART RATE: 73 BPM

## 2019-02-21 DIAGNOSIS — M54.12 CERVICAL RADICULOPATHY: ICD-10-CM

## 2019-02-21 DIAGNOSIS — M54.12 CERVICAL RADICULOPATHY: Primary | ICD-10-CM

## 2019-02-21 DIAGNOSIS — M25.511 RIGHT SHOULDER PAIN, UNSPECIFIED CHRONICITY: ICD-10-CM

## 2019-02-21 DIAGNOSIS — Z63.4 BEREAVEMENT: ICD-10-CM

## 2019-02-21 PROCEDURE — 72050 X-RAY EXAM NECK SPINE 4/5VWS: CPT | Mod: 26,,, | Performed by: RADIOLOGY

## 2019-02-21 PROCEDURE — 73030 X-RAY EXAM OF SHOULDER: CPT | Mod: 26,RT,S$GLB, | Performed by: RADIOLOGY

## 2019-02-21 PROCEDURE — 99999 PR PBB SHADOW E&M-EST. PATIENT-LVL IV: ICD-10-PCS | Mod: PBBFAC,,, | Performed by: PHYSICIAN ASSISTANT

## 2019-02-21 PROCEDURE — 99213 OFFICE O/P EST LOW 20 MIN: CPT | Mod: S$PBB,,, | Performed by: PHYSICIAN ASSISTANT

## 2019-02-21 PROCEDURE — 73030 X-RAY EXAM OF SHOULDER: CPT | Mod: TC,FY,PO,RT

## 2019-02-21 PROCEDURE — 72050 XR CERVICAL SPINE COMPLETE 5 VIEW: ICD-10-PCS | Mod: 26,,, | Performed by: RADIOLOGY

## 2019-02-21 PROCEDURE — 99999 PR PBB SHADOW E&M-EST. PATIENT-LVL IV: CPT | Mod: PBBFAC,,, | Performed by: PHYSICIAN ASSISTANT

## 2019-02-21 PROCEDURE — 99213 PR OFFICE/OUTPT VISIT, EST, LEVL III, 20-29 MIN: ICD-10-PCS | Mod: S$PBB,,, | Performed by: PHYSICIAN ASSISTANT

## 2019-02-21 PROCEDURE — 73030 XR SHOULDER COMPLETE 2 OR MORE VIEWS RIGHT: ICD-10-PCS | Mod: 26,RT,S$GLB, | Performed by: RADIOLOGY

## 2019-02-21 PROCEDURE — 99214 OFFICE O/P EST MOD 30 MIN: CPT | Mod: PBBFAC,PO,25 | Performed by: PHYSICIAN ASSISTANT

## 2019-02-21 PROCEDURE — 72050 X-RAY EXAM NECK SPINE 4/5VWS: CPT | Mod: TC,FY,PO

## 2019-02-21 SDOH — SOCIAL DETERMINANTS OF HEALTH (SDOH): DISSAPEARANCE AND DEATH OF FAMILY MEMBER: Z63.4

## 2019-02-21 NOTE — PROGRESS NOTES
Subjective:       Patient ID: Cornelius Couch is a 53 y.o. male.    Chief Complaint: Follow-up (right shoulder pain)    Mr. Couch comes to clinic today for follow up of right shoulder pain and cervical radiculopathy. The patient has completed the medrol dose pack and is taking zanaflex nightly. The patient reports his pain has improved to a 4/10 from a 8/10. The patient reports the pain is worse when reaching over head and when moving his neck. The patient denies any recent trauma or injury to the shoulder or neck. He does report that he had an injury 5-6 years ago for which he attended PT. The patient does continue to grieve the loss of his wife. The patient does not want a referral to therapy or counseling at this time. The patient reports that he has resources available to him if he needs them.       Review of Systems   Constitutional: Negative for activity change, appetite change and fever.   HENT: Negative for postnasal drip, rhinorrhea and sinus pressure.    Eyes: Negative for visual disturbance.   Respiratory: Negative for cough and shortness of breath.    Cardiovascular: Negative for chest pain.   Gastrointestinal: Negative for abdominal distention and abdominal pain.   Genitourinary: Negative for difficulty urinating and dysuria.   Musculoskeletal: Positive for arthralgias and myalgias.   Neurological: Negative for headaches.   Hematological: Negative for adenopathy.   Psychiatric/Behavioral: Positive for dysphoric mood. The patient is not nervous/anxious.        Objective:      Physical Exam   Constitutional: He is oriented to person, place, and time.   Neck:   Discomfort in cervical spine with flexion and extension   Discomfort in right shoulder with external and internal rotation.   Left shoulder WNL   Cardiovascular: Normal rate and regular rhythm.   Pulmonary/Chest: Effort normal and breath sounds normal. He has no wheezes.   Abdominal: Soft. Bowel sounds are normal. There is no tenderness.    Musculoskeletal: He exhibits no edema.   Lymphadenopathy:     He has no cervical adenopathy.   Neurological: He is alert and oriented to person, place, and time.   Skin: No erythema.   Psychiatric: His behavior is normal.   Patient tearful when discussing his wife       Assessment:       1. Cervical radiculopathy    2. Right shoulder pain, unspecified chronicity    3. Bereavement        Plan:   Cornelius was seen today for follow-up.    Diagnoses and all orders for this visit:    Cervical radiculopathy  -     X-Ray Cervical Spine Complete 5 view; Future    Right shoulder pain, unspecified chronicity  -     X-ray Shoulder 2 or More Views Right; Future    Bereavement  Patient encouraged to contact clinic if patient feels he needs resources for bereavement counseling/ therapy    Consider ortho or physical medicine referral.

## 2019-04-19 ENCOUNTER — HOSPITAL ENCOUNTER (EMERGENCY)
Facility: HOSPITAL | Age: 54
Discharge: HOME | End: 2019-04-19
Attending: EMERGENCY MEDICINE
Payer: MEDICAID

## 2019-04-19 VITALS
SYSTOLIC BLOOD PRESSURE: 161 MMHG | RESPIRATION RATE: 18 BRPM | HEART RATE: 95 BPM | BODY MASS INDEX: 20.73 KG/M2 | DIASTOLIC BLOOD PRESSURE: 89 MMHG | OXYGEN SATURATION: 99 % | HEIGHT: 69 IN | WEIGHT: 140 LBS | TEMPERATURE: 98 F

## 2019-04-19 DIAGNOSIS — S68.119A TRAUMATIC AMPUTATION OF FINGERTIP, INITIAL ENCOUNTER: Primary | ICD-10-CM

## 2019-04-19 PROCEDURE — 25000003 PHARM REV CODE 250

## 2019-04-19 PROCEDURE — 63600175 PHARM REV CODE 636 W HCPCS

## 2019-04-19 PROCEDURE — 25000003 PHARM REV CODE 250: Performed by: EMERGENCY MEDICINE

## 2019-04-19 PROCEDURE — 96375 TX/PRO/DX INJ NEW DRUG ADDON: CPT

## 2019-04-19 PROCEDURE — 63600175 PHARM REV CODE 636 W HCPCS: Performed by: EMERGENCY MEDICINE

## 2019-04-19 PROCEDURE — 99284 EMERGENCY DEPT VISIT MOD MDM: CPT | Mod: 25

## 2019-04-19 PROCEDURE — 96374 THER/PROPH/DIAG INJ IV PUSH: CPT | Mod: 59

## 2019-04-19 PROCEDURE — 12001 RPR S/N/AX/GEN/TRNK 2.5CM/<: CPT

## 2019-04-19 RX ORDER — MORPHINE SULFATE 4 MG/ML
4 INJECTION, SOLUTION INTRAMUSCULAR; INTRAVENOUS
Status: DISCONTINUED | OUTPATIENT
Start: 2019-04-19 | End: 2019-04-19 | Stop reason: HOSPADM

## 2019-04-19 RX ORDER — ONDANSETRON 2 MG/ML
8 INJECTION INTRAMUSCULAR; INTRAVENOUS
Status: COMPLETED | OUTPATIENT
Start: 2019-04-19 | End: 2019-04-19

## 2019-04-19 RX ORDER — LIDOCAINE HYDROCHLORIDE 10 MG/ML
INJECTION INFILTRATION; PERINEURAL
Status: COMPLETED
Start: 2019-04-19 | End: 2019-04-19

## 2019-04-19 RX ORDER — ONDANSETRON 4 MG/1
4 TABLET, FILM COATED ORAL EVERY 6 HOURS
Qty: 12 TABLET | Refills: 0 | Status: SHIPPED | OUTPATIENT
Start: 2019-04-19 | End: 2019-04-22 | Stop reason: SDUPTHER

## 2019-04-19 RX ORDER — CEFAZOLIN SODIUM 1 G/3ML
2 INJECTION, POWDER, FOR SOLUTION INTRAMUSCULAR; INTRAVENOUS
Status: COMPLETED | OUTPATIENT
Start: 2019-04-19 | End: 2019-04-19

## 2019-04-19 RX ORDER — CEFAZOLIN SODIUM 1 G/3ML
2 INJECTION, POWDER, FOR SOLUTION INTRAMUSCULAR; INTRAVENOUS
Status: DISCONTINUED | OUTPATIENT
Start: 2019-04-19 | End: 2019-04-19

## 2019-04-19 RX ORDER — MORPHINE SULFATE 8 MG/ML
INJECTION INTRAMUSCULAR; INTRAVENOUS; SUBCUTANEOUS
Status: COMPLETED
Start: 2019-04-19 | End: 2019-04-19

## 2019-04-19 RX ORDER — OXYCODONE HYDROCHLORIDE 5 MG/1
5 TABLET ORAL EVERY 4 HOURS PRN
Qty: 10 TABLET | Refills: 0 | Status: SHIPPED | OUTPATIENT
Start: 2019-04-19 | End: 2019-04-22 | Stop reason: ALTCHOICE

## 2019-04-19 RX ORDER — CLINDAMYCIN HYDROCHLORIDE 300 MG/1
300 CAPSULE ORAL EVERY 8 HOURS
Qty: 42 CAPSULE | Refills: 0 | Status: SHIPPED | OUTPATIENT
Start: 2019-04-19 | End: 2019-05-03

## 2019-04-19 RX ORDER — OXYCODONE HYDROCHLORIDE 5 MG/1
5 TABLET ORAL
Status: COMPLETED | OUTPATIENT
Start: 2019-04-19 | End: 2019-04-19

## 2019-04-19 RX ADMIN — CEFAZOLIN 2 G: 330 INJECTION, POWDER, FOR SOLUTION INTRAMUSCULAR; INTRAVENOUS at 02:04

## 2019-04-19 RX ADMIN — MORPHINE SULFATE 4 MG: 8 INJECTION INTRAVENOUS at 02:04

## 2019-04-19 RX ADMIN — LIDOCAINE HYDROCHLORIDE 500 MG: 10 INJECTION, SOLUTION INFILTRATION; PERINEURAL at 02:04

## 2019-04-19 RX ADMIN — OXYCODONE HYDROCHLORIDE 5 MG: 5 TABLET ORAL at 04:04

## 2019-04-19 RX ADMIN — ONDANSETRON 8 MG: 2 INJECTION INTRAMUSCULAR; INTRAVENOUS at 02:04

## 2019-04-19 NOTE — ED PROVIDER NOTES
Encounter Date: 4/19/2019    SCRIBE #1 NOTE: I, Ermelinda Albrecht, am scribing for, and in the presence of, Young العراقي MD.       History     Chief Complaint   Patient presents with    Hand Injury     left index finger       Time seen by provider: 1:49 PM on 04/19/2019    Cornelius Couch is a 53 y.o. male who presents to the ED with an onset of a laceration just below the left index finger nailbed. He states he accidentally sliced his finger by accident on the skill saw ~30 minutes ago and rates his pain at a 9/10 in severity. The patient reports doing this to his left pinky finger in the past that required surgery. His tetanus is UTD as of last year. He is right-hand dominant. The patient denies history of diabetes or high blood pressure. Codeine drug allergy noted.    The history is provided by the patient.     Review of patient's allergies indicates:   Allergen Reactions    Codeine Itching     Anything with codeine     Past Medical History:   Diagnosis Date    Encounter for blood transfusion      Past Surgical History:   Procedure Laterality Date    bone fusion Left 1977    ankle    COLONOSCOPY N/A 11/12/2018    Performed [facility]    FRACTURE SURGERY      finger left pinky,     TONSILLECTOMY       Family History   Problem Relation Age of Onset    Alzheimer's disease Mother     Heart disease Father      Social History     Tobacco Use    Smoking status: Former Smoker     Types: Cigarettes     Last attempt to quit: 4/18/2009     Years since quitting: 10.0    Smokeless tobacco: Former User   Substance Use Topics    Alcohol use: Yes     Alcohol/week: 0.6 oz     Types: 1 Cans of beer per week     Comment: occ    Drug use: No     Review of Systems   Eyes: Negative for visual disturbance.   Gastrointestinal: Negative for vomiting.   Skin: Positive for wound (tip of the left 2nd digit).   Neurological: Negative for syncope and light-headedness.   Psychiatric/Behavioral: Negative  for suicidal ideas.     Physical Exam     Initial Vitals [04/19/19 1328]   BP Pulse Resp Temp SpO2   (!) 161/89 95 18 98 °F (36.7 °C) 99 %      MAP       --         Physical Exam    Nursing note and vitals reviewed.  Constitutional: He appears well-developed. No distress.   HENT:   Head: Normocephalic and atraumatic.   Musculoskeletal: Normal range of motion.        Right hand: Right index finger: Exhibits bleeding. Injuries: laceration.   Almost complete amputation of the left 2nd digit distal phalanx proximal to nailbed. See pictures below.    Neurological: He is alert and oriented to person, place, and time. No cranial nerve deficit.   Skin: Skin is warm and dry. Laceration noted. No rash noted.   Psychiatric: He has a normal mood and affect.               ED Course   Lac Repair  Date/Time: 4/19/2019 3:39 PM  Performed by: Young العراقي MD  Authorized by: Young العراقي MD   Location: left 2nd digit distal phalanx proximal to nailbed.  Anesthesia: local infiltration    Anesthesia:  Local Anesthetic: lidocaine 1% without epinephrine  Anesthetic total: 4 mL  Patient sedated: no  Preparation: Patient was prepped and draped in the usual sterile fashion.  Irrigation solution: saline  Irrigation method: syringe  Amount of cleaning: extensive  Skin closure: 5-0 nylon  Number of sutures: 12  Technique: simple  Approximation: close  Approximation difficulty: simple  Patient tolerance: Patient tolerated the procedure well with no immediate complications        Labs Reviewed - No data to display     Imaging Results           X-Ray Hand 3 view Left (Final result)  Result time 04/19/19 13:54:54    Final result by Maximo Rosa MD (04/19/19 13:54:54)                 Impression:      Significant bone and soft tissue injury involving the distal phalanx of the 2nd digit.  Please see above discussion.    This report was flagged in Epic as abnormal.      Electronically signed by: Maximo Rosa  MD  Date:    04/19/2019  Time:    13:54             Narrative:    EXAMINATION:  XR HAND COMPLETE 3 VIEW LEFT    CLINICAL HISTORY:  laceration;.    TECHNIQUE:  PA, lateral, and oblique views of the left hand were performed.    COMPARISON:  None    FINDINGS:  There is significant abnormality involving the distal phalanx of the 2nd digit.  There has been traumatic partial amputation of the tuft of the distal phalanx with bone fragments seen adjacent to significant soft tissue injury.  The left hand is otherwise normal                                 Medical Decision Making:   History:   Old Medical Records: I decided to obtain old medical records.  Clinical Tests:   Radiological Study: Ordered and Reviewed            Scribe Attestation:   Scribe #1: I performed the above scribed service and the documentation accurately describes the services I performed. I attest to the accuracy of the note.      Attending Attestation:     Physician Attestation for Scribe:    I, Dr. Young العراقي, personally performed the services described in this documentation.   All medical record entries made by the scribe were at my direction and in my presence.   I have reviewed the chart and agree that the record is accurate and complete.   Young العراقي MD  11:10 PM 04/19/2019     DISCLAIMER: This note was prepared with [employer] voice recognition transcription software. Garbled syntax, mangled pronouns, and other bizarre constructions may be attributed to that software system.          ED Course as of Apr 19 1651 Fri Apr 19, 2019   1401 FINDINGS:  There is significant abnormality involving the distal phalanx of the 2nd digit.  There has been traumatic partial amputation of the tuft of the distal phalanx with bone fragments seen adjacent to significant soft tissue injury.  The left hand is otherwise normal  Impression       Significant bone and soft tissue injury involving the distal phalanx of the 2nd digit.  Please see  above discussion.    This report was flagged in Epic as abnormal.      Electronically signed by: Maximo Rosa MD  Date: 04/19/2019  Time: 13:54        [KB]   [id number] 53-year-old right-handed M presents today with amputation of distal phalanx of left 2nd digit of hand. Spoke with Dr. De León, Orthopedics, who recommends Clindamycin and repair finger at bedside with close follow-up on Monday.  Finger repaired with digital block.  Had long discussion with patient regarding likely loss of distal finger given extent of injury. Patient understands importance of close follow-up with orthopedic hand surgeon on Monday.  Given prescription of oral clindamycin, pain medications and Zofran. Pt understands and agrees with discharge instructions. Pt also given strict return precautions for any new or worsening symptoms and plans to follow up closely with PCP.      [KB]      ED Course User Index  [KB] Ermelinda Albrecht     Clinical Impression:       ICD-10-CM ICD-9-CM   1. Traumatic amputation of fingertip, initial encounter S68.129A 886.0         Disposition:   Disposition: Discharged  Condition: Stable                        Young العراقي MD  04/19/19 3347

## 2019-04-22 ENCOUNTER — TELEPHONE (OUTPATIENT)
Dept: ORTHOPEDICS | Facility: CLINIC | Age: 54
End: 2019-04-22

## 2019-04-22 ENCOUNTER — OFFICE VISIT (OUTPATIENT)
Dept: FAMILY MEDICINE | Facility: CLINIC | Age: 54
End: 2019-04-22
Payer: MEDICAID

## 2019-04-22 ENCOUNTER — TELEPHONE (OUTPATIENT)
Dept: FAMILY MEDICINE | Facility: CLINIC | Age: 54
End: 2019-04-22

## 2019-04-22 VITALS
TEMPERATURE: 98 F | BODY MASS INDEX: 20.14 KG/M2 | DIASTOLIC BLOOD PRESSURE: 77 MMHG | HEIGHT: 69 IN | SYSTOLIC BLOOD PRESSURE: 130 MMHG | HEART RATE: 79 BPM | WEIGHT: 136 LBS

## 2019-04-22 DIAGNOSIS — R11.0 NAUSEA: ICD-10-CM

## 2019-04-22 DIAGNOSIS — S68.119D TRAUMATIC AMPUTATION OF FINGERTIP, SUBSEQUENT ENCOUNTER: ICD-10-CM

## 2019-04-22 DIAGNOSIS — Z09 ENCOUNTER FOR EXAMINATION FOLLOWING TREATMENT AT HOSPITAL: Primary | ICD-10-CM

## 2019-04-22 PROCEDURE — 99213 OFFICE O/P EST LOW 20 MIN: CPT | Mod: PBBFAC,PO | Performed by: NURSE PRACTITIONER

## 2019-04-22 PROCEDURE — 99214 PR OFFICE/OUTPT VISIT, EST, LEVL IV, 30-39 MIN: ICD-10-PCS | Mod: S$PBB,,, | Performed by: NURSE PRACTITIONER

## 2019-04-22 PROCEDURE — 99999 PR PBB SHADOW E&M-EST. PATIENT-LVL III: CPT | Mod: PBBFAC,,, | Performed by: NURSE PRACTITIONER

## 2019-04-22 PROCEDURE — 99999 PR PBB SHADOW E&M-EST. PATIENT-LVL III: ICD-10-PCS | Mod: PBBFAC,,, | Performed by: NURSE PRACTITIONER

## 2019-04-22 PROCEDURE — 99214 OFFICE O/P EST MOD 30 MIN: CPT | Mod: S$PBB,,, | Performed by: NURSE PRACTITIONER

## 2019-04-22 RX ORDER — ONDANSETRON 4 MG/1
4 TABLET, FILM COATED ORAL EVERY 6 HOURS
Qty: 60 TABLET | Refills: 0 | Status: SHIPPED | OUTPATIENT
Start: 2019-04-22 | End: 2021-05-26

## 2019-04-22 RX ORDER — OXYCODONE AND ACETAMINOPHEN 5; 325 MG/1; MG/1
1 TABLET ORAL EVERY 6 HOURS PRN
Qty: 60 TABLET | Refills: 0 | Status: SHIPPED | OUTPATIENT
Start: 2019-04-22 | End: 2021-05-26

## 2019-04-22 NOTE — PROGRESS NOTES
Subjective:       Patient ID: Cornelius Couch is a 53 y.o. male.    Chief Complaint: Hospital Follow Up    Chief Complaint  Chief Complaint   Patient presents with    Hospital Follow Up       HPI  Cornelius Couch is a 53 y.o. male with medical diagnoses as listed in the medical history and problem list that presents for follow up of ER visit at [facility] for acute left second digit pain. Patient was seen and treated at [facility] ED on 4/19/19 after he accidentally cut the tip of his left index finger. He had initial x-ray showing soft tissue and bone involvement, wound was sutured with splint placed, and instructed to follow up with ortho as soon as possible, and was discharged with prescription for clindamycin 300 mg TID for 14 days and oxycodone 5 mg, #10 and zofran for nausea related to pain medication. He has been taking the oxycodone with relief but is unable to see orthopedic surgery until 5/2/19 and is out of the pain medication. He has been taking ibuprofen 600 mg in addition to the prescribed pain medication which does help with the pain but causes upset stomach. He has been performing wound care as instructed, washing area with warm water and soap daily, applying OTC antibiotic ointment, telfa, and then the splint.  He is here today for a refill of the pain medication to take until seen by orthopedist. Due to his current insurance, it was not possible to get the patient a sooner appointment in orthopedics. Today, patient rates his pain a 6 on the 0-10 scale and with medication rates pain a 2-3 on 0-10 scale. Symptoms are constant.  Patient is regularly followed for anemia, BPH, and vitamin D deficiency. Blood pressure today is 130/77, BMI is 30.08, and he has lost 1 pound since his last visit on 2/21/19. Established patient with last clinic appointment 2/21/2019.        PAST MEDICAL HISTORY:  Past Medical History:   Diagnosis Date    Encounter for blood transfusion        PAST  SURGICAL HISTORY:  Past Surgical History:   Procedure Laterality Date    bone fusion Left 1977    ankle    COLONOSCOPY N/A 11/12/2018    Performed by Car Coats [facility] at [facility] ENDO    FRACTURE SURGERY      finger left pinky,     TONSILLECTOMY         SOCIAL HISTORY:  Social History     Socioeconomic History    Marital status:      Spouse name: Not on file    Number of children: Not on file    Years of education: Not on file    Highest education level: Not on file   Occupational History    Not on file   Social Needs    Financial resource strain: Not on file    Food insecurity:     Worry: Not on file     Inability: Not on file    Transportation needs:     Medical: Not on file     Non-medical: Not on file   Tobacco Use    Smoking status: Former Smoker     Types: Cigarettes     Last attempt to quit: 4/18/2009     Years since quitting: 10.0    Smokeless tobacco: Former User   Substance and Sexual Activity    Alcohol use: Yes     Alcohol/week: 0.6 oz     Types: 1 Cans of beer per week     Comment: occ    Drug use: No    Sexual activity: Not on file   Lifestyle    Physical activity:     Days per week: Not on file     Minutes per session: Not on file    Stress: Not on file   Relationships    Social connections:     Talks on phone: Not on file     Gets together: Not on file     Attends Rastafarian service: Not on file     Active member of club or organization: Not on file     Attends meetings of clubs or organizations: Not on file     Relationship status: Not on file   Other Topics Concern    Not on file   Social History Narrative    Not on file       FAMILY HISTORY:  Family History   Problem Relation Age of Onset    Alzheimer's disease Mother     Heart disease Father        ALLERGIES AND MEDICATIONS: updated and reviewed.  Review of patient's allergies indicates:   Allergen Reactions    Codeine Itching     Anything with codeine     Current Outpatient Medications   Medication Sig Dispense  "Refill    clindamycin (CLEOCIN) 300 MG capsule Take 1 capsule (300 mg total) by mouth every 8 (eight) hours. for 14 days 42 capsule 0    ondansetron (ZOFRAN) 4 MG tablet Take 1 tablet (4 mg total) by mouth every 6 (six) hours. 60 tablet 0    oxyCODONE-acetaminophen (PERCOCET) 5-325 mg per tablet Take 1 tablet by mouth every 6 (six) hours as needed for Pain. 60 tablet 0     No current facility-administered medications for this visit.        I have reviewed the patient's medical, family, and social history in detail and updated the computerized patient record.    Review of Systems   Constitutional: Negative for activity change, appetite change, chills, diaphoresis and fever.   HENT: Negative for congestion, ear pain, postnasal drip, rhinorrhea, sinus pressure, sinus pain and sore throat.    Eyes: Negative for visual disturbance.   Respiratory: Negative for cough and shortness of breath.    Cardiovascular: Negative for chest pain, palpitations and leg swelling.   Gastrointestinal: Negative for abdominal pain, constipation, diarrhea, nausea and vomiting.   Genitourinary: Negative for difficulty urinating and dysuria.   Musculoskeletal: Positive for arthralgias and joint swelling. Negative for gait problem.        Pain to left index finger DIP joint with swelling.   Skin: Positive for wound.        Sutures in place to distal left index finger with some serosanguinous oozing noted   Neurological: Negative for dizziness, weakness and headaches.   Psychiatric/Behavioral: Positive for sleep disturbance. Negative for dysphoric mood. The patient is not nervous/anxious.         Pain is affecting sleep.          Objective:      Vitals:    04/22/19 1613   BP: 130/77   BP Location: Right arm   Patient Position: Sitting   BP Method: Large (Automatic)   Pulse: 79   Temp: 98.3 °F (36.8 °C)   TempSrc: Oral   Weight: 61.7 kg (136 lb)   Height: 5' 9" (1.753 m)     Physical Exam   Constitutional: He is oriented to person, place, and " time. Vital signs are normal. He appears well-developed and well-nourished. No distress.   Blood pressure 130/77   HENT:   Head: Normocephalic and atraumatic.   Right Ear: Hearing, tympanic membrane, external ear and ear canal normal.   Left Ear: Hearing, tympanic membrane, external ear and ear canal normal.   Nose: Nose normal.   Mouth/Throat: Oropharynx is clear and moist and mucous membranes are normal. No oropharyngeal exudate.   Eyes: Pupils are equal, round, and reactive to light. Conjunctivae, EOM and lids are normal. Right eye exhibits no discharge. Left eye exhibits no discharge. Right conjunctiva is not injected. Left conjunctiva is not injected.   Neck: Normal range of motion. Neck supple. Normal carotid pulses present. Carotid bruit is not present. Normal range of motion present.   Cardiovascular: Normal rate, regular rhythm, S1 normal, S2 normal, normal heart sounds, intact distal pulses and normal pulses.   No murmur heard.  Pulses:       Carotid pulses are 2+ on the right side, and 2+ on the left side.       Radial pulses are 2+ on the right side, and 2+ on the left side.        Posterior tibial pulses are 2+ on the right side, and 2+ on the left side.   No edema   Pulmonary/Chest: Effort normal and breath sounds normal. No respiratory distress. He has no decreased breath sounds. He has no wheezes. He has no rhonchi. He has no rales.   Musculoskeletal: He exhibits no edema.        Left hand: He exhibits tenderness, deformity, laceration and swelling.   Sutures to left index finger DIP joint with swelling and serosanguenous drainage present. Sensation intact to monofilament to all areas of finger, including tip. Decreased ROM to left index finger.    Lymphadenopathy:        Head (right side): No submental, no submandibular and no tonsillar adenopathy present.        Head (left side): No submental, no submandibular and no tonsillar adenopathy present.     He has no cervical adenopathy.        Right: No  supraclavicular adenopathy present.        Left: No supraclavicular adenopathy present.   Neurological: He is alert and oriented to person, place, and time. He has normal strength. No sensory deficit. Gait normal.   Sensation intact to left index finger when tested with monofilament.   Skin: Skin is warm. Capillary refill takes less than 2 seconds. Laceration noted. He is not diaphoretic. No pallor.   Swelling present to left index finger DIP joint with sutures in place, serosanguinous drainage.    Psychiatric: He has a normal mood and affect. His speech is normal and behavior is normal. Judgment and thought content normal. His mood appears not anxious. Cognition and memory are normal. He does not exhibit a depressed mood.   Nursing note and vitals reviewed.        Assessment:       1. Encounter for examination following treatment at hospital    2. Traumatic amputation of fingertip, subsequent encounter, left index finger    3. Nausea    4. BMI 20.0-20.9, adult          Plan:       Cornelius was seen today for hospital follow up.    Diagnoses and all orders for this visit:    Encounter for examination following treatment at hospital   Hospital records, discharge summary,  test results, blood work results reviewed, medication list reconciled.    Traumatic amputation of fingertip, subsequent encounter, left index finger  -     oxyCODONE-acetaminophen (PERCOCET) 5-325 mg per tablet; Take 1 tablet by mouth every 6 (six) hours as needed for Pain.  -  queried without inappropriate activity. Provided with enough medication to last until scheduled orthopedic surgery visit  - Xray 4/19/19: There is significant abnormality involving the distal phalanx of the 2nd digit.  There has been traumatic partial amputation of the tuft of the distal phalanx with bone fragments seen adjacent to significant soft tissue injury.   - Patient has appointment scheduled with orthopedic surgeon on 5/2/19 which is later than desired but earliest  available appointment due to patient insurance  - Patient advised to continue clindamycin as prescribed  - Wound care as instructed, clean area with warm water and mild soap, apply OTC antibiotic ointment, telfa and splint. Wrapped in Ace in office today.  - Patient instructed to go to ER at [facility] for decreased sensation to left index finger, purulent drainage, increased pain, fever, any detrimental changes    Nausea  -     ondansetron (ZOFRAN) 4 MG tablet; Take 1 tablet (4 mg total) by mouth every 6 (six) hours.  - Patient experiences nausea with opioid pain medication    BMI 20.0-20.9, adult   BMI is 20.08 today and patient has lost 1 pound since visit 2/21/19   Maintain healthy weight with heathy diet and exercise/active lifestyle    Follow up if symptoms worsen or fail to improve, for Keep scheduled appt with ortho 5/2/19.

## 2019-04-22 NOTE — TELEPHONE ENCOUNTER
----- Message from Kylah Carter sent at 4/22/2019 12:19 PM CDT -----  Contact: self [phone]  Patient severed finger on Friday.  Went to [facility] ER.  Has no pain meds left, they only gave him enough for Saturday and Sunday.  Would like to be seen by someone to get rx or if you could give him an rx.  He on Medicaid and only ortho he can get is in 2 weeks.  He said any pain meds that he gets make him sick so he would need something for nausea also.  [facility]ses [facility] [address].  Please call patient at [phone] to advise.

## 2019-04-22 NOTE — TELEPHONE ENCOUNTER
----- Message from Rick Bowie sent at 4/22/2019 10:48 AM CDT -----  Contact: Patient  Type:  Sooner Apoointment Request    Caller is requesting a sooner appointment.  Caller declined first available appointment listed below.  Caller will not accept being placed on the waitlist and is requesting a message be sent to doctor.    Name of Caller:  Patient  When is the first available appointment?  5/2/19  Symptoms:  ER follow up, [facility], left index finger amputation    Best Call Back Number:  [phone]  Additional Information:  Please advise of earlier date

## 2019-05-02 ENCOUNTER — OFFICE VISIT (OUTPATIENT)
Dept: ORTHOPEDICS | Facility: CLINIC | Age: 54
End: 2019-05-02
Payer: MEDICAID

## 2019-05-02 ENCOUNTER — HOSPITAL ENCOUNTER (OUTPATIENT)
Dept: RADIOLOGY | Facility: HOSPITAL | Age: 54
Discharge: HOME | End: 2019-05-02
Attending: ORTHOPAEDIC SURGERY
Payer: MEDICAID

## 2019-05-02 VITALS
HEIGHT: 69 IN | HEART RATE: 80 BPM | SYSTOLIC BLOOD PRESSURE: 149 MMHG | BODY MASS INDEX: 20.14 KG/M2 | DIASTOLIC BLOOD PRESSURE: 80 MMHG | WEIGHT: 136 LBS

## 2019-05-02 DIAGNOSIS — S68.119A TRAUMATIC AMPUTATION OF FINGERTIP, INITIAL ENCOUNTER: ICD-10-CM

## 2019-05-02 DIAGNOSIS — S68.119A TRAUMATIC AMPUTATION OF FINGERTIP, INITIAL ENCOUNTER: Primary | ICD-10-CM

## 2019-05-02 PROCEDURE — 99999 PR PBB SHADOW E&M-EST. PATIENT-LVL III: CPT | Mod: PBBFAC,,, | Performed by: ORTHOPAEDIC SURGERY

## 2019-05-02 PROCEDURE — 73140 XR FINGER 2 OR MORE VIEWS: ICD-10-PCS | Mod: 26,,, | Performed by: RADIOLOGY

## 2019-05-02 PROCEDURE — 73140 X-RAY EXAM OF FINGER(S): CPT | Mod: TC,PO

## 2019-05-02 PROCEDURE — 99213 OFFICE O/P EST LOW 20 MIN: CPT | Mod: PBBFAC,25,PN | Performed by: ORTHOPAEDIC SURGERY

## 2019-05-02 PROCEDURE — 73140 X-RAY EXAM OF FINGER(S): CPT | Mod: 26,,, | Performed by: RADIOLOGY

## 2019-05-02 PROCEDURE — 99203 PR OFFICE/OUTPT VISIT, NEW, LEVL III, 30-44 MIN: ICD-10-PCS | Mod: S$PBB,,, | Performed by: ORTHOPAEDIC SURGERY

## 2019-05-02 PROCEDURE — 99999 PR PBB SHADOW E&M-EST. PATIENT-LVL III: ICD-10-PCS | Mod: PBBFAC,,, | Performed by: ORTHOPAEDIC SURGERY

## 2019-05-02 PROCEDURE — 99203 OFFICE O/P NEW LOW 30 MIN: CPT | Mod: S$PBB,,, | Performed by: ORTHOPAEDIC SURGERY

## 2019-05-04 NOTE — H&P
2019    Chief Complaint:  Chief Complaint   Patient presents with    Left Hand - Injury     2nd finger  doi 19       HPI:  Cornelius Couch is a 53 y.o. male, who presents to clinic today has a history of a left index finger skill saw injury.  This occurred approximately 2 weeks prior to his visit.  He states that he was seen in the emergency room and the wound was cleaned and closed.  He is here today for evaluation.  He is performing daily dressing changes and washes.  He still has mild pain but states that he has not had any significant redness or swelling.  He has no other complaints.    PMHX:  Past Medical History:   Diagnosis Date    Encounter for blood transfusion        PSHX:  Past Surgical History:   Procedure Laterality Date    bone fusion Left     ankle    COLONOSCOPY N/A 2018    [facility]    FRACTURE SURGERY      finger left pinky,     TONSILLECTOMY         FMHX:  Family History   Problem Relation Age of Onset    Alzheimer's disease Mother     Heart disease Father        SOCHX:  Social History     Tobacco Use    Smoking status: Former Smoker     Types: Cigarettes     Last attempt to quit: 2009     Years since quitting: 10.0    Smokeless tobacco: Former User   Substance Use Topics    Alcohol use: Yes     Alcohol/week: 0.6 oz     Types: 1 Cans of beer per week     Comment: occ       ALLERGIES:  Codeine    CURRENT MEDICATIONS:  Current Outpatient Medications on File Prior to Visit   Medication Sig Dispense Refill    [] clindamycin (CLEOCIN) 300 MG capsule Take 1 capsule (300 mg total) by mouth every 8 (eight) hours. for 14 days 42 capsule 0    ondansetron (ZOFRAN) 4 MG tablet Take 1 tablet (4 mg total) by mouth every 6 (six) hours. 60 tablet 0    oxyCODONE-acetaminophen (PERCOCET) 5-325 mg per tablet Take 1 tablet by mouth every 6 (six) hours as needed for Pain. 60 tablet 0     No current facility-administered medications on  "file prior to visit.        REVIEW OF SYSTEMS:  Review of Systems   Constitutional: Negative.    HENT: Negative for hearing loss, nosebleeds and sore throat.    Respiratory: Negative for shortness of breath and wheezing.    Cardiovascular: Negative for chest pain and palpitations.   Gastrointestinal: Negative for heartburn, nausea and vomiting.   Genitourinary: Negative for dysuria and urgency.   Skin: Negative.    Neurological: Negative for seizures, loss of consciousness and weakness.       GENERAL PHYSICAL EXAM:   BP (!) 149/80 Comment: md notified  Pulse 80   Ht 5' 9" (1.753 m)   Wt 61.7 kg (136 lb 0.4 oz)   BMI 20.09 kg/m²    GEN: well developed, well nourished, no acute distress   HENT: Normocephalic, atraumatic   EYES: No discharge, conjunctiva normal   NECK: Supple, non-tender   PULM: No wheezing, no respiratory distress   CV: RRR   ABD: Soft, non-tender    ORTHO EXAM:   Examination of the left hand and index finger reveals that there is a laceration at the tip of the finger.  There are sutures in place. The remaining skin is all viable.  There is no surrounding erythema.  There is mild edema.  There is no significant drainage or fluctuance.  He is able to flex the finger at the PIP and DIP joints. He does have capillary refill less than 2 sec    RADIOLOGY:   X-rays of the left index finger taken in clinic today. He is noted to have a fracture of the distal phalanx with an obvious saw injury.  The fracture alignment remained stable.  There is no sign of an osteomyelitis currently.    ASSESSMENT:   Left index finger table saw injury with fracture of the distal phalanx and nail bed laceration    PLAN:  1.  His sutures were removed today and the wound was cleaned    2.  A bulky dressing was placed    3.  He will continue daily dressing changes and washing    4.  Will follow up with me in 2 weeks with repeat x-ray of the left index finger which point I will consider beginning to leave the wound open to air  "

## 2021-03-19 ENCOUNTER — TELEPHONE (OUTPATIENT)
Dept: ADMINISTRATIVE | Facility: OTHER | Age: 56
End: 2021-03-19

## 2021-04-09 ENCOUNTER — TELEPHONE (OUTPATIENT)
Dept: ADMINISTRATIVE | Facility: OTHER | Age: 56
End: 2021-04-09

## 2021-05-06 ENCOUNTER — PATIENT MESSAGE (OUTPATIENT)
Dept: RESEARCH | Facility: HOSPITAL | Age: 56
End: 2021-05-06

## 2021-05-26 ENCOUNTER — OFFICE VISIT (OUTPATIENT)
Dept: FAMILY MEDICINE | Facility: CLINIC | Age: 56
End: 2021-05-26
Payer: COMMERCIAL

## 2021-05-26 VITALS
HEART RATE: 80 BPM | HEIGHT: 69 IN | BODY MASS INDEX: 20.73 KG/M2 | WEIGHT: 140 LBS | SYSTOLIC BLOOD PRESSURE: 136 MMHG | DIASTOLIC BLOOD PRESSURE: 80 MMHG

## 2021-05-26 DIAGNOSIS — Z12.5 PROSTATE CANCER SCREENING: ICD-10-CM

## 2021-05-26 DIAGNOSIS — R35.0 BENIGN PROSTATIC HYPERPLASIA WITH URINARY FREQUENCY: ICD-10-CM

## 2021-05-26 DIAGNOSIS — Z00.00 GENERAL MEDICAL EXAM: ICD-10-CM

## 2021-05-26 DIAGNOSIS — E55.9 VITAMIN D DEFICIENCY: ICD-10-CM

## 2021-05-26 DIAGNOSIS — J30.2 SEASONAL ALLERGIES: ICD-10-CM

## 2021-05-26 DIAGNOSIS — N40.1 BENIGN PROSTATIC HYPERPLASIA WITH URINARY FREQUENCY: ICD-10-CM

## 2021-05-26 DIAGNOSIS — Z12.11 SPECIAL SCREENING FOR MALIGNANT NEOPLASMS, COLON: Primary | ICD-10-CM

## 2021-05-26 PROCEDURE — 99204 PR OFFICE/OUTPT VISIT, NEW, LEVL IV, 45-59 MIN: ICD-10-PCS | Mod: S$GLB,,, | Performed by: NURSE PRACTITIONER

## 2021-05-26 PROCEDURE — 3008F PR BODY MASS INDEX (BMI) DOCUMENTED: ICD-10-PCS | Mod: S$GLB,,, | Performed by: NURSE PRACTITIONER

## 2021-05-26 PROCEDURE — 3008F BODY MASS INDEX DOCD: CPT | Mod: S$GLB,,, | Performed by: NURSE PRACTITIONER

## 2021-05-26 PROCEDURE — 99204 OFFICE O/P NEW MOD 45 MIN: CPT | Mod: S$GLB,,, | Performed by: NURSE PRACTITIONER

## 2021-05-26 RX ORDER — AZELASTINE 1 MG/ML
1 SPRAY, METERED NASAL 2 TIMES DAILY
Qty: 30 ML | Refills: 1 | Status: SHIPPED | OUTPATIENT
Start: 2021-05-26 | End: 2022-05-30

## 2021-05-27 LAB
25(OH)D3 SERPL-MCNC: 20 NG/ML (ref 30–100)
ALBUMIN SERPL-MCNC: 4.6 G/DL (ref 3.6–5.1)
ALBUMIN/GLOB SERPL: 1.8 (CALC) (ref 1–2.5)
ALP SERPL-CCNC: 58 U/L (ref 35–144)
ALT SERPL-CCNC: 12 U/L (ref 9–46)
APPEARANCE UR: CLEAR
AST SERPL-CCNC: 17 U/L (ref 10–35)
BACTERIA #/AREA URNS HPF: ABNORMAL /HPF
BACTERIA UR CULT: ABNORMAL
BASOPHILS # BLD AUTO: 78 CELLS/UL (ref 0–200)
BASOPHILS NFR BLD AUTO: 1.4 %
BILIRUB SERPL-MCNC: 0.5 MG/DL (ref 0.2–1.2)
BILIRUB UR QL STRIP: NEGATIVE
BUN SERPL-MCNC: 13 MG/DL (ref 7–25)
BUN/CREAT SERPL: NORMAL (CALC) (ref 6–22)
CALCIUM SERPL-MCNC: 9.8 MG/DL (ref 8.6–10.3)
CAOX CRY #/AREA URNS HPF: ABNORMAL /HPF
CHLORIDE SERPL-SCNC: 104 MMOL/L (ref 98–110)
CHOLEST SERPL-MCNC: 250 MG/DL
CHOLEST/HDLC SERPL: 4.2 (CALC)
CO2 SERPL-SCNC: 28 MMOL/L (ref 20–32)
COLOR UR: YELLOW
CREAT SERPL-MCNC: 1.05 MG/DL (ref 0.7–1.33)
EOSINOPHIL # BLD AUTO: 112 CELLS/UL (ref 15–500)
EOSINOPHIL NFR BLD AUTO: 2 %
ERYTHROCYTE [DISTWIDTH] IN BLOOD BY AUTOMATED COUNT: 13.2 % (ref 11–15)
GLOBULIN SER CALC-MCNC: 2.6 G/DL (CALC) (ref 1.9–3.7)
GLUCOSE SERPL-MCNC: 92 MG/DL (ref 65–139)
GLUCOSE UR QL STRIP: NEGATIVE
HCT VFR BLD AUTO: 43.1 % (ref 38.5–50)
HDLC SERPL-MCNC: 60 MG/DL
HGB BLD-MCNC: 14.1 G/DL (ref 13.2–17.1)
HGB UR QL STRIP: NEGATIVE
HYALINE CASTS #/AREA URNS LPF: ABNORMAL /LPF
KETONES UR QL STRIP: ABNORMAL
LDLC SERPL CALC-MCNC: 165 MG/DL (CALC)
LEUKOCYTE ESTERASE UR QL STRIP: NEGATIVE
LYMPHOCYTES # BLD AUTO: 1658 CELLS/UL (ref 850–3900)
LYMPHOCYTES NFR BLD AUTO: 29.6 %
MCH RBC QN AUTO: 29.3 PG (ref 27–33)
MCHC RBC AUTO-ENTMCNC: 32.7 G/DL (ref 32–36)
MCV RBC AUTO: 89.4 FL (ref 80–100)
MONOCYTES # BLD AUTO: 588 CELLS/UL (ref 200–950)
MONOCYTES NFR BLD AUTO: 10.5 %
NEUTROPHILS # BLD AUTO: 3164 CELLS/UL (ref 1500–7800)
NEUTROPHILS NFR BLD AUTO: 56.5 %
NITRITE UR QL STRIP: NEGATIVE
NONHDLC SERPL-MCNC: 190 MG/DL (CALC)
PH UR STRIP: ABNORMAL [PH] (ref 5–8)
PLATELET # BLD AUTO: 355 THOUSAND/UL (ref 140–400)
PMV BLD REES-ECKER: 10.3 FL (ref 7.5–12.5)
POTASSIUM SERPL-SCNC: 4.4 MMOL/L (ref 3.5–5.3)
PROT SERPL-MCNC: 7.2 G/DL (ref 6.1–8.1)
PROT UR QL STRIP: NEGATIVE
PSA SERPL-MCNC: 0.8 NG/ML
RBC # BLD AUTO: 4.82 MILLION/UL (ref 4.2–5.8)
RBC #/AREA URNS HPF: ABNORMAL /HPF
SODIUM SERPL-SCNC: 139 MMOL/L (ref 135–146)
SP GR UR STRIP: 1.02 (ref 1–1.03)
SQUAMOUS #/AREA URNS HPF: ABNORMAL /HPF
TRIGL SERPL-MCNC: 120 MG/DL
TSH SERPL-ACNC: 1.68 MIU/L (ref 0.4–4.5)
WBC # BLD AUTO: 5.6 THOUSAND/UL (ref 3.8–10.8)
WBC #/AREA URNS HPF: ABNORMAL /HPF

## 2021-05-28 ENCOUNTER — TELEPHONE (OUTPATIENT)
Dept: FAMILY MEDICINE | Facility: CLINIC | Age: 56
End: 2021-05-28

## 2021-05-28 ENCOUNTER — PATIENT MESSAGE (OUTPATIENT)
Dept: GASTROENTEROLOGY | Facility: CLINIC | Age: 56
End: 2021-05-28

## 2021-05-28 DIAGNOSIS — E78.2 MIXED HYPERLIPIDEMIA: Primary | ICD-10-CM

## 2021-05-28 RX ORDER — ATORVASTATIN CALCIUM 20 MG/1
20 TABLET, FILM COATED ORAL DAILY
Qty: 30 TABLET | Refills: 3 | Status: SHIPPED | OUTPATIENT
Start: 2021-05-28 | End: 2022-05-30

## 2021-06-07 ENCOUNTER — TELEPHONE (OUTPATIENT)
Dept: GASTROENTEROLOGY | Facility: CLINIC | Age: 56
End: 2021-06-07

## 2021-06-07 ENCOUNTER — PATIENT MESSAGE (OUTPATIENT)
Dept: GASTROENTEROLOGY | Facility: CLINIC | Age: 56
End: 2021-06-07

## 2021-06-07 DIAGNOSIS — Z86.010 HISTORY OF COLON POLYPS: Primary | ICD-10-CM

## 2021-07-02 ENCOUNTER — HOSPITAL ENCOUNTER (EMERGENCY)
Facility: HOSPITAL | Age: 56
Discharge: HOME | End: 2021-07-02
Attending: EMERGENCY MEDICINE
Payer: COMMERCIAL

## 2021-07-02 VITALS
SYSTOLIC BLOOD PRESSURE: 150 MMHG | HEIGHT: 69 IN | TEMPERATURE: 98 F | RESPIRATION RATE: 18 BRPM | HEART RATE: 76 BPM | OXYGEN SATURATION: 97 % | BODY MASS INDEX: 20.73 KG/M2 | WEIGHT: 140 LBS | DIASTOLIC BLOOD PRESSURE: 84 MMHG

## 2021-07-02 DIAGNOSIS — S05.00XA CORNEAL ABRASION, UNSPECIFIED LATERALITY, INITIAL ENCOUNTER: Primary | ICD-10-CM

## 2021-07-02 PROCEDURE — 25000003 PHARM REV CODE 250: Performed by: NURSE PRACTITIONER

## 2021-07-02 PROCEDURE — 99283 EMERGENCY DEPT VISIT LOW MDM: CPT

## 2021-07-02 RX ORDER — ERYTHROMYCIN 5 MG/G
OINTMENT OPHTHALMIC
Qty: 1 TUBE | Refills: 0 | Status: ON HOLD | OUTPATIENT
Start: 2021-07-02 | End: 2021-09-16 | Stop reason: ALTCHOICE

## 2021-07-02 RX ORDER — PROPARACAINE HYDROCHLORIDE 5 MG/ML
1 SOLUTION/ DROPS OPHTHALMIC
Status: COMPLETED | OUTPATIENT
Start: 2021-07-02 | End: 2021-07-02

## 2021-07-02 RX ADMIN — PROPARACAINE HYDROCHLORIDE 1 DROP: 5 SOLUTION/ DROPS OPHTHALMIC at 05:07

## 2021-07-02 RX ADMIN — FLUORESCEIN SODIUM 1 EACH: 1 STRIP OPHTHALMIC at 05:07

## 2021-07-09 ENCOUNTER — LAB VISIT (OUTPATIENT)
Dept: PRIMARY CARE CLINIC | Facility: CLINIC | Age: 56
End: 2021-07-09
Payer: COMMERCIAL

## 2021-07-09 DIAGNOSIS — Z01.818 PRE-OP TESTING: ICD-10-CM

## 2021-07-09 PROCEDURE — U0005 INFEC AGEN DETEC AMPLI PROBE: HCPCS | Performed by: INTERNAL MEDICINE

## 2021-07-09 PROCEDURE — U0003 INFECTIOUS AGENT DETECTION BY NUCLEIC ACID (DNA OR RNA); SEVERE ACUTE RESPIRATORY SYNDROME CORONAVIRUS 2 (SARS-COV-2) (CORONAVIRUS DISEASE [COVID-19]), AMPLIFIED PROBE TECHNIQUE, MAKING USE OF HIGH THROUGHPUT TECHNOLOGIES AS DESCRIBED BY CMS-2020-01-R: HCPCS | Performed by: INTERNAL MEDICINE

## 2021-07-10 LAB
SARS-COV-2 RNA RESP QL NAA+PROBE: NOT DETECTED
SARS-COV-2- CYCLE NUMBER: -1

## 2021-07-12 ENCOUNTER — ANESTHESIA EVENT (OUTPATIENT)
Dept: ENDOSCOPY | Facility: HOSPITAL | Age: 56
End: 2021-07-12
Payer: COMMERCIAL

## 2021-07-12 ENCOUNTER — ANESTHESIA (OUTPATIENT)
Dept: ENDOSCOPY | Facility: HOSPITAL | Age: 56
End: 2021-07-12
Payer: COMMERCIAL

## 2021-07-12 ENCOUNTER — HOSPITAL ENCOUNTER (OUTPATIENT)
Facility: HOSPITAL | Age: 56
Discharge: HOME | End: 2021-07-12
Attending: INTERNAL MEDICINE | Admitting: INTERNAL MEDICINE
Payer: COMMERCIAL

## 2021-07-12 DIAGNOSIS — Z86.010 HISTORY OF COLON POLYPS: ICD-10-CM

## 2021-07-12 DIAGNOSIS — K63.5 POLYP OF COLON, UNSPECIFIED PART OF COLON, UNSPECIFIED TYPE: Primary | ICD-10-CM

## 2021-07-12 PROBLEM — Z86.0100 HISTORY OF COLON POLYPS: Status: ACTIVE | Noted: 2021-07-12

## 2021-07-12 PROCEDURE — 45385 COLONOSCOPY W/LESION REMOVAL: CPT | Performed by: INTERNAL MEDICINE

## 2021-07-12 PROCEDURE — 45385 PR COLONOSCOPY,REMV LESN,SNARE: ICD-10-PCS | Mod: 33,,, | Performed by: INTERNAL MEDICINE

## 2021-07-12 PROCEDURE — 45380 COLONOSCOPY AND BIOPSY: CPT | Performed by: INTERNAL MEDICINE

## 2021-07-12 PROCEDURE — 37000009 HC ANESTHESIA EA ADD 15 MINS: Performed by: INTERNAL MEDICINE

## 2021-07-12 PROCEDURE — D9220A PRA ANESTHESIA: ICD-10-PCS | Mod: 33,,, | Performed by: ANESTHESIOLOGY

## 2021-07-12 PROCEDURE — 88305 TISSUE EXAM BY PATHOLOGIST: CPT | Mod: 59 | Performed by: PATHOLOGY

## 2021-07-12 PROCEDURE — 45385 COLONOSCOPY W/LESION REMOVAL: CPT | Mod: 33,,, | Performed by: INTERNAL MEDICINE

## 2021-07-12 PROCEDURE — 37000008 HC ANESTHESIA 1ST 15 MINUTES: Performed by: INTERNAL MEDICINE

## 2021-07-12 PROCEDURE — 45380 PR COLONOSCOPY,BIOPSY: ICD-10-PCS | Mod: 59,,, | Performed by: INTERNAL MEDICINE

## 2021-07-12 PROCEDURE — 88305 TISSUE EXAM BY PATHOLOGIST: ICD-10-PCS | Mod: 26,,, | Performed by: PATHOLOGY

## 2021-07-12 PROCEDURE — 27201012 HC FORCEPS, HOT/COLD, DISP: Performed by: INTERNAL MEDICINE

## 2021-07-12 PROCEDURE — 25000003 PHARM REV CODE 250: Performed by: NURSE ANESTHETIST, CERTIFIED REGISTERED

## 2021-07-12 PROCEDURE — 27201089 HC SNARE, DISP (ANY): Performed by: INTERNAL MEDICINE

## 2021-07-12 PROCEDURE — 45380 COLONOSCOPY AND BIOPSY: CPT | Mod: 59,,, | Performed by: INTERNAL MEDICINE

## 2021-07-12 PROCEDURE — D9220A PRA ANESTHESIA: Mod: 33,,, | Performed by: NURSE ANESTHETIST, CERTIFIED REGISTERED

## 2021-07-12 PROCEDURE — 63600175 PHARM REV CODE 636 W HCPCS: Performed by: NURSE ANESTHETIST, CERTIFIED REGISTERED

## 2021-07-12 PROCEDURE — 25000003 PHARM REV CODE 250: Performed by: INTERNAL MEDICINE

## 2021-07-12 PROCEDURE — D9220A PRA ANESTHESIA: Mod: 33,,, | Performed by: ANESTHESIOLOGY

## 2021-07-12 PROCEDURE — 88305 TISSUE EXAM BY PATHOLOGIST: CPT | Mod: 26,,, | Performed by: PATHOLOGY

## 2021-07-12 PROCEDURE — D9220A PRA ANESTHESIA: ICD-10-PCS | Mod: 33,,, | Performed by: NURSE ANESTHETIST, CERTIFIED REGISTERED

## 2021-07-12 RX ORDER — PROPOFOL 10 MG/ML
VIAL (ML) INTRAVENOUS
Status: DISCONTINUED | OUTPATIENT
Start: 2021-07-12 | End: 2021-07-12

## 2021-07-12 RX ORDER — LIDOCAINE HCL/PF 100 MG/5ML
SYRINGE (ML) INTRAVENOUS
Status: DISCONTINUED | OUTPATIENT
Start: 2021-07-12 | End: 2021-07-12

## 2021-07-12 RX ORDER — SODIUM CHLORIDE 9 MG/ML
INJECTION, SOLUTION INTRAVENOUS CONTINUOUS
Status: DISCONTINUED | OUTPATIENT
Start: 2021-07-12 | End: 2021-07-12 | Stop reason: HOSPADM

## 2021-07-12 RX ADMIN — LIDOCAINE HYDROCHLORIDE 50 MG: 20 INJECTION INTRAVENOUS at 09:07

## 2021-07-12 RX ADMIN — SODIUM CHLORIDE: 0.9 INJECTION, SOLUTION INTRAVENOUS at 08:07

## 2021-07-12 RX ADMIN — PROPOFOL 50 MG: 10 INJECTION, EMULSION INTRAVENOUS at 09:07

## 2021-07-12 RX ADMIN — PROPOFOL 200 MG: 10 INJECTION, EMULSION INTRAVENOUS at 09:07

## 2021-07-13 VITALS
OXYGEN SATURATION: 92 % | DIASTOLIC BLOOD PRESSURE: 87 MMHG | RESPIRATION RATE: 16 BRPM | HEART RATE: 74 BPM | TEMPERATURE: 98 F | SYSTOLIC BLOOD PRESSURE: 140 MMHG

## 2021-07-15 LAB
COMMENT: NORMAL
FINAL PATHOLOGIC DIAGNOSIS: NORMAL
GROSS: NORMAL
Lab: NORMAL

## 2021-08-24 ENCOUNTER — OFFICE VISIT (OUTPATIENT)
Dept: GASTROENTEROLOGY | Facility: CLINIC | Age: 56
End: 2021-08-24
Payer: COMMERCIAL

## 2021-08-24 VITALS
RESPIRATION RATE: 18 BRPM | HEIGHT: 69 IN | SYSTOLIC BLOOD PRESSURE: 155 MMHG | DIASTOLIC BLOOD PRESSURE: 80 MMHG | HEART RATE: 72 BPM | BODY MASS INDEX: 22.43 KG/M2 | WEIGHT: 151.44 LBS

## 2021-08-24 DIAGNOSIS — K63.5 POLYPOSIS OF COLON: ICD-10-CM

## 2021-08-24 DIAGNOSIS — Z86.010 HISTORY OF COLON POLYPS: Primary | ICD-10-CM

## 2021-08-24 DIAGNOSIS — K21.9 GASTROESOPHAGEAL REFLUX DISEASE, UNSPECIFIED WHETHER ESOPHAGITIS PRESENT: ICD-10-CM

## 2021-08-24 PROCEDURE — 3077F SYST BP >= 140 MM HG: CPT | Mod: CPTII,S$GLB,, | Performed by: INTERNAL MEDICINE

## 2021-08-24 PROCEDURE — 3008F BODY MASS INDEX DOCD: CPT | Mod: CPTII,S$GLB,, | Performed by: INTERNAL MEDICINE

## 2021-08-24 PROCEDURE — 99214 PR OFFICE/OUTPT VISIT, EST, LEVL IV, 30-39 MIN: ICD-10-PCS | Mod: S$GLB,,, | Performed by: INTERNAL MEDICINE

## 2021-08-24 PROCEDURE — 1126F PR PAIN SEVERITY QUANTIFIED, NO PAIN PRESENT: ICD-10-PCS | Mod: CPTII,S$GLB,, | Performed by: INTERNAL MEDICINE

## 2021-08-24 PROCEDURE — 99999 PR PBB SHADOW E&M-EST. PATIENT-LVL III: ICD-10-PCS | Mod: PBBFAC,,, | Performed by: INTERNAL MEDICINE

## 2021-08-24 PROCEDURE — 1126F AMNT PAIN NOTED NONE PRSNT: CPT | Mod: CPTII,S$GLB,, | Performed by: INTERNAL MEDICINE

## 2021-08-24 PROCEDURE — 3079F PR MOST RECENT DIASTOLIC BLOOD PRESSURE 80-89 MM HG: ICD-10-PCS | Mod: CPTII,S$GLB,, | Performed by: INTERNAL MEDICINE

## 2021-08-24 PROCEDURE — 1159F MED LIST DOCD IN RCRD: CPT | Mod: CPTII,S$GLB,, | Performed by: INTERNAL MEDICINE

## 2021-08-24 PROCEDURE — 3008F PR BODY MASS INDEX (BMI) DOCUMENTED: ICD-10-PCS | Mod: CPTII,S$GLB,, | Performed by: INTERNAL MEDICINE

## 2021-08-24 PROCEDURE — 99999 PR PBB SHADOW E&M-EST. PATIENT-LVL III: CPT | Mod: PBBFAC,,, | Performed by: INTERNAL MEDICINE

## 2021-08-24 PROCEDURE — 1159F PR MEDICATION LIST DOCUMENTED IN MEDICAL RECORD: ICD-10-PCS | Mod: CPTII,S$GLB,, | Performed by: INTERNAL MEDICINE

## 2021-08-24 PROCEDURE — 3079F DIAST BP 80-89 MM HG: CPT | Mod: CPTII,S$GLB,, | Performed by: INTERNAL MEDICINE

## 2021-08-24 PROCEDURE — 99214 OFFICE O/P EST MOD 30 MIN: CPT | Mod: S$GLB,,, | Performed by: INTERNAL MEDICINE

## 2021-08-24 PROCEDURE — 3077F PR MOST RECENT SYSTOLIC BLOOD PRESSURE >= 140 MM HG: ICD-10-PCS | Mod: CPTII,S$GLB,, | Performed by: INTERNAL MEDICINE

## 2021-08-25 DIAGNOSIS — K21.9 GASTROESOPHAGEAL REFLUX DISEASE, UNSPECIFIED WHETHER ESOPHAGITIS PRESENT: Primary | ICD-10-CM

## 2021-08-25 DIAGNOSIS — K63.5 POLYPOSIS OF COLON: ICD-10-CM

## 2021-09-13 ENCOUNTER — LAB VISIT (OUTPATIENT)
Dept: PRIMARY CARE CLINIC | Facility: CLINIC | Age: 56
End: 2021-09-13
Payer: COMMERCIAL

## 2021-09-13 DIAGNOSIS — Z01.818 PRE-OP TESTING: ICD-10-CM

## 2021-09-13 PROCEDURE — U0003 INFECTIOUS AGENT DETECTION BY NUCLEIC ACID (DNA OR RNA); SEVERE ACUTE RESPIRATORY SYNDROME CORONAVIRUS 2 (SARS-COV-2) (CORONAVIRUS DISEASE [COVID-19]), AMPLIFIED PROBE TECHNIQUE, MAKING USE OF HIGH THROUGHPUT TECHNOLOGIES AS DESCRIBED BY CMS-2020-01-R: HCPCS | Performed by: INTERNAL MEDICINE

## 2021-09-13 PROCEDURE — U0005 INFEC AGEN DETEC AMPLI PROBE: HCPCS | Performed by: INTERNAL MEDICINE

## 2021-09-14 LAB
SARS-COV-2 RNA RESP QL NAA+PROBE: NOT DETECTED
SARS-COV-2- CYCLE NUMBER: NORMAL

## 2021-09-16 ENCOUNTER — ANESTHESIA (OUTPATIENT)
Dept: ENDOSCOPY | Facility: HOSPITAL | Age: 56
End: 2021-09-16
Payer: COMMERCIAL

## 2021-09-16 ENCOUNTER — HOSPITAL ENCOUNTER (OUTPATIENT)
Facility: HOSPITAL | Age: 56
Discharge: HOME | End: 2021-09-16
Attending: INTERNAL MEDICINE | Admitting: INTERNAL MEDICINE
Payer: COMMERCIAL

## 2021-09-16 ENCOUNTER — ANESTHESIA EVENT (OUTPATIENT)
Dept: ENDOSCOPY | Facility: HOSPITAL | Age: 56
End: 2021-09-16
Payer: COMMERCIAL

## 2021-09-16 DIAGNOSIS — K44.9 HIATAL HERNIA: ICD-10-CM

## 2021-09-16 DIAGNOSIS — K29.70 GASTRITIS, PRESENCE OF BLEEDING UNSPECIFIED, UNSPECIFIED CHRONICITY, UNSPECIFIED GASTRITIS TYPE: Primary | ICD-10-CM

## 2021-09-16 DIAGNOSIS — K21.9 GERD (GASTROESOPHAGEAL REFLUX DISEASE): ICD-10-CM

## 2021-09-16 PROCEDURE — 27201012 HC FORCEPS, HOT/COLD, DISP: Performed by: INTERNAL MEDICINE

## 2021-09-16 PROCEDURE — D9220A PRA ANESTHESIA: Mod: ,,, | Performed by: ANESTHESIOLOGY

## 2021-09-16 PROCEDURE — D9220A PRA ANESTHESIA: ICD-10-PCS | Mod: ,,, | Performed by: NURSE ANESTHETIST, CERTIFIED REGISTERED

## 2021-09-16 PROCEDURE — 88305 TISSUE EXAM BY PATHOLOGIST: CPT | Mod: 26,,, | Performed by: PATHOLOGY

## 2021-09-16 PROCEDURE — 63600175 PHARM REV CODE 636 W HCPCS: Performed by: NURSE ANESTHETIST, CERTIFIED REGISTERED

## 2021-09-16 PROCEDURE — 43239 EGD BIOPSY SINGLE/MULTIPLE: CPT | Mod: ,,, | Performed by: INTERNAL MEDICINE

## 2021-09-16 PROCEDURE — 88305 TISSUE EXAM BY PATHOLOGIST: CPT | Performed by: PATHOLOGY

## 2021-09-16 PROCEDURE — D9220A PRA ANESTHESIA: ICD-10-PCS | Mod: ,,, | Performed by: ANESTHESIOLOGY

## 2021-09-16 PROCEDURE — 25000003 PHARM REV CODE 250: Performed by: NURSE ANESTHETIST, CERTIFIED REGISTERED

## 2021-09-16 PROCEDURE — 88305 TISSUE EXAM BY PATHOLOGIST: ICD-10-PCS | Mod: 26,,, | Performed by: PATHOLOGY

## 2021-09-16 PROCEDURE — 37000009 HC ANESTHESIA EA ADD 15 MINS: Performed by: INTERNAL MEDICINE

## 2021-09-16 PROCEDURE — 25000003 PHARM REV CODE 250: Performed by: INTERNAL MEDICINE

## 2021-09-16 PROCEDURE — D9220A PRA ANESTHESIA: Mod: ,,, | Performed by: NURSE ANESTHETIST, CERTIFIED REGISTERED

## 2021-09-16 PROCEDURE — 37000008 HC ANESTHESIA 1ST 15 MINUTES: Performed by: INTERNAL MEDICINE

## 2021-09-16 PROCEDURE — 43239 EGD BIOPSY SINGLE/MULTIPLE: CPT | Performed by: INTERNAL MEDICINE

## 2021-09-16 PROCEDURE — 43239 PR EGD, FLEX, W/BIOPSY, SGL/MULTI: ICD-10-PCS | Mod: ,,, | Performed by: INTERNAL MEDICINE

## 2021-09-16 RX ORDER — PROPOFOL 10 MG/ML
VIAL (ML) INTRAVENOUS
Status: DISCONTINUED | OUTPATIENT
Start: 2021-09-16 | End: 2021-09-16

## 2021-09-16 RX ORDER — SODIUM CHLORIDE 9 MG/ML
INJECTION, SOLUTION INTRAVENOUS CONTINUOUS
Status: DISCONTINUED | OUTPATIENT
Start: 2021-09-16 | End: 2021-09-16 | Stop reason: HOSPADM

## 2021-09-16 RX ORDER — PANTOPRAZOLE SODIUM 40 MG/1
40 TABLET, DELAYED RELEASE ORAL DAILY
Qty: 90 TABLET | Refills: 3 | Status: SHIPPED | OUTPATIENT
Start: 2021-09-16 | End: 2022-05-30

## 2021-09-16 RX ORDER — LIDOCAINE HCL/PF 100 MG/5ML
SYRINGE (ML) INTRAVENOUS
Status: DISCONTINUED | OUTPATIENT
Start: 2021-09-16 | End: 2021-09-16

## 2021-09-16 RX ADMIN — LIDOCAINE HYDROCHLORIDE 120 MG: 20 INJECTION INTRAVENOUS at 10:09

## 2021-09-16 RX ADMIN — PROPOFOL 40 MG: 10 INJECTION, EMULSION INTRAVENOUS at 10:09

## 2021-09-16 RX ADMIN — PROPOFOL 120 MG: 10 INJECTION, EMULSION INTRAVENOUS at 10:09

## 2021-09-16 RX ADMIN — SODIUM CHLORIDE: 0.9 INJECTION, SOLUTION INTRAVENOUS at 09:09

## 2021-09-17 VITALS
BODY MASS INDEX: 20.73 KG/M2 | SYSTOLIC BLOOD PRESSURE: 169 MMHG | TEMPERATURE: 98 F | RESPIRATION RATE: 16 BRPM | HEIGHT: 69 IN | OXYGEN SATURATION: 99 % | DIASTOLIC BLOOD PRESSURE: 90 MMHG | WEIGHT: 140 LBS | HEART RATE: 70 BPM

## 2021-09-20 LAB
FINAL PATHOLOGIC DIAGNOSIS: NORMAL
GROSS: NORMAL
Lab: NORMAL

## 2021-09-21 ENCOUNTER — TELEPHONE (OUTPATIENT)
Dept: GASTROENTEROLOGY | Facility: CLINIC | Age: 56
End: 2021-09-21

## 2022-05-19 ENCOUNTER — TELEPHONE (OUTPATIENT)
Dept: FAMILY MEDICINE | Facility: CLINIC | Age: 57
End: 2022-05-19

## 2022-05-19 DIAGNOSIS — Z79.899 ENCOUNTER FOR LONG-TERM (CURRENT) USE OF OTHER MEDICATIONS: Primary | ICD-10-CM

## 2022-05-19 DIAGNOSIS — E55.9 VITAMIN D DEFICIENCY: ICD-10-CM

## 2022-05-19 DIAGNOSIS — E78.2 MIXED HYPERLIPIDEMIA: ICD-10-CM

## 2022-05-19 DIAGNOSIS — Z00.00 ROUTINE GENERAL MEDICAL EXAMINATION AT A HEALTH CARE FACILITY: ICD-10-CM

## 2022-05-30 ENCOUNTER — OFFICE VISIT (OUTPATIENT)
Dept: FAMILY MEDICINE | Facility: CLINIC | Age: 57
End: 2022-05-30
Payer: COMMERCIAL

## 2022-05-30 VITALS
TEMPERATURE: 98 F | DIASTOLIC BLOOD PRESSURE: 84 MMHG | HEART RATE: 78 BPM | BODY MASS INDEX: 20.83 KG/M2 | SYSTOLIC BLOOD PRESSURE: 122 MMHG | WEIGHT: 140.63 LBS | HEIGHT: 69 IN | OXYGEN SATURATION: 99 %

## 2022-05-30 DIAGNOSIS — Z12.5 PROSTATE CANCER SCREENING: ICD-10-CM

## 2022-05-30 DIAGNOSIS — Z00.00 GENERAL MEDICAL EXAMINATION: Primary | ICD-10-CM

## 2022-05-30 DIAGNOSIS — J30.2 SEASONAL ALLERGIES: ICD-10-CM

## 2022-05-30 DIAGNOSIS — Z12.11 SPECIAL SCREENING FOR MALIGNANT NEOPLASMS, COLON: ICD-10-CM

## 2022-05-30 DIAGNOSIS — N40.1 BENIGN PROSTATIC HYPERPLASIA WITH URINARY FREQUENCY: ICD-10-CM

## 2022-05-30 DIAGNOSIS — R35.0 BENIGN PROSTATIC HYPERPLASIA WITH URINARY FREQUENCY: ICD-10-CM

## 2022-05-30 PROCEDURE — 3074F SYST BP LT 130 MM HG: CPT | Mod: CPTII,S$GLB,, | Performed by: PHYSICIAN ASSISTANT

## 2022-05-30 PROCEDURE — 3079F DIAST BP 80-89 MM HG: CPT | Mod: CPTII,S$GLB,, | Performed by: PHYSICIAN ASSISTANT

## 2022-05-30 PROCEDURE — 3079F PR MOST RECENT DIASTOLIC BLOOD PRESSURE 80-89 MM HG: ICD-10-PCS | Mod: CPTII,S$GLB,, | Performed by: PHYSICIAN ASSISTANT

## 2022-05-30 PROCEDURE — 3074F PR MOST RECENT SYSTOLIC BLOOD PRESSURE < 130 MM HG: ICD-10-PCS | Mod: CPTII,S$GLB,, | Performed by: PHYSICIAN ASSISTANT

## 2022-05-30 PROCEDURE — 99396 PREV VISIT EST AGE 40-64: CPT | Mod: S$GLB,,, | Performed by: PHYSICIAN ASSISTANT

## 2022-05-30 PROCEDURE — 99396 PR PREVENTIVE VISIT,EST,40-64: ICD-10-PCS | Mod: S$GLB,,, | Performed by: PHYSICIAN ASSISTANT

## 2022-05-30 PROCEDURE — 1159F MED LIST DOCD IN RCRD: CPT | Mod: CPTII,S$GLB,, | Performed by: PHYSICIAN ASSISTANT

## 2022-05-30 PROCEDURE — 1160F RVW MEDS BY RX/DR IN RCRD: CPT | Mod: CPTII,S$GLB,, | Performed by: PHYSICIAN ASSISTANT

## 2022-05-30 PROCEDURE — 1160F PR REVIEW ALL MEDS BY PRESCRIBER/CLIN PHARMACIST DOCUMENTED: ICD-10-PCS | Mod: CPTII,S$GLB,, | Performed by: PHYSICIAN ASSISTANT

## 2022-05-30 PROCEDURE — 3008F BODY MASS INDEX DOCD: CPT | Mod: CPTII,S$GLB,, | Performed by: PHYSICIAN ASSISTANT

## 2022-05-30 PROCEDURE — 3008F PR BODY MASS INDEX (BMI) DOCUMENTED: ICD-10-PCS | Mod: CPTII,S$GLB,, | Performed by: PHYSICIAN ASSISTANT

## 2022-05-30 PROCEDURE — 1159F PR MEDICATION LIST DOCUMENTED IN MEDICAL RECORD: ICD-10-PCS | Mod: CPTII,S$GLB,, | Performed by: PHYSICIAN ASSISTANT

## 2022-05-30 NOTE — PROGRESS NOTES
"  SUBJECTIVE:    Patient ID: Cornelius Couch is a 56 y.o. male.    Chief Complaint: Annual Exam (No RX's/ c-scope questions//dp)    Pt is a 56 y.o male with no significant PMHx who presents today for an annual exam.  Currently, he does not take any prescription medications.  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  Regarding diet, he averages 1 meal/day with healthy snacks in between that consists mostly of fruits and nuts.  His main source of exercise is walking his dog 2 blocks daily.  He quit smoking cigarettes in 2009, but does smoke recreational marijuana 2x/week. Overall, he reports feeling well, but is experiencing a sore throat that started acutely last night.  He continues to also experience generalized discomfort and urinary urgency, that has been present for the last "few years."  Currently, he does not follow-up with any urologist and does not have any UTD PSA.    Last C-scope was completed on 07/12/2021 - Dr. Leon - [facility] 1 year    No visits with results within 6 Month(s) from this visit.   Latest known visit with results is:   Admission on 09/16/2021, Discharged on 09/16/2021   Component Date Value Ref Range Status    Final Pathologic Diagnosis 09/16/2021    Final                    Value:1. Stomach, antrum and body, biopsy:  Gastric antral and oxyntic mucosa with mild reactive gastropathy.  Negative for Helicobacter pylori.      Gross 09/16/2021    Final                    Value:Container Label: Clinic Number/AP Number:  [id number], and " antrum and body "  Received in formalin are 4 soft tan tissue fragments ranging from 1-6 mm in  greatest dimension.  Specimen stained with Hematoxylin and entirely submitted  in ETF--1-SHELLY Altamirano PKiraA.      Disclaimer 09/16/2021    Final                    Value:Unless the case is a 'gross only' or additional testing only, the final  diagnosis for each specimen is based on a microscopic examination " of  appropriate tissue sections.         Past Medical History:   Diagnosis Date    Arthritis     Colon polyps     Encounter for blood transfusion     GERD (gastroesophageal reflux disease)     PONV (postoperative nausea and vomiting)      Past Surgical History:   Procedure Laterality Date    bone fusion Left 1977    ankle    COLONOSCOPY N/A 11/12/2018    Procedure: COLONOSCOPY;  Surgeon: Car Coats MD;  Location: [facility] ENDO;  Service: Endoscopy;  Laterality: N/A;    COLONOSCOPY N/A 7/12/2021    Procedure: COLONOSCOPY;  Surgeon: Car Coats MD;  Location: [facility] ENDO;  Service: Endoscopy;  Laterality: N/A;    ESOPHAGOGASTRODUODENOSCOPY N/A 9/16/2021    Procedure: EGD (ESOPHAGOGASTRODUODENOSCOPY);  Surgeon: Car Coats MD;  Location: [facility] ENDO;  Service: Endoscopy;  Laterality: N/A;    FRACTURE SURGERY      finger left pinky,     TONSILLECTOMY       Family History   Problem Relation Age of Onset    Alzheimer's disease Mother     Heart disease Father        Marital Status:   Alcohol History:  reports current alcohol use of about 1.0 standard drink of alcohol per week.  Tobacco History:  reports that he quit smoking about 13 years ago. His smoking use included cigarettes. He has a 45.00 pack-year smoking history. He has never used smokeless tobacco.  Drug History:  reports current drug use. Drug: Marijuana.    Health Maintenance Topics with due status: Not Due       Topic Last Completion Date    TETANUS VACCINE 04/18/2017    Lipid Panel 05/26/2021    Influenza Vaccine Not Due     Immunization History   Administered Date(s) Administered    Tdap 04/18/2017       Review of patient's allergies indicates:   Allergen Reactions    Codeine Itching     Anything with codeine    Adhesive Rash     No current outpatient medications on file.    Review of Systems   Constitutional: Negative for activity change, appetite change, chills, fatigue and fever.   HENT: Positive for postnasal drip and  "sore throat. Negative for congestion and rhinorrhea.    Respiratory: Negative for cough, shortness of breath and wheezing.    Cardiovascular: Negative for chest pain, palpitations and leg swelling.   Gastrointestinal: Negative for abdominal pain, constipation, diarrhea, nausea and vomiting.   Genitourinary: Positive for urgency. Negative for difficulty urinating, dysuria, frequency and hematuria.   Musculoskeletal: Negative for arthralgias and myalgias.   Skin: Negative for rash.   Neurological: Negative for dizziness, syncope, light-headedness and headaches.   Psychiatric/Behavioral: Negative for behavioral problems.          Objective:      Vitals:    05/30/22 1514   BP: 122/84   Pulse: 78   Temp: 98.1 °F (36.7 °C)   SpO2: 99%   Weight: 63.8 kg (140 lb 9.6 oz)   Height: 5' 9" (1.753 m)     Physical Exam  Vitals reviewed.   Constitutional:       General: He is not in acute distress.     Appearance: Normal appearance. He is normal weight. He is not ill-appearing, toxic-appearing or diaphoretic.   HENT:      Head: Normocephalic and atraumatic.      Right Ear: Ear canal and external ear normal. There is no impacted cerumen.      Left Ear: Tympanic membrane, ear canal and external ear normal. There is no impacted cerumen.      Ears:      Comments: Serous effusion noted behind the right TM     Nose: Nose normal. No rhinorrhea.      Mouth/Throat:      Mouth: Mucous membranes are moist.      Pharynx: Oropharynx is clear. Posterior oropharyngeal erythema present. No oropharyngeal exudate.   Eyes:      General: No scleral icterus.     Extraocular Movements: Extraocular movements intact.      Conjunctiva/sclera: Conjunctivae normal.      Pupils: Pupils are equal, round, and reactive to light.   Neck:      Vascular: No carotid bruit.   Cardiovascular:      Rate and Rhythm: Normal rate and regular rhythm.      Pulses: Normal pulses.      Heart sounds: Normal heart sounds. No murmur heard.    No friction rub.   Pulmonary:      " Effort: Pulmonary effort is normal. No respiratory distress.      Breath sounds: Normal breath sounds. No wheezing, rhonchi or rales.   Abdominal:      General: There is no distension.      Palpations: Abdomen is soft.      Tenderness: There is no abdominal tenderness. There is no guarding or rebound.   Musculoskeletal:         General: Normal range of motion.      Right lower leg: No edema.      Left lower leg: No edema.   Skin:     General: Skin is warm and dry.      Coloration: Skin is not jaundiced.   Neurological:      Mental Status: He is alert. Mental status is at baseline.      Gait: Gait normal.   Psychiatric:         Mood and Affect: Mood normal.         Behavior: Behavior normal. Behavior is cooperative.           Assessment:       1. General medical examination    2. Seasonal allergies    3. Special screening for malignant neoplasms, colon    4. Prostate cancer screening    5. Benign prostatic hyperplasia with urinary frequency    6. BMI 20.0-20.9, adult           Plan:       General medical examination  Comments:  Lab work has been ordered and to be completed when patient is fasting.    Seasonal allergies  Comments:  Suspected cause of sore throat is due to postnasal drip.  Start Zyrtec 10mg q.d. and Flonase nasal spray q.h.s..    Special screening for malignant neoplasms, colon  Comments:  Amb ref sent to Dr. Leon today for repeat colonoscopy.  Orders:  -     Ambulatory referral/consult to Gastroenterology; Future; Expected date: 05/31/2022    Prostate cancer screening  Comments:  Will obtain updated PSA on upcoming lab work.  Orders:  -     PSA, Screening; Future; Expected date: 05/30/2022    Benign prostatic hyperplasia with urinary frequency  Comments:  Pending UA and PSA results, will consider starting Flomax 0.4mg q.d..    BMI 20.0-20.9, adult    Follow up in about 6 months (around 11/30/2022) for BPH, With labs prior to visit.        5/30/2022 Gurvinder Lofton PA-C

## 2022-06-01 ENCOUNTER — TELEPHONE (OUTPATIENT)
Dept: GASTROENTEROLOGY | Facility: CLINIC | Age: 57
End: 2022-06-01
Payer: COMMERCIAL

## 2022-06-01 ENCOUNTER — TELEPHONE (OUTPATIENT)
Dept: FAMILY MEDICINE | Facility: CLINIC | Age: 57
End: 2022-06-01

## 2022-06-01 ENCOUNTER — TELEPHONE (OUTPATIENT)
Dept: FAMILY MEDICINE | Facility: CLINIC | Age: 57
End: 2022-06-01
Payer: COMMERCIAL

## 2022-06-01 DIAGNOSIS — Z86.010 ENCOUNTER FOR COLONOSCOPY DUE TO HISTORY OF ADENOMATOUS COLONIC POLYPS: Primary | ICD-10-CM

## 2022-06-01 DIAGNOSIS — Z86.010 HISTORY OF ADENOMATOUS POLYP OF COLON: ICD-10-CM

## 2022-06-01 DIAGNOSIS — Z12.11 ENCOUNTER FOR COLONOSCOPY DUE TO HISTORY OF ADENOMATOUS COLONIC POLYPS: Primary | ICD-10-CM

## 2022-06-01 DIAGNOSIS — Z01.818 PREOP TESTING: ICD-10-CM

## 2022-06-01 NOTE — TELEPHONE ENCOUNTER
Colonoscopy 7/25 at 830am arrive for 730am  instructions reviewed and patient states understanding. Copy to portal

## 2022-06-01 NOTE — TELEPHONE ENCOUNTER
----- Message from Lilia Littlejohn sent at 6/1/2022  2:58 PM CDT -----  Contact: patient  Type: Needs Medical Advice  Who Called:  patient  Best Call Back Number: [phone] (home)   Additional Information: patient is requesting a call back regarding a colonoscopy and his insurance. Please advise.

## 2022-06-01 NOTE — TELEPHONE ENCOUNTER
Spoke to pt. States this message was supposed to go to Dr. Coats's nurse. Not his pcp. Informed pt I will sent this message to Dr. Coats's office.

## 2022-06-01 NOTE — TELEPHONE ENCOUNTER
----- Message from Ewelina Coleman sent at 6/1/2022  2:39 PM CDT -----  Contact: pt [phone]  Stated that his insurance only covers colonoscopy every 10 years. Is there any medically necessary forms that can get it approved sooner.    Please call and advise.    Thank You

## 2022-06-03 ENCOUNTER — TELEPHONE (OUTPATIENT)
Dept: GASTROENTEROLOGY | Facility: CLINIC | Age: 57
End: 2022-06-03
Payer: COMMERCIAL

## 2022-06-03 LAB
25(OH)D3 SERPL-MCNC: 30 NG/ML (ref 30–100)
ALBUMIN SERPL-MCNC: 4 G/DL (ref 3.6–5.1)
ALBUMIN/GLOB SERPL: 1.7 (CALC) (ref 1–2.5)
ALP SERPL-CCNC: 51 U/L (ref 35–144)
ALT SERPL-CCNC: 12 U/L (ref 9–46)
APPEARANCE UR: CLEAR
AST SERPL-CCNC: 14 U/L (ref 10–35)
BACTERIA #/AREA URNS HPF: NORMAL /HPF
BACTERIA UR CULT: NORMAL
BASOPHILS # BLD AUTO: 61 CELLS/UL (ref 0–200)
BASOPHILS NFR BLD AUTO: 1.6 %
BILIRUB SERPL-MCNC: 0.3 MG/DL (ref 0.2–1.2)
BILIRUB UR QL STRIP: NEGATIVE
BUN SERPL-MCNC: 12 MG/DL (ref 7–25)
BUN/CREAT SERPL: NORMAL (CALC) (ref 6–22)
CALCIUM SERPL-MCNC: 8.9 MG/DL (ref 8.6–10.3)
CHLORIDE SERPL-SCNC: 107 MMOL/L (ref 98–110)
CHOLEST SERPL-MCNC: 208 MG/DL
CHOLEST/HDLC SERPL: 4 (CALC)
CO2 SERPL-SCNC: 27 MMOL/L (ref 20–32)
COLOR UR: YELLOW
CREAT SERPL-MCNC: 0.95 MG/DL (ref 0.7–1.33)
EOSINOPHIL # BLD AUTO: 160 CELLS/UL (ref 15–500)
EOSINOPHIL NFR BLD AUTO: 4.2 %
ERYTHROCYTE [DISTWIDTH] IN BLOOD BY AUTOMATED COUNT: 12.6 % (ref 11–15)
GLOBULIN SER CALC-MCNC: 2.4 G/DL (CALC) (ref 1.9–3.7)
GLUCOSE SERPL-MCNC: 95 MG/DL (ref 65–99)
GLUCOSE UR QL STRIP: NEGATIVE
HCT VFR BLD AUTO: 44.4 % (ref 38.5–50)
HDLC SERPL-MCNC: 52 MG/DL
HGB BLD-MCNC: 14.6 G/DL (ref 13.2–17.1)
HGB UR QL STRIP: NEGATIVE
HYALINE CASTS #/AREA URNS LPF: NORMAL /LPF
KETONES UR QL STRIP: NEGATIVE
LDLC SERPL CALC-MCNC: 136 MG/DL (CALC)
LEUKOCYTE ESTERASE UR QL STRIP: NEGATIVE
LYMPHOCYTES # BLD AUTO: 1170 CELLS/UL (ref 850–3900)
LYMPHOCYTES NFR BLD AUTO: 30.8 %
MCH RBC QN AUTO: 31.1 PG (ref 27–33)
MCHC RBC AUTO-ENTMCNC: 32.9 G/DL (ref 32–36)
MCV RBC AUTO: 94.7 FL (ref 80–100)
MONOCYTES # BLD AUTO: 475 CELLS/UL (ref 200–950)
MONOCYTES NFR BLD AUTO: 12.5 %
NEUTROPHILS # BLD AUTO: 1934 CELLS/UL (ref 1500–7800)
NEUTROPHILS NFR BLD AUTO: 50.9 %
NITRITE UR QL STRIP: NEGATIVE
NONHDLC SERPL-MCNC: 156 MG/DL (CALC)
PH UR STRIP: 6 [PH] (ref 5–8)
PLATELET # BLD AUTO: 241 THOUSAND/UL (ref 140–400)
PMV BLD REES-ECKER: 10 FL (ref 7.5–12.5)
POTASSIUM SERPL-SCNC: 4.4 MMOL/L (ref 3.5–5.3)
PROT SERPL-MCNC: 6.4 G/DL (ref 6.1–8.1)
PROT UR QL STRIP: NEGATIVE
PSA SERPL-MCNC: 0.63 NG/ML
RBC # BLD AUTO: 4.69 MILLION/UL (ref 4.2–5.8)
RBC #/AREA URNS HPF: NORMAL /HPF
SODIUM SERPL-SCNC: 142 MMOL/L (ref 135–146)
SP GR UR STRIP: 1.02 (ref 1–1.03)
SQUAMOUS #/AREA URNS HPF: NORMAL /HPF
TRIGL SERPL-MCNC: 94 MG/DL
TSH SERPL-ACNC: 1.8 MIU/L (ref 0.4–4.5)
WBC # BLD AUTO: 3.8 THOUSAND/UL (ref 3.8–10.8)
WBC #/AREA URNS HPF: NORMAL /HPF

## 2022-06-03 NOTE — TELEPHONE ENCOUNTER
6/2/2022 patient left message. Returned call without answer/voicemail. Portal message to contact back for questions/concerns.

## 2022-06-06 ENCOUNTER — TELEPHONE (OUTPATIENT)
Dept: FAMILY MEDICINE | Facility: CLINIC | Age: 57
End: 2022-06-06

## 2022-06-06 DIAGNOSIS — N40.0 BENIGN PROSTATIC HYPERPLASIA WITHOUT LOWER URINARY TRACT SYMPTOMS: Primary | ICD-10-CM

## 2022-06-06 RX ORDER — TAMSULOSIN HYDROCHLORIDE 0.4 MG/1
0.4 CAPSULE ORAL DAILY
Qty: 90 CAPSULE | Refills: 1 | Status: SHIPPED | OUTPATIENT
Start: 2022-06-06 | End: 2023-06-15

## 2022-06-06 NOTE — TELEPHONE ENCOUNTER
Spoke with pt in regards to recent blood work.  Verbalized per Gurvinder, overall, lab work looks excellent.  No signs of anemia noted.  Liver, kidney, and thyroid function are well within normal limits.  Cholesterol levels are well controlled.  Vitamin-D level is also within normal limits.  Urinalysis is negative for any signs of infection, spilled protein, glucose, or blood.  PSA is excellent at 0.63.  Being that urinary urgency is present, I recommend starting Flomax 0.4mg q.d..  If patient is amenable, please verify pharmacy and set up this prescription to be sent. Pt acknowledge understanding. Pt is willing to start the Flomax.

## 2022-06-06 NOTE — TELEPHONE ENCOUNTER
----- Message from FEDE Erickson sent at 6/3/2022  1:55 PM CDT -----  Please contact patient and inform him that his lab work was reviewed.  Overall, lab work looks excellent.  No signs of anemia noted.  Liver, kidney, and thyroid function are well within normal limits.  Cholesterol levels are well controlled.  Vitamin-D level is also within normal limits.  Urinalysis is negative for any signs of infection, spilled protein, glucose, or blood.  PSA is excellent at 0.63.  Being that urinary urgency is present, I recommend starting Flomax 0.4mg q.d..  If patient is amenable, please verify pharmacy and set up this prescription to be sent.  Thanks.

## 2022-07-22 ENCOUNTER — LAB VISIT (OUTPATIENT)
Dept: PRIMARY CARE CLINIC | Facility: CLINIC | Age: 57
End: 2022-07-22
Payer: COMMERCIAL

## 2022-07-22 DIAGNOSIS — Z01.818 PREOP TESTING: ICD-10-CM

## 2022-07-22 PROCEDURE — U0005 INFEC AGEN DETEC AMPLI PROBE: HCPCS | Performed by: INTERNAL MEDICINE

## 2022-07-22 PROCEDURE — U0003 INFECTIOUS AGENT DETECTION BY NUCLEIC ACID (DNA OR RNA); SEVERE ACUTE RESPIRATORY SYNDROME CORONAVIRUS 2 (SARS-COV-2) (CORONAVIRUS DISEASE [COVID-19]), AMPLIFIED PROBE TECHNIQUE, MAKING USE OF HIGH THROUGHPUT TECHNOLOGIES AS DESCRIBED BY CMS-2020-01-R: HCPCS | Performed by: INTERNAL MEDICINE

## 2022-07-23 LAB
SARS-COV-2 RNA RESP QL NAA+PROBE: NOT DETECTED
SARS-COV-2- CYCLE NUMBER: NORMAL

## 2022-07-25 ENCOUNTER — ANESTHESIA EVENT (OUTPATIENT)
Dept: ENDOSCOPY | Facility: HOSPITAL | Age: 57
End: 2022-07-25
Payer: COMMERCIAL

## 2022-07-25 ENCOUNTER — HOSPITAL ENCOUNTER (OUTPATIENT)
Facility: HOSPITAL | Age: 57
Discharge: HOME | End: 2022-07-25
Attending: INTERNAL MEDICINE | Admitting: INTERNAL MEDICINE
Payer: COMMERCIAL

## 2022-07-25 ENCOUNTER — ANESTHESIA (OUTPATIENT)
Dept: ENDOSCOPY | Facility: HOSPITAL | Age: 57
End: 2022-07-25
Payer: COMMERCIAL

## 2022-07-25 DIAGNOSIS — K63.5 POLYP OF COLON, UNSPECIFIED PART OF COLON, UNSPECIFIED TYPE: Primary | ICD-10-CM

## 2022-07-25 DIAGNOSIS — Z86.010 HX OF COLONIC POLYPS: ICD-10-CM

## 2022-07-25 DIAGNOSIS — K64.8 INTERNAL HEMORRHOIDS: ICD-10-CM

## 2022-07-25 PROCEDURE — 37000009 HC ANESTHESIA EA ADD 15 MINS: Performed by: INTERNAL MEDICINE

## 2022-07-25 PROCEDURE — 63600175 PHARM REV CODE 636 W HCPCS: Performed by: NURSE ANESTHETIST, CERTIFIED REGISTERED

## 2022-07-25 PROCEDURE — 88305 TISSUE EXAM BY PATHOLOGIST: ICD-10-PCS | Mod: 26,,, | Performed by: STUDENT IN AN ORGANIZED HEALTH CARE EDUCATION/TRAINING PROGRAM

## 2022-07-25 PROCEDURE — 45380 PR COLONOSCOPY,BIOPSY: ICD-10-PCS | Mod: 33,59,, | Performed by: INTERNAL MEDICINE

## 2022-07-25 PROCEDURE — 27201012 HC FORCEPS, HOT/COLD, DISP: Performed by: INTERNAL MEDICINE

## 2022-07-25 PROCEDURE — 27201089 HC SNARE, DISP (ANY): Performed by: INTERNAL MEDICINE

## 2022-07-25 PROCEDURE — D9220A PRA ANESTHESIA: ICD-10-PCS | Mod: 33,CRNA,, | Performed by: NURSE ANESTHETIST, CERTIFIED REGISTERED

## 2022-07-25 PROCEDURE — 25000003 PHARM REV CODE 250: Performed by: INTERNAL MEDICINE

## 2022-07-25 PROCEDURE — 45385 COLONOSCOPY W/LESION REMOVAL: CPT | Mod: PT | Performed by: INTERNAL MEDICINE

## 2022-07-25 PROCEDURE — D9220A PRA ANESTHESIA: Mod: 33,CRNA,, | Performed by: NURSE ANESTHETIST, CERTIFIED REGISTERED

## 2022-07-25 PROCEDURE — 45380 COLONOSCOPY AND BIOPSY: CPT | Mod: PT,59 | Performed by: INTERNAL MEDICINE

## 2022-07-25 PROCEDURE — 37000008 HC ANESTHESIA 1ST 15 MINUTES: Performed by: INTERNAL MEDICINE

## 2022-07-25 PROCEDURE — 45385 PR COLONOSCOPY,REMV LESN,SNARE: ICD-10-PCS | Mod: 33,,, | Performed by: INTERNAL MEDICINE

## 2022-07-25 PROCEDURE — 88305 TISSUE EXAM BY PATHOLOGIST: CPT | Mod: 59 | Performed by: STUDENT IN AN ORGANIZED HEALTH CARE EDUCATION/TRAINING PROGRAM

## 2022-07-25 PROCEDURE — 45385 COLONOSCOPY W/LESION REMOVAL: CPT | Mod: 33,,, | Performed by: INTERNAL MEDICINE

## 2022-07-25 PROCEDURE — D9220A PRA ANESTHESIA: Mod: 33,ANES,, | Performed by: ANESTHESIOLOGY

## 2022-07-25 PROCEDURE — 25000003 PHARM REV CODE 250: Performed by: NURSE ANESTHETIST, CERTIFIED REGISTERED

## 2022-07-25 PROCEDURE — D9220A PRA ANESTHESIA: ICD-10-PCS | Mod: 33,ANES,, | Performed by: ANESTHESIOLOGY

## 2022-07-25 PROCEDURE — 88305 TISSUE EXAM BY PATHOLOGIST: CPT | Mod: 26,,, | Performed by: STUDENT IN AN ORGANIZED HEALTH CARE EDUCATION/TRAINING PROGRAM

## 2022-07-25 PROCEDURE — 45380 COLONOSCOPY AND BIOPSY: CPT | Mod: 33,59,, | Performed by: INTERNAL MEDICINE

## 2022-07-25 RX ORDER — PROPOFOL 10 MG/ML
VIAL (ML) INTRAVENOUS
Status: DISCONTINUED | OUTPATIENT
Start: 2022-07-25 | End: 2022-07-25

## 2022-07-25 RX ORDER — SODIUM CHLORIDE 9 MG/ML
INJECTION, SOLUTION INTRAVENOUS CONTINUOUS
Status: DISCONTINUED | OUTPATIENT
Start: 2022-07-25 | End: 2022-07-25 | Stop reason: HOSPADM

## 2022-07-25 RX ORDER — LIDOCAINE HYDROCHLORIDE 20 MG/ML
INJECTION INTRAVENOUS
Status: DISCONTINUED | OUTPATIENT
Start: 2022-07-25 | End: 2022-07-25

## 2022-07-25 RX ADMIN — SODIUM CHLORIDE: 0.9 INJECTION, SOLUTION INTRAVENOUS at 08:07

## 2022-07-25 RX ADMIN — PROPOFOL 50 MG: 10 INJECTION, EMULSION INTRAVENOUS at 09:07

## 2022-07-25 RX ADMIN — LIDOCAINE HYDROCHLORIDE 50 MG: 20 INJECTION, SOLUTION INTRAVENOUS at 08:07

## 2022-07-25 RX ADMIN — PROPOFOL 50 MG: 10 INJECTION, EMULSION INTRAVENOUS at 08:07

## 2022-07-25 RX ADMIN — PROPOFOL 100 MG: 10 INJECTION, EMULSION INTRAVENOUS at 08:07

## 2022-07-25 NOTE — PLAN OF CARE
Patient awake, alert, and oriented.  No complaints of pain; abdomen soft and tender.  Patient passing flatus without difficulty.  Vital signs stable.  Patient tolerated po fluids well.  Dr. Coats spoke with patient and family member prior to discharge.  Patient and family verbalize understanding of all discharge instructions given.  Patient discharged to home accompanied by family

## 2022-07-25 NOTE — H&P
CC: History of colon polyps - last scope 2021    56 year old male with above. States that symptoms are absent, no alleviating/exacerbating factors. No family history of colorectal CA. Positive personal history of polyps. No bleeding or weight loss.     ROS:  No headache, no fever/chills, no chest pain/SOB, no nausea/vomiting/diarrhea/constipation/GI bleeding/abdominal pain, no dysuria/hematuria.    VSSAF   Exam:   Alert and oriented x 3; no apparent distress   PERRLA, sclera anicteric  CV: Regular rate/rhythm, normal PMI   Lungs: Clear bilaterally with no wheeze/rales   Abdomen: Soft, NT/ND, normal bowel sounds   Ext: No cyanosis, clubbing     Impression:   As above    Plan:   Proceed with endoscopy. Further recs to follow.

## 2022-07-25 NOTE — PROVATION PATIENT INSTRUCTIONS
Discharge Summary/Instructions after an Endoscopic Procedure  Patient Name: Cornelius Couch  Patient MRN: [id number]  Patient YOB: 1965  Monday, July 25, 2022  Car Leon MD  Dear patient,  As a result of recent federal legislation (The Federal Cures Act), you may   receive lab or pathology results from your procedure in your [facility]   account before your physician is able to contact you. Your physician or   their representative will relay the results to you with their   recommendations at their soonest availability.  Thank you,  RESTRICTIONS:  During your procedure today, you received medications for sedation.  These   medications may affect your judgment, balance and coordination.  Therefore,   for 24 hours, you have the following restrictions:   - DO NOT drive a car, operate machinery, make legal/financial decisions,   sign important papers or drink alcohol.    ACTIVITY:  Today: no heavy lifting, straining or running due to procedural   sedation/anesthesia.  The following day: return to full activity including work.  DIET:  Eat and drink normally unless instructed otherwise.     TREATMENT FOR COMMON SIDE EFFECTS:  - Mild abdominal pain, nausea, belching, bloating or excessive gas:  rest,   eat lightly and use a heating pad.  - Sore Throat: treat with throat lozenges and/or gargle with warm salt   water.  - Because air was used during the procedure, expelling large amounts of air   from your rectum or belching is normal.  - If a bowel prep was taken, you may not have a bowel movement for 1-3 days.    This is normal.  SYMPTOMS TO WATCH FOR AND REPORT TO YOUR PHYSICIAN:  1. Abdominal pain or bloating, other than gas cramps.  2. Chest pain.  3. Back pain.  4. Signs of infection such as: chills or fever occurring within 24 hours   after the procedure.  5. Rectal bleeding, which would show as bright red, maroon, or black stools.   (A tablespoon of blood from the rectum is not serious, especially if    hemorrhoids are present.)  6. Vomiting.  7. Weakness or dizziness.  GO DIRECTLY TO THE NEAREST EMERGENCY ROOM IF YOU HAVE ANY OF THE FOLLOWING:      Difficulty breathing              Chills and/or fever over 101 F   Persistent vomiting and/or vomiting blood   Severe abdominal pain   Severe chest pain   Black, tarry stools   Bleeding- more than one tablespoon   Any other symptom or condition that you feel may need urgent attention  Your doctor recommends these additional instructions:  If any biopsies were taken, your doctors clinic will contact you in 1 to 2   weeks with any results.  - Patient has a contact number available for emergencies.  The signs and   symptoms of potential delayed complications were discussed with the   patient.  Return to normal activities tomorrow.  Written discharge   instructions were provided to the patient.   - High fiber diet.   - Continue present medications.   - Await pathology results.   - Repeat colonoscopy in 3 years for surveillance.   - Discharge patient to home (ambulatory).   - Return to my office PRN.  For questions, problems or results please call your physician - Car Leon MD at Work:  [phone].  [facility], EMERGENCY ROOM PHONE NUMBER: [phone]  IF A COMPLICATION OR EMERGENCY SITUATION ARISES AND YOU ARE UNABLE TO REACH   YOUR PHYSICIAN - GO DIRECTLY TO THE EMERGENCY ROOM.  Car Leon MD  7/25/2022 9:32:32 AM  This report has been verified and signed electronically.  Dear patient,  As a result of recent federal legislation (The Federal Cures Act), you may   receive lab or pathology results from your procedure in your [facility]   account before your physician is able to contact you. Your physician or   their representative will relay the results to you with their   recommendations at their soonest availability.  Thank you,  PROVATION

## 2022-07-25 NOTE — DISCHARGE INSTRUCTIONS
"Discharge Instructions: After Your Surgery/Procedure  Youve just had surgery. During surgery you were given medicine called anesthesia to keep you relaxed and free of pain. After surgery you may have some pain or nausea. This is common. Here are some tips for feeling better and getting well after surgery.     Stay on schedule with your medication.   Going home  Your doctor or nurse will show you how to take care of yourself when you go home. He or she will also answer your questions. Have an adult family member or friend drive you home.      For your safety we recommend these precaution for the first 24 hours after your procedure:  Do not drive or use heavy equipment.  Do not make important decisions or sign legal papers.  Do not drink alcohol.  Have someone stay with you, if needed. He or she can watch for problems and help keep you safe.  Your concentration, balance, coordination, and judgement may be impaired for many hours after anesthesia.  Use caution when ambulating or standing up.     You may feel weak and "washed out" after anesthesia and surgery.      Subtle residual effects of general anesthesia or sedation with regional / local anesthesia can last more than 24 hours.  Rest for the remainder of the day or longer if your Doctor/Surgeon has advised you to do so.  Although you may feel normal within the first 24 hours, your reflexes and mental ability may be impaired without you realizing it.  You may feel dizzy, lightheaded or sleepy for 24 hours or longer.      Be sure to go to all follow-up visits with your doctor. And rest after your surgery for as long as your doctor tells you to.  Coping with pain  If you have pain after surgery, pain medicine will help you feel better. Take it as told, before pain becomes severe. Also, ask your doctor or pharmacist about other ways to control pain. This might be with heat, ice, or relaxation. And follow any other instructions your surgeon or nurse gives you.  Tips " for taking pain medicine  To get the best relief possible, remember these points:  Pain medicines can upset your stomach. Taking them with a little food may help.  Most pain relievers taken by mouth need at least 20 to 30 minutes to start to work.  Taking medicine on a schedule can help you remember to take it. Try to time your medicine so that you can take it before starting an activity. This might be before you get dressed, go for a walk, or sit down for dinner.  Constipation is a common side effect of pain medicines. Call your doctor before taking any medicines such as laxatives or stool softeners to help ease constipation. Also ask if you should skip any foods. Drinking lots of fluids and eating foods such as fruits and vegetables that are high in fiber can also help. Remember, do not take laxatives unless your surgeon has prescribed them.  Drinking alcohol and taking pain medicine can cause dizziness and slow your breathing. It can even be deadly. Do not drink alcohol while taking pain medicine.  Pain medicine can make you react more slowly to things. Do not drive or run machinery while taking pain medicine.  Your health care provider may tell you to take acetaminophen to help ease your pain. Ask him or her how much you are supposed to take each day. Acetaminophen or other pain relievers may interact with your prescription medicines or other over-the-counter (OTC) drugs. Some prescription medicines have acetaminophen and other ingredients. Using both prescription and OTC acetaminophen for pain can cause you to overdose. Read the labels on your OTC medicines with care. This will help you to clearly know the list of ingredients, how much to take, and any warnings. It may also help you not take too much acetaminophen. If you have questions or do not understand the information, ask your pharmacist or health care provider to explain it to you before you take the OTC medicine.  Managing nausea  Some people have an  upset stomach after surgery. This is often because of anesthesia, pain, or pain medicine, or the stress of surgery. These tips will help you handle nausea and eat healthy foods as you get better. If you were on a special food plan before surgery, ask your doctor if you should follow it while you get better. These tips may help:  Do not push yourself to eat. Your body will tell you when to eat and how much.  Start off with clear liquids and soup. They are easier to digest.  Next try semi-solid foods, such as mashed potatoes, applesauce, and gelatin, as you feel ready.  Slowly move to solid foods. Dont eat fatty, rich, or spicy foods at first.  Do not force yourself to have 3 large meals a day. Instead eat smaller amounts more often.  Take pain medicines with a small amount of solid food, such as crackers or toast, to avoid nausea.     Call your surgeon if  You still have pain an hour after taking medicine. The medicine may not be strong enough.  You feel too sleepy, dizzy, or groggy. The medicine may be too strong.  You have side effects like nausea, vomiting, or skin changes, such as rash, itching, or hives.       If you have obstructive sleep apnea  You were given anesthesia medicine during surgery to keep you comfortable and free of pain. After surgery, you may have more apnea spells because of this medicine and other medicines you were given. The spells may last longer than usual.   At home:  Keep using the continuous positive airway pressure (CPAP) device when you sleep. Unless your health care provider tells you not to, use it when you sleep, day or night. CPAP is a common device used to treat obstructive sleep apnea.  Talk with your provider before taking any pain medicine, muscle relaxants, or sedatives. Your provider will tell you about the possible dangers of taking these medicines.  © 2817-9001 [employer]. [address] [place] [address], [place] [address]. All rights reserved. This information is  not intended as a substitute for professional medical care. Always follow your healthcare professional's instructions. Hemorrhoids   The Basics   Written by the doctors and editors at [facility]   What are hemorrhoids? -- Hemorrhoids are swollen veins in the rectum. They can cause itching, bleeding, and pain. Hemorrhoids are very common.  In some cases, you can see or feel hemorrhoids around the outside of the rectum. In other cases, you cannot see them because they are hidden inside the rectum (figure 1).  What are the symptoms of hemorrhoids? -- Hemorrhoids do not always cause symptoms. But when they do, symptoms can include:  Itching of the skin around the anus  Bleeding - Bleeding is usually painless. You might see bright red blood after using the toilet.  Pain - If a blood clot forms inside a hemorrhoid, this can cause pain. It can also cause a lump that you might be able to feel.  Should I see a doctor or nurse? -- You should see a doctor or nurse if you have any bleeding or if your bowel movements look like tar. Bleeding could be caused by something other than hemorrhoids, so you should have it checked out.  If you do have hemorrhoids, your doctor or nurse can suggest treatments. But there some steps you can try on you your own first.  What can I do to keep from getting more hemorrhoids? -- The most important thing you can do is to keep from getting constipated. You should have a bowel movement at least a few times a week. When you have a bowel movement, you also should not have to push too much. Plus, your bowel movements should not be too hard.  Being constipated and having hard bowel movements can make hemorrhoids worse. Here are some steps you can take to avoid getting constipated or having hard stools:  Eat lots of fruits, vegetables, and other foods with fiber (figure 2). Fiber helps to increase bowel movements.   You need 20 to 35 grams of fiber a day to keep your bowel movements regular (table 1). If  "you do not get enough fiber from your diet, you can take fiber supplements. These come in the form of powders, wafers, or pills. They include psyllium seed (sample brand names: Metamucil, Konsyl), methylcellulose (sample brand name: Citrucel), polycarbophil (sample brand name: FiberCon), and wheat dextrin (sample brand name: Benefiber). If you take a fiber supplement, be sure to read the label so you know how much to take. If you're not sure, ask your doctor nurse.  Take medicines called "stool softeners" such as docusate sodium (sample brand names: Colace, Dulcolax). These medicines increase the number of bowel movements you have. They are safe to take and they can prevent problems later.  What can I do to reduce my symptoms? -- Some people feel better if they soak their buttocks in 2 or 3 inches of warm water. You can do this up to 2 to 3 times a day for 10 to 15 minutes. Do not add soap, bubble bath, or anything to the water.  There are also medicines that you can get without a prescription (table 2). They are usually creams or ointments that you rub on your anus to relieve pain, itching, and swelling. Some hemorrhoid medicines come in a capsule (called a suppository) that you put inside your rectum. Others come in a cream that comes in a bottle with a nozzle that you put inside your rectum. It is OK to try these medicines. But do not use medicines that have hydrocortisone (a steroid medicine) for more than a week, unless your doctor or nurse approves.  What if the self-care steps do not work? -- If you still have symptoms after trying the steps listed above, you might need treatments to destroy or remove the hemorrhoids.  One popular treatment for hemorrhoids inside the rectum is called "rubber band ligation." For this treatment, the doctor ties tiny rubber bands around the hemorrhoids. A few days later the hemorrhoids shrink and fall off. Doctors can also use lasers, heat, or chemicals to destroy hemorrhoids. " "But if none of these options works, your doctor might suggest surgery to remove the hemorrhoids. Hemorrhoids on the outside of the rectum can only be removed with surgery.  All topics are updated as new evidence becomes available and our peer review process is complete.  This topic retrieved from [employer] on: Sep 21, 2021.  Topic 32860 Version 13.0  Release: 29.4.2 - C29.263  © 2021 [employer] Inc[employer] and/or its affiliates. All rights reserved.  figure 1: Hemorrhoids     Hemorrhoids that are hidden inside the rectum are called "internal" hemorrhoids. You cannot see them, but they can cause symptoms. Hemorrhoids that you can see or feel are called "external" hemorrhoids.  Graphic 30638 Version 2.0    figure 2: Foods with fiber    High Fiber Diet   About this topic   Dietary fiber helps many illnesses. It can help you if you cannot have a bowel movement or if you have loose stools. Fiber can also lower your risk of diabetes and heart disease. Fiber can help with weight loss by helping you feel saini after meals.  You can find fiber in fruits, vegetables, nuts and seeds, whole grains, and legumes. The fiber is the part of the plant food that your body cannot break down and absorb. It passes through your stomach, small bowel, colon, and out your body.  There are two kinds of fiber: Insoluble and soluble fiber. Insoluble fiber helps you pass foods through your digestive system. Insoluble fiber can help you with hard stools. Soluble fiber draws water in and turns it into a gel-like form making digestion slow down. Both are important.       What will the results be?   A high fiber diet can help you with bowel problems like stools that are too hard or too loose. It can also help prevent hemorrhoids and other colon problems. A high fiber diet can also help control your weight and lower blood sugar and cholesterol levels.  What changes to diet are needed?   The amount of fiber you need is based on your age, gender, and " health.  Try to get 20 to 35 grams of fiber in your diet each day. Most people in the [place] only eat 15 grams of fiber daily.  Drink at least 8 cups (1920 mL) of fluid each day.  When is this diet used?   Your doctor may talk with you about this diet if you have belly problems.  Who should use this diet?   Older children, young people, and adults can have this diet.   Who should not use this diet?   Some people should not use this diet. Check with your doctor if you have:  Diverticulitis  Active Crohn's disease  Ulcerative colitis  Bowel inflammation  Certain types of GI surgery  Talk to your child's doctor before starting your child on a high fiber diet.  What foods are good to eat?   To get the most from fiber in your diet, eat a wide variety of high fiber foods. Some examples are:  Vegetables like:  Spinach  Peas  Artichoke  Sweet potatoes with skin  Broccoli  Fruits like:  Raspberries  Blueberries  Blackberries  Apples with skin  Dried fruits  Grains like:  Oat bran  Barley  Whole wheat products  Wheat bran  Dried beans and nuts like:  Sunflower seeds  Almonds  Black beans  Chickpeas  What problems could happen?   Sudden increase of fiber intake can lead to gas, pain, fullness in your belly, and loose stools. Increase fiber gradually while drinking plenty of fluids.  Do not eat too much fiber. Your body will not take in vitamins and minerals as well if you eat too much fiber.  When do I need to call the doctor?   Health problem is not better or you are feeling worse.  Helpful tips   Start slow as you add more fiber to your diet. This may help prevent gas or cramps.  Try to eat the same amount of fiber each day. Aim to get your fiber from nutritious foods. Supplements do not offer the same benefits as food.  Read food labels with care to learn how much fiber is in the food you are eating.  If possible, do not peel fruits or vegetables before you eat them. Eating the peel gives you more fiber.  Where can I learn  more?   Eat Right  https://www.eatright.org/food/vitamins-and-supplements/types-of-vitamins-and-nutrients/easy-ways-to-boost-fiber-in-your-daily-diet   Last Reviewed Date   2021-09-23  Consumer Information Use and Disclaimer   This information is not specific medical advice and does not replace information you receive from your health care provider. This is only a brief summary of general information. It does NOT include all information about conditions, illnesses, injuries, tests, procedures, treatments, therapies, discharge instructions or life-style choices that may apply to you. You must talk with your health care provider for complete information about your health and treatment options. This information should not be used to decide whether or not to accept your health care providers advice, instructions or recommendations. Only your health care provider has the knowledge and training to provide advice that is right for you.  Copyright   Copyright © 2021 [employer] and its affiliates and/or licensors. All rights reserved.

## 2022-07-25 NOTE — ANESTHESIA PREPROCEDURE EVALUATION
07/25/2022  Cornelius Couch is a 56 y.o., male.      Pre-op Assessment    I have reviewed the Patient Summary Reports.     I have reviewed the Nursing Notes. I have reviewed the NPO Status.   I have reviewed the Medications.     Review of Systems  Anesthesia Hx:  Denies Family Hx of Anesthesia complications.  Personal Hx of Anesthesia complications, Post-Operative Nausea/Vomiting, in the past, but not with recent anesthetics / prophylaxis   Social:  Smoker    Renal/:   BPH    Hepatic/GI:   Bowel Prep. GERD        Physical Exam  General: Well nourished, Cooperative, Alert and Oriented    Airway:  Mallampati: II   Mouth Opening: Normal  TM Distance: Normal  Tongue: Normal  Neck ROM: Normal ROM    Chest/Lungs:  Normal Respiratory Rate    Heart:  Rate: Normal  Rhythm: Regular Rhythm        Anesthesia Plan  Type of Anesthesia, risks & benefits discussed:    Anesthesia Type: Gen Natural Airway  Intra-op Monitoring Plan: Standard ASA Monitors  Induction:  IV  Informed Consent: Informed consent signed with the Patient and all parties understand the risks and agree with anesthesia plan.  All questions answered.   ASA Score: 2    Ready For Surgery From Anesthesia Perspective.     .

## 2022-07-25 NOTE — TRANSFER OF CARE
"Anesthesia Transfer of Care Note    Patient: Cornelius Couch    Procedure(s) Performed: Procedure(s) (LRB):  COLONOSCOPY (N/A)    Patient location: PACU    Anesthesia Type: general    Transport from OR: Transported from OR on 2-3 L/min O2 by NC with adequate spontaneous ventilation    Post pain: adequate analgesia    Post assessment: no apparent anesthetic complications and tolerated procedure well    Post vital signs: stable    Level of consciousness: sedated    Nausea/Vomiting: no nausea/vomiting    Complications: none    Transfer of care protocol was followed      Last vitals:   Visit Vitals  /77   Pulse 68   Temp 36.9 °C (98.4 °F) (Skin)   Resp 16   Ht 5' 9" (1.753 m)   Wt 63.5 kg (140 lb)   SpO2 98%   BMI 20.67 kg/m²     "

## 2022-07-25 NOTE — ANESTHESIA POSTPROCEDURE EVALUATION
Anesthesia Post Evaluation    Patient: Cornelius Couch    Procedure(s) Performed: Procedure(s) (LRB):  COLONOSCOPY (N/A)    Final Anesthesia Type: general      Patient location during evaluation: PACU  Patient participation: Yes- Able to Participate  Level of consciousness: awake and alert  Post-procedure vital signs: reviewed and stable  Pain management: adequate  Airway patency: patent    PONV status at discharge: No PONV  Anesthetic complications: no      Cardiovascular status: blood pressure returned to baseline  Respiratory status: unassisted  Hydration status: euvolemic  Follow-up not needed.          Vitals Value Taken Time   /78 07/25/22 1010   Temp 36.4 °C (97.5 °F) 07/25/22 0940   Pulse 76 07/25/22 1010   Resp 18 07/25/22 1010   SpO2 100 % 07/25/22 1010         Event Time   Out of Recovery 10:17:40         Pain/Donnie Score: Donnie Score: 10 (7/25/2022 10:10 AM)

## 2022-07-26 VITALS
HEIGHT: 69 IN | RESPIRATION RATE: 18 BRPM | TEMPERATURE: 98 F | WEIGHT: 140 LBS | SYSTOLIC BLOOD PRESSURE: 160 MMHG | OXYGEN SATURATION: 100 % | DIASTOLIC BLOOD PRESSURE: 78 MMHG | HEART RATE: 76 BPM | BODY MASS INDEX: 20.73 KG/M2

## 2022-07-29 LAB
FINAL PATHOLOGIC DIAGNOSIS: NORMAL
GROSS: NORMAL
Lab: NORMAL

## 2022-07-29 NOTE — PROGRESS NOTES
Please advise patient that polyp pathology was reviewed and is benign and is the adenomatous, sessile serrated type which is precancerous/risk factor for cancer.  Repeat colonoscopy recommended in 3 years.  Place reminder in system for repeat colonoscopy.

## 2022-08-03 ENCOUNTER — HOSPITAL ENCOUNTER (EMERGENCY)
Facility: HOSPITAL | Age: 57
Discharge: HOME | End: 2022-08-03
Attending: EMERGENCY MEDICINE
Payer: COMMERCIAL

## 2022-08-03 VITALS
WEIGHT: 140 LBS | RESPIRATION RATE: 18 BRPM | SYSTOLIC BLOOD PRESSURE: 138 MMHG | HEART RATE: 72 BPM | DIASTOLIC BLOOD PRESSURE: 77 MMHG | OXYGEN SATURATION: 100 % | TEMPERATURE: 98 F | BODY MASS INDEX: 20.73 KG/M2 | HEIGHT: 69 IN

## 2022-08-03 DIAGNOSIS — S01.01XA LACERATION OF SCALP, INITIAL ENCOUNTER: Primary | ICD-10-CM

## 2022-08-03 DIAGNOSIS — S09.90XA INJURY OF HEAD, INITIAL ENCOUNTER: ICD-10-CM

## 2022-08-03 PROCEDURE — 90471 IMMUNIZATION ADMIN: CPT | Performed by: NURSE PRACTITIONER

## 2022-08-03 PROCEDURE — 12001 RPR S/N/AX/GEN/TRNK 2.5CM/<: CPT

## 2022-08-03 PROCEDURE — 63600175 PHARM REV CODE 636 W HCPCS: Performed by: NURSE PRACTITIONER

## 2022-08-03 PROCEDURE — 90715 TDAP VACCINE 7 YRS/> IM: CPT | Performed by: NURSE PRACTITIONER

## 2022-08-03 PROCEDURE — 99285 EMERGENCY DEPT VISIT HI MDM: CPT | Mod: 25

## 2022-08-03 RX ADMIN — TETANUS TOXOID, REDUCED DIPHTHERIA TOXOID AND ACELLULAR PERTUSSIS VACCINE, ADSORBED 0.5 ML: 5; 2.5; 8; 8; 2.5 SUSPENSION INTRAMUSCULAR at 12:08

## 2022-08-03 NOTE — ED NOTES
Tetanus given, pt tolerated well. Head laceration cleaned up with sterile water and clean gauze. There is a 2-3 cm laceration on the right parietal lobe with bleeding controlled. Pt tolerated well.

## 2022-08-03 NOTE — ED PROVIDER NOTES
Encounter Date: 8/3/2022       History     Chief Complaint   Patient presents with    Facial Laceration     Patient states he was working with wood and a 2x4 hit him on the head bleeding controlled, no LOC no blood thinners      Patient is a 56 y.o. male who presents to the ED 08/03/2022 with a chief complaint of head laceration that occurred immediately prior to arrival.  Patient states he was working at his job a put a 2 x 4 up against the wall and he turned around to do something and the 2 x 4 then fell over onto his head and struck him in the head.  His head was bleeding and so he presented to the emergency department.  He did not fall to the floor.  He did not lose consciousness.  He is having no vision changes, numbness, weakness.  He denies any neck pain.  He denies use of any blood thinners.  His last tetanus shot was 6 years ago.              Review of patient's allergies indicates:   Allergen Reactions    Codeine Itching     Anything with codeine    Adhesive Rash     Past Medical History:   Diagnosis Date    Arthritis     Colon polyps     Encounter for blood transfusion     GERD (gastroesophageal reflux disease)     PONV (postoperative nausea and vomiting)      Past Surgical History:   Procedure Laterality Date    bone fusion Left 1977    ankle    COLONOSCOPY N/A 11/12/2018    Procedure: COLONOSCOPY;  Surgeon: Car Coats MD;  Location: [facility];  Service: Endoscopy;  Laterality: N/A;    COLONOSCOPY N/A 7/12/2021    Procedure: COLONOSCOPY;  Surgeon: Car Coats MD;  Location: [facility];  Service: Endoscopy;  Laterality: N/A;    COLONOSCOPY N/A 7/25/2022    Procedure: COLONOSCOPY;  Surgeon: Car Coats MD;  Location: [facility];  Service: Endoscopy;  Laterality: N/A;    ESOPHAGOGASTRODUODENOSCOPY N/A 9/16/2021    Procedure: EGD (ESOPHAGOGASTRODUODENOSCOPY);  Surgeon: Car Coats MD;  Location: [facility];  Service: Endoscopy;  Laterality: N/A;    FRACTURE SURGERY       finger left pinky,     TONSILLECTOMY       Family History   Problem Relation Age of Onset    Alzheimer's disease Mother     Heart disease Father      Social History     Tobacco Use    Smoking status: Former Smoker     Packs/day: 1.50     Years: 30.00     Pack years: 45.00     Types: Cigarettes     Quit date: 2009     Years since quittin.3    Smokeless tobacco: Never Used   Substance Use Topics    Alcohol use: Yes     Alcohol/week: 1.0 standard drink     Types: 1 Glasses of wine per week     Comment: occ    Drug use: Yes     Types: Marijuana     Review of Systems   Constitutional: Negative for chills and fever.   HENT: Negative for sore throat.    Respiratory: Negative for chest tightness and shortness of breath.    Cardiovascular: Negative for chest pain.   Gastrointestinal: Negative for abdominal pain.   Genitourinary: Negative for dysuria.   Musculoskeletal: Negative for arthralgias, myalgias and neck pain.   Skin: Positive for wound. Negative for rash.   Neurological: Negative for syncope.   Hematological: Does not bruise/bleed easily.       Physical Exam     Initial Vitals [22 1129]   BP Pulse Resp Temp SpO2   139/80 77 20 98.1 °F (36.7 °C) 99 %      MAP       --         Physical Exam    Nursing note and vitals reviewed.  Constitutional: Vital signs are normal. He appears well-developed and well-nourished.   HENT:   Head: Normocephalic and atraumatic.       1 cm right occipital scalp laceration without galeal involvement.    Eyes: Pupils are equal, round, and reactive to light.   Neck: Neck supple.    Full passive range of motion without pain.     Cardiovascular: Normal rate, regular rhythm, normal heart sounds and intact distal pulses. Exam reveals no gallop and no friction rub.    No murmur heard.  Pulmonary/Chest: Breath sounds normal. He has no wheezes. He has no rhonchi. He has no rales.   Abdominal: Abdomen is soft. Bowel sounds are normal. There is no abdominal tenderness.    Musculoskeletal:      Cervical back: Full passive range of motion without pain and neck supple. No rigidity. No spinous process tenderness or muscular tenderness. Normal range of motion.     Neurological: He is alert and oriented to person, place, and time. He has normal strength.   No focal neurological deficits noted. Cranial nerves III-XII grossly intact. Equal rapid alternating movements noted.       Skin: Skin is warm, dry and intact.   Psychiatric: He has a normal mood and affect. His speech is normal and behavior is normal.         ED Course   Lac Repair    Date/Time: 8/3/2022 4:40 PM  Performed by: Shama Costello NP  Authorized by: Young العراقي MD     Consent:     Consent obtained:  Verbal    Consent given by:  Patient    Risks, benefits, and alternatives were discussed: yes      Alternatives discussed:  No treatment and delayed treatment  Universal protocol:     Procedure explained and questions answered to patient or proxy's satisfaction: yes      Patient identity confirmed:  Verbally with patient  Anesthesia:     Anesthesia method:  None  Laceration details:     Location:  Scalp    Scalp location:  R parietal    Length (cm):  1  Pre-procedure details:     Preparation:  Patient was prepped and draped in usual sterile fashion  Exploration:     Limited defect created (wound extended): no      Hemostasis achieved with:  Direct pressure    Imaging obtained comment:  CT    Imaging outcome: foreign body not noted      Wound exploration: entire depth of wound visualized      Contaminated: no    Treatment:     Area cleansed with:  Saline and povidone-iodine    Amount of cleaning:  Standard    Irrigation solution:  Sterile saline    Irrigation method:  Syringe    Visualized foreign bodies/material removed: no      Debridement:  None    Undermining:  None    Scar revision: no    Skin repair:     Repair method:  Staples    Number of staples:  2  Approximation:     Approximation:  Close  Repair type:     Repair  type:  Simple  Post-procedure details:     Dressing:  Bulky dressing    Procedure completion:  Tolerated well, no immediate complications      Labs Reviewed - No data to display       Imaging Results          CT Head Without Contrast (Final result)  Result time 08/03/22 12:42:51    Final result by Arron Richardson MD (08/03/22 12:42:51)                 Impression:      Mild right frontoparietal scalp soft tissue swelling and small laceration.  No calvarial fracture or intracranial injury.      Electronically signed by: Arron Richardson MD  Date:    08/03/2022  Time:    12:42             Narrative:    EXAMINATION:  CT HEAD WITHOUT CONTRAST    CLINICAL HISTORY:  Head trauma, skull fracture or hematoma (Age 19-64y);    TECHNIQUE:  Low dose axial images were obtained through the head.  Coronal and sagittal reformations were also performed. Contrast was not administered.    COMPARISON:  02/25/2016    FINDINGS:  Gray-white differentiation is well maintained.  There is no intracranial hemorrhage.  There is no mass or midline shift.  The ventricles are nondilated.  There is moderate calcification of the intracranial ICAs.  There is mild right frontoparietal scalp soft tissue swelling accompanied by small aspiration.  There is no underlying calvarial fracture.                                 Medications   Tdap (BOOSTRIX) vaccine injection 0.5 mL (0.5 mLs Intramuscular Given 8/3/22 1206)     Medical Decision Making:   Differential Diagnosis:   Laceration  Concussion  SDH       APC / Resident Notes:   Patient is a 56 y.o. male who presents to the ED 08/03/2022 who underwent emergent evaluation for laceration to scalp that occurred today.  Head CT without acute findings and patient has a nonfocal neurological exam I do not think any emergent intracranial process such as intracranial hemorrhage or subdural hematoma.  Patient denies any neck pain has no midline C-spine tenderness with normal range of motion of the neck.   Patient has 5/5 strength normal sensation bilateral upper and lower extremities.  Patient's laceration is repaired as above.  Patient tolerated well.  Patient's tetanus is updated today.  Adequate hemostasis is obtained.  I do not think antibiotics are indicated at this time. Based on my clinical evaluation, I do not appreciate any immediate, emergent, or life threatening condition or etiology that warrants additional workup today and feel that the patient can be discharged with close follow up care.  Follow up and return precautions discussed; patient verbalized understanding and is agreeable to plan of care. Patient discharged home in stable condition.                        Clinical Impression:   Final diagnoses:  [S01.01XA] Laceration of scalp, initial encounter (Primary)  [S09.90XA] Injury of head, initial encounter          ED Disposition Condition    Discharge Stable        ED Prescriptions     None        Follow-up Information     Follow up With Specialties Details Why Contact Info    [facility] In 1 week for staple removal [address] [facility]  [address] [place] [id number]  [phone]      [facility] Emergency Medicine  As needed, If symptoms worsen [address] [place] [id number]  [phone]           Shama Costello NP  08/03/22 9984

## 2022-08-09 ENCOUNTER — TELEPHONE (OUTPATIENT)
Dept: FAMILY MEDICINE | Facility: CLINIC | Age: 57
End: 2022-08-09

## 2022-08-09 DIAGNOSIS — N40.0 BENIGN PROSTATIC HYPERPLASIA WITHOUT LOWER URINARY TRACT SYMPTOMS: ICD-10-CM

## 2022-08-09 NOTE — TELEPHONE ENCOUNTER
----- Message from Emiliana Rosas sent at 8/9/2022  8:11 AM CDT -----  Patient called and stated that he went to the ER on Wednesday and he need to follow up in a week to ten days he stated that he also tested positive for covid and today is day five for him  please give him a call at [phone]

## 2022-08-09 NOTE — TELEPHONE ENCOUNTER
----- Message from Emiliana Rosas sent at 8/9/2022  3:22 PM CDT -----  Patient called and stated that he is unable to make the appointment tomorrow please give him a call back at [phone]

## 2022-11-29 ENCOUNTER — OFFICE VISIT (OUTPATIENT)
Dept: FAMILY MEDICINE | Facility: CLINIC | Age: 57
End: 2022-11-29
Payer: COMMERCIAL

## 2022-11-29 VITALS
WEIGHT: 147.38 LBS | OXYGEN SATURATION: 99 % | TEMPERATURE: 97 F | BODY MASS INDEX: 21.83 KG/M2 | DIASTOLIC BLOOD PRESSURE: 70 MMHG | SYSTOLIC BLOOD PRESSURE: 130 MMHG | HEIGHT: 69 IN | HEART RATE: 87 BPM

## 2022-11-29 DIAGNOSIS — E78.2 MIXED HYPERLIPIDEMIA: Primary | ICD-10-CM

## 2022-11-29 DIAGNOSIS — J30.2 SEASONAL ALLERGIES: ICD-10-CM

## 2022-11-29 DIAGNOSIS — N40.0 BENIGN PROSTATIC HYPERPLASIA WITHOUT LOWER URINARY TRACT SYMPTOMS: ICD-10-CM

## 2022-11-29 DIAGNOSIS — Z23 NEED FOR INFLUENZA VACCINATION: ICD-10-CM

## 2022-11-29 PROCEDURE — 1159F MED LIST DOCD IN RCRD: CPT | Mod: CPTII,S$GLB,, | Performed by: PHYSICIAN ASSISTANT

## 2022-11-29 PROCEDURE — 99214 PR OFFICE/OUTPT VISIT, EST, LEVL IV, 30-39 MIN: ICD-10-PCS | Mod: S$GLB,,, | Performed by: PHYSICIAN ASSISTANT

## 2022-11-29 PROCEDURE — 3008F BODY MASS INDEX DOCD: CPT | Mod: CPTII,S$GLB,, | Performed by: PHYSICIAN ASSISTANT

## 2022-11-29 PROCEDURE — 1159F PR MEDICATION LIST DOCUMENTED IN MEDICAL RECORD: ICD-10-PCS | Mod: CPTII,S$GLB,, | Performed by: PHYSICIAN ASSISTANT

## 2022-11-29 PROCEDURE — 3078F PR MOST RECENT DIASTOLIC BLOOD PRESSURE < 80 MM HG: ICD-10-PCS | Mod: CPTII,S$GLB,, | Performed by: PHYSICIAN ASSISTANT

## 2022-11-29 PROCEDURE — 3075F SYST BP GE 130 - 139MM HG: CPT | Mod: CPTII,S$GLB,, | Performed by: PHYSICIAN ASSISTANT

## 2022-11-29 PROCEDURE — 3078F DIAST BP <80 MM HG: CPT | Mod: CPTII,S$GLB,, | Performed by: PHYSICIAN ASSISTANT

## 2022-11-29 PROCEDURE — 1160F RVW MEDS BY RX/DR IN RCRD: CPT | Mod: CPTII,S$GLB,, | Performed by: PHYSICIAN ASSISTANT

## 2022-11-29 PROCEDURE — 99214 OFFICE O/P EST MOD 30 MIN: CPT | Mod: S$GLB,,, | Performed by: PHYSICIAN ASSISTANT

## 2022-11-29 PROCEDURE — 1160F PR REVIEW ALL MEDS BY PRESCRIBER/CLIN PHARMACIST DOCUMENTED: ICD-10-PCS | Mod: CPTII,S$GLB,, | Performed by: PHYSICIAN ASSISTANT

## 2022-11-29 PROCEDURE — 3075F PR MOST RECENT SYSTOLIC BLOOD PRESS GE 130-139MM HG: ICD-10-PCS | Mod: CPTII,S$GLB,, | Performed by: PHYSICIAN ASSISTANT

## 2022-11-29 PROCEDURE — 3008F PR BODY MASS INDEX (BMI) DOCUMENTED: ICD-10-PCS | Mod: CPTII,S$GLB,, | Performed by: PHYSICIAN ASSISTANT

## 2022-11-29 RX ORDER — ATORVASTATIN CALCIUM 10 MG/1
10 TABLET, FILM COATED ORAL NIGHTLY
Qty: 90 TABLET | Refills: 1 | Status: SHIPPED | OUTPATIENT
Start: 2022-11-29 | End: 2023-06-15 | Stop reason: SINTOL

## 2022-11-29 NOTE — PROGRESS NOTES
"  SUBJECTIVE:    Patient ID: Cornelius Couch is a 57 y.o. male.    Chief Complaint: Follow-up (No bottles/ 6 months f/u no complaints decline Flu/BG)    Pt is a 57 y.o. male w/ a PMHx of seasonal allergies, HLP, and BPH  who presents today for a 6-month follow-up.  Overall, he reports feeling well and is without complaints.  Regarding his HLP, it is currently controlled with diet and exercise alone.  As for diet, he averages 1 meal/day with healthy snacks in between.  He avoids all fast food and limits his red meat intake.  He does consume fish often.  His main source of exercise is work and walking his dog 2 blocks daily. On previous lab work, LDL was slightly elevated.  Regarding his BPH, he has discontinued his Flomax 0.4 mg q.d.  He denies any LUTS symptoms today and only experiences nocturia once/night. Last PSA (6/2/22): 0.63. He quit smoking cigarettes in 2009, but does smoke recreational marijuana 2-3x/week.  He denies any CP, palpitations, dizziness, lightheadedness, or syncopal episodes.  He denies any previous history of CAD, but does have a family history - father suffered an MI at age 70.  BP is stable and well controlled in office today.      UTD: Colonoscopy (07/25/2022) -  (GI) - Repeat colonoscopy in 3 years.    Due for updated lab work.    Due for influenza vaccine.    Admission on 07/25/2022, Discharged on 07/25/2022   Component Date Value Ref Range Status    Final Pathologic Diagnosis 07/25/2022    Final                    Value:Part 1  Colon, ascending, polyp, biopsy:  Tubular adenoma  Part 2  Colon, sigmoid, polyp:  Sessile serrated polyp      Gross 07/25/2022    Final                    Value:Number of containers:  2  Part 1  Container Label: Clinic Number/AP Number:  [id number], and "ascending colon polyp"  Received in formalin is a 3 x 2 x 2 mm soft tan polypoid tissue fragment.  Entirely submitted in LOS--1-A.  Part 2  Container Label: Clinic Number/AP Number:  [id number], and " ""sigmoid colon polyps"  Received in formalin in polyp trap 1 are 2 soft tan polypoid tissue fragments  measuring 3 and 7 mm in greatest dimension.  Entirely submitted in  TTF--2-TAVO Diane.      Disclaimer 07/25/2022    Final                    Value:Unless the case is a 'gross only' or additional testing only, the final  diagnosis for each specimen is based on a microscopic examination of  appropriate tissue sections.     Lab Visit on 07/22/2022   Component Date Value Ref Range Status    SARS-CoV2 (COVID-19) Qualitative P* 07/22/2022 Not Detected  Not Detected Final    SARS-COV-2- Cycle Number 07/22/2022 N/A   Final       Past Medical History:   Diagnosis Date    Arthritis     Colon polyps     Encounter for blood transfusion     GERD (gastroesophageal reflux disease)     PONV (postoperative nausea and vomiting)      Past Surgical History:   Procedure Laterality Date    bone fusion Left 1977    ankle    COLONOSCOPY N/A 11/12/2018    Procedure: COLONOSCOPY;  Surgeon: Car Coats MD;  Location: [facility];  Service: Endoscopy;  Laterality: N/A;    COLONOSCOPY N/A 7/12/2021    Procedure: COLONOSCOPY;  Surgeon: Car Coats MD;  Location: [facility];  Service: Endoscopy;  Laterality: N/A;    COLONOSCOPY N/A 7/25/2022    Procedure: COLONOSCOPY;  Surgeon: Car Coats MD;  Location: [facility];  Service: Endoscopy;  Laterality: N/A;    ESOPHAGOGASTRODUODENOSCOPY N/A 9/16/2021    Procedure: EGD (ESOPHAGOGASTRODUODENOSCOPY);  Surgeon: Car Coats MD;  Location: [facility];  Service: Endoscopy;  Laterality: N/A;    FRACTURE SURGERY      finger left pinky,     TONSILLECTOMY       Family History   Problem Relation Age of Onset    Alzheimer's disease Mother     Heart disease Father        Marital Status:   Alcohol History:  reports current alcohol use of about 1.0 standard drink per week.  Tobacco History:  reports that he quit smoking about 13 years ago. His smoking use included " "cigarettes. He has a 45.00 pack-year smoking history. He has never used smokeless tobacco.  Drug History:  reports current drug use. Drug: Marijuana.    Health Maintenance Topics with due status: Not Due       Topic Last Completion Date    Lipid Panel 06/02/2022    Colorectal Cancer Screening 07/25/2022    TETANUS VACCINE 08/03/2022     Immunization History   Administered Date(s) Administered    Tdap 04/18/2017, 08/03/2022       Review of patient's allergies indicates:   Allergen Reactions    Codeine Itching     Anything with codeine    Adhesive Rash       Current Outpatient Medications:     atorvastatin (LIPITOR) 10 MG tablet, Take 1 tablet (10 mg total) by mouth every evening., Disp: 90 tablet, Rfl: 1    tamsulosin (FLOMAX) 0.4 mg Cap, Take 1 capsule (0.4 mg total) by mouth once daily., Disp: 90 capsule, Rfl: 1    Review of Systems   Constitutional:  Negative for activity change, appetite change, chills, fatigue and fever.   HENT:  Negative for congestion, ear discharge, ear pain, postnasal drip and sore throat.    Respiratory:  Negative for cough, shortness of breath and wheezing.    Cardiovascular:  Negative for chest pain, palpitations and leg swelling.   Gastrointestinal:  Negative for abdominal pain, constipation, diarrhea, nausea and vomiting.   Genitourinary:  Negative for decreased urine volume, difficulty urinating, dysuria, frequency, hematuria and urgency.   Musculoskeletal:  Negative for arthralgias and myalgias.   Skin:  Negative for rash.   Neurological:  Negative for dizziness, syncope, weakness, light-headedness and headaches.   Psychiatric/Behavioral:  Negative for behavioral problems.         Objective:      Vitals:    11/29/22 1527   BP: 130/70   Pulse: 87   Temp: 97.1 °F (36.2 °C)   SpO2: 99%   Weight: 66.9 kg (147 lb 6.4 oz)   Height: 5' 9" (1.753 m)     Physical Exam  Vitals and nursing note reviewed.   Constitutional:       General: He is not in acute distress.     Appearance: Normal " appearance. He is not ill-appearing or toxic-appearing.   HENT:      Head: Normocephalic and atraumatic.      Right Ear: Tympanic membrane, ear canal and external ear normal. There is no impacted cerumen.      Left Ear: Tympanic membrane, ear canal and external ear normal. There is no impacted cerumen.      Nose: Nose normal. No rhinorrhea.      Mouth/Throat:      Mouth: Mucous membranes are moist.      Pharynx: Oropharynx is clear. No oropharyngeal exudate or posterior oropharyngeal erythema.      Comments: No leukoplakia noted on the tongue or buccal mucosa.  Eyes:      General: No scleral icterus.     Conjunctiva/sclera: Conjunctivae normal.   Neck:      Vascular: No carotid bruit.   Cardiovascular:      Rate and Rhythm: Normal rate and regular rhythm.      Pulses: Normal pulses.      Heart sounds: Normal heart sounds. No murmur heard.    No friction rub.   Pulmonary:      Effort: Pulmonary effort is normal.      Breath sounds: Normal breath sounds. No wheezing, rhonchi or rales.   Abdominal:      General: Bowel sounds are normal. There is no distension.      Palpations: Abdomen is soft.      Tenderness: There is no abdominal tenderness. There is no guarding or rebound.   Musculoskeletal:         General: No swelling. Normal range of motion.      Cervical back: Normal range of motion.      Right lower leg: No edema.      Left lower leg: No edema.   Skin:     General: Skin is warm and dry.      Coloration: Skin is not jaundiced.      Findings: No rash.   Neurological:      General: No focal deficit present.      Mental Status: He is alert. Mental status is at baseline.      Cranial Nerves: No cranial nerve deficit.      Motor: No weakness.      Coordination: Coordination normal.      Gait: Gait normal.   Psychiatric:         Mood and Affect: Mood normal.         Behavior: Behavior normal. Behavior is cooperative.         Assessment:       1. Mixed hyperlipidemia    2. Benign prostatic hyperplasia without lower  urinary tract symptoms    3. Need for influenza vaccination    4. Seasonal allergies           Plan:       Mixed hyperlipidemia  Tot Chol: 208, LDL: 136, HDL: 52  Start Lipitor 10 mg q.h.s. and begin following a Mediterranean diet.  Repeat CMP and lipid panel in 3 months after starting statin therapy.  -     Comprehensive Metabolic Panel; Future; Expected date: 11/29/2022  -     Lipid Panel; Future; Expected date: 11/29/2022  -     atorvastatin (LIPITOR) 10 MG tablet; Take 1 tablet (10 mg total) by mouth every evening.  Dispense: 90 tablet; Refill: 1    Benign prostatic hyperplasia without lower urinary tract symptoms  Denies any LUTS and frequent nocturia.  Last PSA:  0.63.  Patient discontinue Flomax.  If symptoms return, reinitiate Flomax 0.4 mg daily.  -     CBC Auto Differential; Future; Expected date: 11/29/2022  -     Urinalysis, Reflex to Urine Culture Urine, Clean Catch; Future; Expected date: 11/29/2022    Need for influenza vaccination  Offered influenza vaccine today, but patient declined.    Seasonal allergies    6 month lab work ordered today to be completed when patient is fasting.    Follow up in about 6 months (around 5/29/2023) for Annual Wellness, HLP, With labs prior to visit.        11/29/2022 Gurvinder Lofton PA-C

## 2023-03-13 ENCOUNTER — TELEPHONE (OUTPATIENT)
Dept: FAMILY MEDICINE | Facility: CLINIC | Age: 58
End: 2023-03-13

## 2023-03-13 DIAGNOSIS — M25.462 SWELLING OF JOINT OF LEFT KNEE: Primary | ICD-10-CM

## 2023-03-13 NOTE — TELEPHONE ENCOUNTER
----- Message from Jewels Lal MA sent at 3/13/2023 11:49 AM CDT -----    ----- Message -----  From: Kiya Crenshaw  Sent: 3/13/2023  11:33 AM CDT  To: [facility] Staff    Pt returning a phone call [phone]

## 2023-03-13 NOTE — TELEPHONE ENCOUNTER
Pt states he is having left knee discomfort. Started 2 months ago. Thinks he has a cyst. Having knee swelling. Denies trauma. Referral faxed to Dr. Ochoa. Pt given contact info.

## 2023-03-13 NOTE — TELEPHONE ENCOUNTER
----- Message from Kiya Crenshaw sent at 3/13/2023 10:05 AM CDT -----  Pt would like to be seen for LT knee pain.[phone]

## 2023-03-23 ENCOUNTER — OFFICE VISIT (OUTPATIENT)
Dept: ORTHOPEDICS | Facility: CLINIC | Age: 58
End: 2023-03-23
Payer: COMMERCIAL

## 2023-03-23 VITALS — BODY MASS INDEX: 21.77 KG/M2 | WEIGHT: 147 LBS | HEIGHT: 69 IN

## 2023-03-23 DIAGNOSIS — M70.52 INFRAPATELLAR BURSITIS OF LEFT KNEE: Primary | ICD-10-CM

## 2023-03-23 PROCEDURE — 1160F RVW MEDS BY RX/DR IN RCRD: CPT | Mod: CPTII,S$GLB,, | Performed by: ORTHOPAEDIC SURGERY

## 2023-03-23 PROCEDURE — 3008F PR BODY MASS INDEX (BMI) DOCUMENTED: ICD-10-PCS | Mod: CPTII,S$GLB,, | Performed by: ORTHOPAEDIC SURGERY

## 2023-03-23 PROCEDURE — 1160F PR REVIEW ALL MEDS BY PRESCRIBER/CLIN PHARMACIST DOCUMENTED: ICD-10-PCS | Mod: CPTII,S$GLB,, | Performed by: ORTHOPAEDIC SURGERY

## 2023-03-23 PROCEDURE — 99203 PR OFFICE/OUTPT VISIT, NEW, LEVL III, 30-44 MIN: ICD-10-PCS | Mod: S$GLB,,, | Performed by: ORTHOPAEDIC SURGERY

## 2023-03-23 PROCEDURE — 1159F PR MEDICATION LIST DOCUMENTED IN MEDICAL RECORD: ICD-10-PCS | Mod: CPTII,S$GLB,, | Performed by: ORTHOPAEDIC SURGERY

## 2023-03-23 PROCEDURE — 1159F MED LIST DOCD IN RCRD: CPT | Mod: CPTII,S$GLB,, | Performed by: ORTHOPAEDIC SURGERY

## 2023-03-23 PROCEDURE — 3008F BODY MASS INDEX DOCD: CPT | Mod: CPTII,S$GLB,, | Performed by: ORTHOPAEDIC SURGERY

## 2023-03-23 PROCEDURE — 99203 OFFICE O/P NEW LOW 30 MIN: CPT | Mod: S$GLB,,, | Performed by: ORTHOPAEDIC SURGERY

## 2023-03-23 RX ORDER — CHOLECALCIFEROL (VITAMIN D3) 25 MCG
1000 TABLET ORAL DAILY
COMMUNITY

## 2023-03-23 RX ORDER — MELOXICAM 15 MG/1
15 TABLET ORAL DAILY
Qty: 30 TABLET | Refills: 2 | Status: SHIPPED | OUTPATIENT
Start: 2023-03-23 | End: 2023-06-21

## 2023-03-23 RX ORDER — DICLOFENAC SODIUM 10 MG/G
2 GEL TOPICAL 2 TIMES DAILY
Qty: 300 G | Refills: 2 | Status: SHIPPED | OUTPATIENT
Start: 2023-03-23 | End: 2023-12-12

## 2023-03-23 NOTE — PROGRESS NOTES
[facility]    Subjective:     Chief Complaint:   Chief Complaint   Patient presents with    Left Knee - Pain     Patient is here with complaints if Left knee pain x 2 months, painful to kneel & weightbearing. 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 Currently swollen.        Past Medical History:   Diagnosis Date    Arthritis     Colon polyps     Encounter for blood transfusion     GERD (gastroesophageal reflux disease)     PONV (postoperative nausea and vomiting)        Past Surgical History:   Procedure Laterality Date    bone fusion Left 1977    ankle    COLONOSCOPY N/A 11/12/2018    Procedure: COLONOSCOPY;  Surgeon: Car Coats MD;  Location: [facility] ENDO;  Service: Endoscopy;  Laterality: N/A;    COLONOSCOPY N/A 7/12/2021    Procedure: COLONOSCOPY;  Surgeon: Car Coats MD;  Location: [facility] ENDO;  Service: Endoscopy;  Laterality: N/A;    COLONOSCOPY N/A 7/25/2022    Procedure: COLONOSCOPY;  Surgeon: Car Coats MD;  Location: [facility] ENDO;  Service: Endoscopy;  Laterality: N/A;    ESOPHAGOGASTRODUODENOSCOPY N/A 9/16/2021    Procedure: EGD (ESOPHAGOGASTRODUODENOSCOPY);  Surgeon: Car Coats MD;  Location: [facility] ENDO;  Service: Endoscopy;  Laterality: N/A;    FRACTURE SURGERY      finger left pinky,     TONSILLECTOMY         Current Outpatient Medications   Medication Sig    ascorbic acid, vitamin C, (VITAMIN C) 100 MG tablet Take 100 mg by mouth once daily.    vitamin D (VITAMIN D3) 1000 units Tab Take 1,000 Units by mouth once daily.    atorvastatin (LIPITOR) 10 MG tablet Take 1 tablet (10 mg total) by mouth every evening. (Patient not taking: Reported on 3/23/2023)    tamsulosin (FLOMAX) 0.4 mg Cap Take 1 capsule (0.4 mg total) by mouth once daily. (Patient not taking: Reported on 3/23/2023)     No current facility-administered medications for this visit.       Review of patient's allergies indicates:   Allergen Reactions    Codeine Itching      Anything with codeine    Adhesive Rash       Family History   Problem Relation Age of Onset    Alzheimer's disease Mother     Heart disease Father        Social History     Socioeconomic History    Marital status:    Tobacco Use    Smoking status: Former     Packs/day: 1.50     Years: 30.00     Pack years: 45.00     Types: Cigarettes     Quit date: 2009     Years since quittin.9    Smokeless tobacco: Never   Substance and Sexual Activity    Alcohol use: Yes     Alcohol/week: 1.0 standard drink     Types: 1 Glasses of wine per week     Comment: occ    Drug use: Yes     Types: Marijuana       History of present illness:  57-year-old male, presents to clinic today for his left knee.  For approximately 2 months, he has had some pain and swelling over the proximal lower leg just below the patella.  He is a , he crawls around a lot on the floor.  Does remember any blunt trauma or inciting event however symptoms persist and he is uncomfortable.      Review of Systems:    Constitution: Negative for chills, fever, and sweats.  Negative for unexplained weight loss.    HENT:  Negative for headaches and blurry vision.    Cardiovascular:Negative for chest pain or irregular heart beat. Negative for hypertension.    Respiratory:  Negative for cough and shortness of breath.    Gastrointestinal: Negative for abdominal pain, heartburn, melena, nausea, and vomitting.    Genitourinary:  Negative bladder incontinence and dysuria.    Musculoskeletal:  See HPI for details.     Neurological: Negative for numbness.    Psychiatric/Behavioral: Negative for depression.  The patient is not nervous/anxious.      Endocrine: Negative for polyuria    Hematologic/Lymphatic: Negative for bleeding problem.  Does not bruise/bleed easily.    Skin: Negative for poor would healing and rash    Objective:      Physical Examination:    Vital Signs:  There were no vitals filed for this visit.    Body mass index is 21.71 kg/m².    This  "a well-developed, well nourished patient in no acute distress.  They are alert and oriented and cooperative to examination.        Examination of the left knee, no effusion, no erythema or ecchymosis, dry and intact skin.  No signs symptoms of infection.  Range of motion 0-120 degrees.  Stable anterior-posterior varus and valgus stress.  Patient does have some infrapatellar swelling and bursitis.  No prepatellar bursitis.  No tenderness to palpation over the tibial tuberosity.    Pertinent New Results:    XRAY Report / Interpretation:   AP lateral oblique views of the left knee taken today in the office do not demonstrate any obvious osseous abnormalities or advanced osteoarthritis.    Assessment/Plan:      57-year-old male, left knee pain, infrapatellar bursitis.  Recommend topical diclofenac, activity modification, such as knee pads.  Daily anti-inflammatory Mobic 15 mg once a day.  We did aspirate the fluid collection today, approximately 3 cc of clear yellow fluid were returned.  Follow-up in 2 weeks, consider repeat aspiration and/or steroid injection.    Steven Gil, Physician Assistant, served in the capacity as a "scribe" for this patient encounter.  A "face-to-face" encounter occurred with Dr. Orlin Ochoa on this date.  The treatment plan and medical decision-making is outlined above. Patient was seen and examined with a chaperone.       This note was created using Dragon voice recognition software that occasionally misinterpreted phrases or words.          "

## 2023-04-25 ENCOUNTER — OFFICE VISIT (OUTPATIENT)
Dept: ORTHOPEDICS | Facility: CLINIC | Age: 58
End: 2023-04-25
Payer: COMMERCIAL

## 2023-04-25 VITALS — HEIGHT: 69 IN | BODY MASS INDEX: 21.77 KG/M2 | WEIGHT: 147 LBS

## 2023-04-25 DIAGNOSIS — M70.52 INFRAPATELLAR BURSITIS OF LEFT KNEE: Primary | ICD-10-CM

## 2023-04-25 PROCEDURE — 20610 LARGE JOINT ASPIRATION/INJECTION: L PATELLAR BURSA: ICD-10-PCS | Mod: LT,S$GLB,, | Performed by: ORTHOPAEDIC SURGERY

## 2023-04-25 PROCEDURE — 99213 OFFICE O/P EST LOW 20 MIN: CPT | Mod: 25,S$GLB,, | Performed by: ORTHOPAEDIC SURGERY

## 2023-04-25 PROCEDURE — 1159F PR MEDICATION LIST DOCUMENTED IN MEDICAL RECORD: ICD-10-PCS | Mod: CPTII,S$GLB,, | Performed by: ORTHOPAEDIC SURGERY

## 2023-04-25 PROCEDURE — 3008F PR BODY MASS INDEX (BMI) DOCUMENTED: ICD-10-PCS | Mod: CPTII,S$GLB,, | Performed by: ORTHOPAEDIC SURGERY

## 2023-04-25 PROCEDURE — 3008F BODY MASS INDEX DOCD: CPT | Mod: CPTII,S$GLB,, | Performed by: ORTHOPAEDIC SURGERY

## 2023-04-25 PROCEDURE — 1159F MED LIST DOCD IN RCRD: CPT | Mod: CPTII,S$GLB,, | Performed by: ORTHOPAEDIC SURGERY

## 2023-04-25 PROCEDURE — 99213 PR OFFICE/OUTPT VISIT, EST, LEVL III, 20-29 MIN: ICD-10-PCS | Mod: 25,S$GLB,, | Performed by: ORTHOPAEDIC SURGERY

## 2023-04-25 PROCEDURE — 20610 DRAIN/INJ JOINT/BURSA W/O US: CPT | Mod: LT,S$GLB,, | Performed by: ORTHOPAEDIC SURGERY

## 2023-04-25 NOTE — PROCEDURES
Large Joint Aspiration/Injection: L patellar bursa    Date/Time: 4/25/2023 12:45 PM  Performed by: Orlin Ochoa MD  Authorized by: Orlin Ochoa MD     Consent Done?:  Yes (Verbal)  Indications:  Pain  Site marked: the procedure site was marked    Timeout: prior to procedure the correct patient, procedure, and site was verified    Prep: patient was prepped and draped in usual sterile fashion    Local anesthesia used?: No    Local anesthetic:  Lidocaine 1% without epinephrine  Ultrasonic Guidance for needle placement?: No    Location:  Knee  Site:  L patellar bursa  Aspirate amount (mL):  6  Patient tolerance:  Patient tolerated the procedure well with no immediate complications

## 2023-04-25 NOTE — PROGRESS NOTES
[facility]    Subjective:     Chief Complaint:   Chief Complaint   Patient presents with    Left Knee - Pain     Patient had his knee aspirated on 03/23/23, the knee is swollen again but not as big, its painful to stand for a while.       Past Medical History:   Diagnosis Date    Arthritis     Colon polyps     Encounter for blood transfusion     GERD (gastroesophageal reflux disease)     PONV (postoperative nausea and vomiting)        Past Surgical History:   Procedure Laterality Date    bone fusion Left 1977    ankle    COLONOSCOPY N/A 11/12/2018    Procedure: COLONOSCOPY;  Surgeon: Car Coats MD;  Location: [facility] ENDO;  Service: Endoscopy;  Laterality: N/A;    COLONOSCOPY N/A 7/12/2021    Procedure: COLONOSCOPY;  Surgeon: Car Coats MD;  Location: [facility] ENDO;  Service: Endoscopy;  Laterality: N/A;    COLONOSCOPY N/A 7/25/2022    Procedure: COLONOSCOPY;  Surgeon: Car Coats MD;  Location: [facility] ENDO;  Service: Endoscopy;  Laterality: N/A;    ESOPHAGOGASTRODUODENOSCOPY N/A 9/16/2021    Procedure: EGD (ESOPHAGOGASTRODUODENOSCOPY);  Surgeon: Car Coats MD;  Location: [facility] ENDO;  Service: Endoscopy;  Laterality: N/A;    FRACTURE SURGERY      finger left pinky,     TONSILLECTOMY         Current Outpatient Medications   Medication Sig    ascorbic acid, vitamin C, (VITAMIN C) 100 MG tablet Take 100 mg by mouth once daily.    atorvastatin (LIPITOR) 10 MG tablet Take 1 tablet (10 mg total) by mouth every evening.    diclofenac sodium (VOLTAREN) 1 % Gel Apply 2 g topically 2 (two) times daily.    meloxicam (MOBIC) 15 MG tablet Take 1 tablet (15 mg total) by mouth once daily.    vitamin D (VITAMIN D3) 1000 units Tab Take 1,000 Units by mouth once daily.    tamsulosin (FLOMAX) 0.4 mg Cap Take 1 capsule (0.4 mg total) by mouth once daily. (Patient not taking: Reported on 3/23/2023)     No current facility-administered medications for this visit.       Review of patient's allergies indicates:    Allergen Reactions    Codeine Itching     Anything with codeine    Adhesive Rash       Family History   Problem Relation Age of Onset    Alzheimer's disease Mother     Heart disease Father        Social History     Socioeconomic History    Marital status:    Tobacco Use    Smoking status: Former     Packs/day: 1.50     Years: 30.00     Pack years: 45.00     Types: Cigarettes     Quit date: 2009     Years since quittin.0    Smokeless tobacco: Never   Substance and Sexual Activity    Alcohol use: Yes     Alcohol/week: 1.0 standard drink     Types: 1 Glasses of wine per week     Comment: occ    Drug use: Yes     Types: Marijuana       History of present illness:  Patient comes in today for the left knee.  He is doing much better.  He has had some recurrence of the prepatellar fluid but not all of it.      Review of Systems:    Constitution: Negative for chills, fever, and sweats.  Negative for unexplained weight loss.    HENT:  Negative for headaches and blurry vision.    Cardiovascular:Negative for chest pain or irregular heart beat. Negative for hypertension.    Respiratory:  Negative for cough and shortness of breath.    Gastrointestinal: Negative for abdominal pain, heartburn, melena, nausea, and vomitting.    Genitourinary:  Negative bladder incontinence and dysuria.    Musculoskeletal:  See HPI for details.     Neurological: Negative for numbness.    Psychiatric/Behavioral: Negative for depression.  The patient is not nervous/anxious.      Endocrine: Negative for polyuria    Hematologic/Lymphatic: Negative for bleeding problem.  Does not bruise/bleed easily.    Skin: Negative for poor would healing and rash    Objective:      Physical Examination:    Vital Signs:  There were no vitals filed for this visit.    Body mass index is 21.71 kg/m².    This a well-developed, well nourished patient in no acute distress.  They are alert and oriented and cooperative to examination.        Patient has some  bogginess over the prepatellar area.  I aspirated 8 cc of fairly clear fluid.  Pertinent New Results:    XRAY Report / Interpretation:   No new XRAYS Today.    Assessment/Plan:      Prepatellar bursitis.  He is improving.  We aspirate little bit of fluid off him.  He will follow-up pocket in a month if he has reaccumulation      This note was created using Dragon voice recognition software that occasionally misinterpreted phrases or words.

## 2023-06-02 ENCOUNTER — TELEPHONE (OUTPATIENT)
Dept: FAMILY MEDICINE | Facility: CLINIC | Age: 58
End: 2023-06-02

## 2023-06-02 NOTE — TELEPHONE ENCOUNTER
Spoke with the pt, stated that you have blood work to do before your up-coming appointment on 06/15/2023 with Wilner Juarez. The blood work was ordered through [facility], so you can come into our office with no appointment necessary. Make sure that you are fasting for the blood work, so eating after midnight the night before your blood work. You can have water or  black coffee with out any sugar or creamer. Pt acknowledged understanding.

## 2023-06-09 LAB
ALBUMIN SERPL-MCNC: 4.2 G/DL (ref 3.6–5.1)
ALBUMIN/GLOB SERPL: 1.8 (CALC) (ref 1–2.5)
ALP SERPL-CCNC: 53 U/L (ref 35–144)
ALT SERPL-CCNC: 15 U/L (ref 9–46)
APPEARANCE UR: CLEAR
AST SERPL-CCNC: 15 U/L (ref 10–35)
BACTERIA #/AREA URNS HPF: NORMAL /HPF
BACTERIA UR CULT: NORMAL
BASOPHILS # BLD AUTO: 69 CELLS/UL (ref 0–200)
BASOPHILS NFR BLD AUTO: 1.5 %
BILIRUB SERPL-MCNC: 0.5 MG/DL (ref 0.2–1.2)
BILIRUB UR QL STRIP: NEGATIVE
BUN SERPL-MCNC: 16 MG/DL (ref 7–25)
BUN/CREAT SERPL: NORMAL (CALC) (ref 6–22)
CALCIUM SERPL-MCNC: 9.3 MG/DL (ref 8.6–10.3)
CHLORIDE SERPL-SCNC: 108 MMOL/L (ref 98–110)
CHOLEST SERPL-MCNC: 228 MG/DL
CHOLEST/HDLC SERPL: 4 (CALC)
CO2 SERPL-SCNC: 28 MMOL/L (ref 20–32)
COLOR UR: YELLOW
CREAT SERPL-MCNC: 0.81 MG/DL (ref 0.7–1.3)
EGFR: 102 ML/MIN/1.73M2
EOSINOPHIL # BLD AUTO: 207 CELLS/UL (ref 15–500)
EOSINOPHIL NFR BLD AUTO: 4.5 %
ERYTHROCYTE [DISTWIDTH] IN BLOOD BY AUTOMATED COUNT: 12.6 % (ref 11–15)
GLOBULIN SER CALC-MCNC: 2.3 G/DL (CALC) (ref 1.9–3.7)
GLUCOSE SERPL-MCNC: 99 MG/DL (ref 65–99)
GLUCOSE UR QL STRIP: NEGATIVE
HCT VFR BLD AUTO: 42.7 % (ref 38.5–50)
HDLC SERPL-MCNC: 57 MG/DL
HGB BLD-MCNC: 14.8 G/DL (ref 13.2–17.1)
HGB UR QL STRIP: NEGATIVE
HYALINE CASTS #/AREA URNS LPF: NORMAL /LPF
KETONES UR QL STRIP: NEGATIVE
LDLC SERPL CALC-MCNC: 149 MG/DL (CALC)
LEUKOCYTE ESTERASE UR QL STRIP: NEGATIVE
LYMPHOCYTES # BLD AUTO: 1334 CELLS/UL (ref 850–3900)
LYMPHOCYTES NFR BLD AUTO: 29 %
MCH RBC QN AUTO: 32.7 PG (ref 27–33)
MCHC RBC AUTO-ENTMCNC: 34.7 G/DL (ref 32–36)
MCV RBC AUTO: 94.3 FL (ref 80–100)
MONOCYTES # BLD AUTO: 469 CELLS/UL (ref 200–950)
MONOCYTES NFR BLD AUTO: 10.2 %
NEUTROPHILS # BLD AUTO: 2521 CELLS/UL (ref 1500–7800)
NEUTROPHILS NFR BLD AUTO: 54.8 %
NITRITE UR QL STRIP: NEGATIVE
NONHDLC SERPL-MCNC: 171 MG/DL (CALC)
PH UR STRIP: 6 [PH] (ref 5–8)
PLATELET # BLD AUTO: 279 THOUSAND/UL (ref 140–400)
PMV BLD REES-ECKER: 10.3 FL (ref 7.5–12.5)
POTASSIUM SERPL-SCNC: 4.3 MMOL/L (ref 3.5–5.3)
PROT SERPL-MCNC: 6.5 G/DL (ref 6.1–8.1)
PROT UR QL STRIP: NEGATIVE
RBC # BLD AUTO: 4.53 MILLION/UL (ref 4.2–5.8)
RBC #/AREA URNS HPF: NORMAL /HPF
SERVICE CMNT-IMP: NORMAL
SODIUM SERPL-SCNC: 142 MMOL/L (ref 135–146)
SP GR UR STRIP: 1.01 (ref 1–1.03)
SQUAMOUS #/AREA URNS HPF: NORMAL /HPF
TRIGL SERPL-MCNC: 102 MG/DL
WBC # BLD AUTO: 4.6 THOUSAND/UL (ref 3.8–10.8)
WBC #/AREA URNS HPF: NORMAL /HPF

## 2023-06-15 ENCOUNTER — OFFICE VISIT (OUTPATIENT)
Dept: FAMILY MEDICINE | Facility: CLINIC | Age: 58
End: 2023-06-15
Payer: COMMERCIAL

## 2023-06-15 VITALS
SYSTOLIC BLOOD PRESSURE: 132 MMHG | DIASTOLIC BLOOD PRESSURE: 78 MMHG | HEIGHT: 69 IN | BODY MASS INDEX: 22.22 KG/M2 | HEART RATE: 88 BPM | WEIGHT: 150 LBS

## 2023-06-15 DIAGNOSIS — E78.2 MIXED HYPERLIPIDEMIA: ICD-10-CM

## 2023-06-15 DIAGNOSIS — K21.9 GASTROESOPHAGEAL REFLUX DISEASE, UNSPECIFIED WHETHER ESOPHAGITIS PRESENT: Primary | ICD-10-CM

## 2023-06-15 DIAGNOSIS — Z00.00 ROUTINE PHYSICAL EXAMINATION: ICD-10-CM

## 2023-06-15 PROCEDURE — 3075F SYST BP GE 130 - 139MM HG: CPT | Mod: CPTII,S$GLB,, | Performed by: PHYSICIAN ASSISTANT

## 2023-06-15 PROCEDURE — 3078F DIAST BP <80 MM HG: CPT | Mod: CPTII,S$GLB,, | Performed by: PHYSICIAN ASSISTANT

## 2023-06-15 PROCEDURE — 3008F BODY MASS INDEX DOCD: CPT | Mod: CPTII,S$GLB,, | Performed by: PHYSICIAN ASSISTANT

## 2023-06-15 PROCEDURE — 3075F PR MOST RECENT SYSTOLIC BLOOD PRESS GE 130-139MM HG: ICD-10-PCS | Mod: CPTII,S$GLB,, | Performed by: PHYSICIAN ASSISTANT

## 2023-06-15 PROCEDURE — 3008F PR BODY MASS INDEX (BMI) DOCUMENTED: ICD-10-PCS | Mod: CPTII,S$GLB,, | Performed by: PHYSICIAN ASSISTANT

## 2023-06-15 PROCEDURE — 3078F PR MOST RECENT DIASTOLIC BLOOD PRESSURE < 80 MM HG: ICD-10-PCS | Mod: CPTII,S$GLB,, | Performed by: PHYSICIAN ASSISTANT

## 2023-06-15 PROCEDURE — 1159F MED LIST DOCD IN RCRD: CPT | Mod: CPTII,S$GLB,, | Performed by: PHYSICIAN ASSISTANT

## 2023-06-15 PROCEDURE — 99396 PREV VISIT EST AGE 40-64: CPT | Mod: S$GLB,,, | Performed by: PHYSICIAN ASSISTANT

## 2023-06-15 PROCEDURE — 1159F PR MEDICATION LIST DOCUMENTED IN MEDICAL RECORD: ICD-10-PCS | Mod: CPTII,S$GLB,, | Performed by: PHYSICIAN ASSISTANT

## 2023-06-15 PROCEDURE — 99396 PR PREVENTIVE VISIT,EST,40-64: ICD-10-PCS | Mod: S$GLB,,, | Performed by: PHYSICIAN ASSISTANT

## 2023-06-15 RX ORDER — ROSUVASTATIN CALCIUM 20 MG/1
20 TABLET, COATED ORAL DAILY
Qty: 90 TABLET | Refills: 3 | Status: SHIPPED | OUTPATIENT
Start: 2023-06-15 | End: 2023-12-05 | Stop reason: SDUPTHER

## 2023-06-15 RX ORDER — ATORVASTATIN CALCIUM 10 MG/1
10 TABLET, FILM COATED ORAL NIGHTLY
Qty: 90 TABLET | Refills: 1 | Status: CANCELLED | OUTPATIENT
Start: 2023-06-15 | End: 2024-06-14

## 2023-06-15 NOTE — PROGRESS NOTES
SUBJECTIVE:    Patient ID: Cornelius Couch is a 57 y.o. male.    Chief Complaint: Annual Exam (Went over meds verbally// SW)    This is a 57-year-old male who presents today for annual exam.  Previous patient of Gurvinder Lofton and transitioning over to myself.  Past medical history significant for hyperlipidemia, osteoarthritis, GERD.  Recent labs obtained and here to review. Admits that he has not been real good about the cholesterol medication and remembering to take it.       No visits with results within 6 Month(s) from this visit.   Latest known visit with results is:   Office Visit on 11/29/2022   Component Date Value Ref Range Status    Glucose 06/08/2023 99  65 - 99 mg/dL Final    BUN 06/08/2023 16  7 - 25 mg/dL Final    Creatinine 06/08/2023 0.81  0.70 - 1.30 mg/dL Final    eGFR 06/08/2023 102  > OR = 60 mL/min/1.73m2 Final    BUN/Creatinine Ratio 06/08/2023 NOT APPLICABLE  6 - 22 (calc) Final    Sodium 06/08/2023 142  135 - 146 mmol/L Final    Potassium 06/08/2023 4.3  3.5 - 5.3 mmol/L Final    Chloride 06/08/2023 108  98 - 110 mmol/L Final    CO2 06/08/2023 28  20 - 32 mmol/L Final    Calcium 06/08/2023 9.3  8.6 - 10.3 mg/dL Final    Total Protein 06/08/2023 6.5  6.1 - 8.1 g/dL Final    Albumin 06/08/2023 4.2  3.6 - 5.1 g/dL Final    Globulin, Total 06/08/2023 2.3  1.9 - 3.7 g/dL (calc) Final    Albumin/Globulin Ratio 06/08/2023 1.8  1.0 - 2.5 (calc) Final    Total Bilirubin 06/08/2023 0.5  0.2 - 1.2 mg/dL Final    Alkaline Phosphatase 06/08/2023 53  35 - 144 U/L Final    AST 06/08/2023 15  10 - 35 U/L Final    ALT 06/08/2023 15  9 - 46 U/L Final    Cholesterol 06/08/2023 228 (H)  <200 mg/dL Final    HDL 06/08/2023 57  > OR = 40 mg/dL Final    Triglycerides 06/08/2023 102  <150 mg/dL Final    LDL Cholesterol 06/08/2023 149 (H)  mg/dL (calc) Final    HDL/Cholesterol Ratio 06/08/2023 4.0  <5.0 (calc) Final    Non HDL Chol. (LDL+VLDL) 06/08/2023 171 (H)  <130 mg/dL (calc) Final    WBC 06/08/2023 4.6  3.8 -  10.8 Thousand/uL Final    RBC 06/08/2023 4.53  4.20 - 5.80 Million/uL Final    Hemoglobin 06/08/2023 14.8  13.2 - 17.1 g/dL Final    Hematocrit 06/08/2023 42.7  38.5 - 50.0 % Final    MCV 06/08/2023 94.3  80.0 - 100.0 fL Final    MCH 06/08/2023 32.7  27.0 - 33.0 pg Final    MCHC 06/08/2023 34.7  32.0 - 36.0 g/dL Final    RDW 06/08/2023 12.6  11.0 - 15.0 % Final    Platelets 06/08/2023 279  140 - 400 Thousand/uL Final    MPV 06/08/2023 10.3  7.5 - 12.5 fL Final    Neutrophils, Abs 06/08/2023 2,521  1,500 - 7,800 cells/uL Final    Lymph # 06/08/2023 1,334  850 - 3,900 cells/uL Final    Mono # 06/08/2023 469  200 - 950 cells/uL Final    Eos # 06/08/2023 207  15 - 500 cells/uL Final    Baso # 06/08/2023 69  0 - 200 cells/uL Final    Neutrophils Relative 06/08/2023 54.8  % Final    Lymph % 06/08/2023 29.0  % Final    Mono % 06/08/2023 10.2  % Final    Eosinophil % 06/08/2023 4.5  % Final    Basophil % 06/08/2023 1.5  % Final    Color, UA 06/08/2023 YELLOW  YELLOW Final    Appearance, UA 06/08/2023 CLEAR  CLEAR Final    Specific Gravity, UA 06/08/2023 1.015  1.001 - 1.035 Final    pH, UA 06/08/2023 6.0  5.0 - 8.0 Final    Glucose, UA 06/08/2023 NEGATIVE  NEGATIVE Final    Bilirubin, UA 06/08/2023 NEGATIVE  NEGATIVE Final    Ketones, UA 06/08/2023 NEGATIVE  NEGATIVE Final    Occult Blood UA 06/08/2023 NEGATIVE  NEGATIVE Final    Protein, UA 06/08/2023 NEGATIVE  NEGATIVE Final    Nitrite, UA 06/08/2023 NEGATIVE  NEGATIVE Final    Leukocytes, UA 06/08/2023 NEGATIVE  NEGATIVE Final    WBC Casts, UA 06/08/2023 NONE SEEN  < OR = 5 /HPF Final    RBC Casts, UA 06/08/2023 NONE SEEN  < OR = 2 /HPF Final    Squam Epithel, UA 06/08/2023 NONE SEEN  < OR = 5 /HPF Final    Bacteria, UA 06/08/2023 NONE SEEN  NONE SEEN /HPF Final    Hyaline Casts, UA 06/08/2023 NONE SEEN  NONE SEEN /LPF Final    Service Cmt: 06/08/2023    Final    Reflexive Urine Culture 06/08/2023    Final       Past Medical History:   Diagnosis Date    Arthritis      Colon polyps     Encounter for blood transfusion     GERD (gastroesophageal reflux disease)     PONV (postoperative nausea and vomiting)      Past Surgical History:   Procedure Laterality Date    bone fusion Left 1977    ankle    COLONOSCOPY N/A 11/12/2018    Procedure: COLONOSCOPY;  Surgeon: Car Coats MD;  Location: [facility] ENDO;  Service: Endoscopy;  Laterality: N/A;    COLONOSCOPY N/A 7/12/2021    Procedure: COLONOSCOPY;  Surgeon: Car Coats MD;  Location: [facility] ENDO;  Service: Endoscopy;  Laterality: N/A;    COLONOSCOPY N/A 7/25/2022    Procedure: COLONOSCOPY;  Surgeon: Car Coats MD;  Location: [facility] ENDO;  Service: Endoscopy;  Laterality: N/A;    ESOPHAGOGASTRODUODENOSCOPY N/A 9/16/2021    Procedure: EGD (ESOPHAGOGASTRODUODENOSCOPY);  Surgeon: Car Coats MD;  Location: [facility] ENDO;  Service: Endoscopy;  Laterality: N/A;    FRACTURE SURGERY      finger left pinky,     TONSILLECTOMY       Family History   Problem Relation Age of Onset    Alzheimer's disease Mother     Heart disease Father        Marital Status:   Alcohol History:  reports current alcohol use of about 1.0 standard drink per week.  Tobacco History:  reports that he quit smoking about 14 years ago. His smoking use included cigarettes. He has a 45.00 pack-year smoking history. He has been exposed to tobacco smoke. He has never used smokeless tobacco.  Drug History:  reports current drug use. Drug: Marijuana.    Review of patient's allergies indicates:   Allergen Reactions    Codeine Itching     Anything with codeine    Adhesive Rash       Current Outpatient Medications:     ascorbic acid, vitamin C, (VITAMIN C) 100 MG tablet, Take 100 mg by mouth once daily., Disp: , Rfl:     diclofenac sodium (VOLTAREN) 1 % Gel, Apply 2 g topically 2 (two) times daily., Disp: 300 g, Rfl: 2    meloxicam (MOBIC) 15 MG tablet, Take 1 tablet (15 mg total) by mouth once daily., Disp: 30 tablet, Rfl: 2    vitamin D (VITAMIN D3) 1000 units  "Tab, Take 1,000 Units by mouth once daily., Disp: , Rfl:     rosuvastatin (CRESTOR) 20 MG tablet, Take 1 tablet (20 mg total) by mouth once daily., Disp: 90 tablet, Rfl: 3    Review of Systems   Constitutional:  Negative for activity change, fatigue, fever and unexpected weight change.   HENT:  Negative for congestion.    Respiratory:  Negative for apnea, cough, chest tightness and shortness of breath.    Cardiovascular:  Negative for chest pain and palpitations.   Gastrointestinal:  Negative for abdominal distention and abdominal pain.   Genitourinary:  Negative for difficulty urinating and dysuria.   Musculoskeletal:  Negative for arthralgias and back pain.   Neurological:  Negative for dizziness and weakness.        Objective:      Vitals:    06/15/23 1524   BP: 132/78   Pulse: 88   Weight: 68 kg (150 lb)   Height: 5' 9" (1.753 m)     Physical Exam  Constitutional:       General: He is not in acute distress.     Appearance: He is well-developed.   HENT:      Head: Normocephalic and atraumatic.   Eyes:      Pupils: Pupils are equal, round, and reactive to light.   Neck:      Thyroid: No thyromegaly.   Cardiovascular:      Rate and Rhythm: Normal rate and regular rhythm.      Heart sounds: Normal heart sounds.   Pulmonary:      Effort: Pulmonary effort is normal.      Breath sounds: Normal breath sounds.   Abdominal:      General: Bowel sounds are normal. There is no distension.      Palpations: Abdomen is soft.      Tenderness: There is no abdominal tenderness.   Musculoskeletal:         General: Normal range of motion.      Cervical back: Normal range of motion and neck supple.   Skin:     General: Skin is warm and dry.      Findings: No erythema or rash.   Neurological:      Mental Status: He is alert and oriented to person, place, and time.      Cranial Nerves: No cranial nerve deficit.         Assessment:       1. Gastroesophageal reflux disease, unspecified whether esophagitis present    2. Mixed " hyperlipidemia    3. Routine physical examination         Plan:       Gastroesophageal reflux disease, unspecified whether esophagitis present  Comments:  stable, just maintains off medication. watches the diet and doesnt eat too late.    Mixed hyperlipidemia  Comments:  intolerant of lipitor. agreeable to switch to crestor now. 20mg daily. can take with coq10. recheck. lipid/cmp in 3 mos.  Orders:  -     rosuvastatin (CRESTOR) 20 MG tablet; Take 1 tablet (20 mg total) by mouth once daily.  Dispense: 90 tablet; Refill: 3  -     Comprehensive Metabolic Panel; Future; Expected date: 06/15/2023  -     Lipid Panel; Future; Expected date: 06/15/2023    Routine physical examination  Comments:  labs all look stable aside from cholesterol. will maintain as is. start new rosuvastatin now. coq10 if myalgias.      Follow up in about 6 months (around 12/15/2023).        6/15/2023 Wilner Juarez PA-C

## 2023-10-24 ENCOUNTER — OFFICE VISIT (OUTPATIENT)
Dept: ORTHOPEDICS | Facility: CLINIC | Age: 58
End: 2023-10-24
Payer: COMMERCIAL

## 2023-10-24 DIAGNOSIS — M23.42 LOOSE BODY IN KNEE, LEFT KNEE: ICD-10-CM

## 2023-10-24 DIAGNOSIS — M70.52 INFRAPATELLAR BURSITIS OF LEFT KNEE: Primary | ICD-10-CM

## 2023-10-24 PROCEDURE — 99213 OFFICE O/P EST LOW 20 MIN: CPT | Mod: S$GLB,,, | Performed by: ORTHOPAEDIC SURGERY

## 2023-10-24 PROCEDURE — 1160F RVW MEDS BY RX/DR IN RCRD: CPT | Mod: CPTII,S$GLB,, | Performed by: ORTHOPAEDIC SURGERY

## 2023-10-24 PROCEDURE — 1159F PR MEDICATION LIST DOCUMENTED IN MEDICAL RECORD: ICD-10-PCS | Mod: CPTII,S$GLB,, | Performed by: ORTHOPAEDIC SURGERY

## 2023-10-24 PROCEDURE — 99213 PR OFFICE/OUTPT VISIT, EST, LEVL III, 20-29 MIN: ICD-10-PCS | Mod: S$GLB,,, | Performed by: ORTHOPAEDIC SURGERY

## 2023-10-24 PROCEDURE — 1159F MED LIST DOCD IN RCRD: CPT | Mod: CPTII,S$GLB,, | Performed by: ORTHOPAEDIC SURGERY

## 2023-10-24 PROCEDURE — 1160F PR REVIEW ALL MEDS BY PRESCRIBER/CLIN PHARMACIST DOCUMENTED: ICD-10-PCS | Mod: CPTII,S$GLB,, | Performed by: ORTHOPAEDIC SURGERY

## 2023-10-24 NOTE — PROGRESS NOTES
[facility]    Subjective:     Chief Complaint:   Chief Complaint   Patient presents with    Left Knee - Pain     Left knee pain follow up. Received inj 04/25/23 which offered some relief. States that he is more concerned that 2 times in a week he has had a spontaneous dislocation in his knee. States that the knee corrects itself with no pain       Past Medical History:   Diagnosis Date    Arthritis     Colon polyps     Encounter for blood transfusion     GERD (gastroesophageal reflux disease)     PONV (postoperative nausea and vomiting)        Past Surgical History:   Procedure Laterality Date    bone fusion Left 1977    ankle    COLONOSCOPY N/A 11/12/2018    Procedure: COLONOSCOPY;  Surgeon: Car Coats MD;  Location: [facility] ENDO;  Service: Endoscopy;  Laterality: N/A;    COLONOSCOPY N/A 7/12/2021    Procedure: COLONOSCOPY;  Surgeon: Car Coats MD;  Location: [facility] ENDO;  Service: Endoscopy;  Laterality: N/A;    COLONOSCOPY N/A 7/25/2022    Procedure: COLONOSCOPY;  Surgeon: Car Coats MD;  Location: [facility] ENDO;  Service: Endoscopy;  Laterality: N/A;    ESOPHAGOGASTRODUODENOSCOPY N/A 9/16/2021    Procedure: EGD (ESOPHAGOGASTRODUODENOSCOPY);  Surgeon: Car Coats MD;  Location: [facility] ENDO;  Service: Endoscopy;  Laterality: N/A;    FRACTURE SURGERY      finger left pinky,     TONSILLECTOMY         Current Outpatient Medications   Medication Sig    ascorbic acid, vitamin C, (VITAMIN C) 100 MG tablet Take 100 mg by mouth once daily.    rosuvastatin (CRESTOR) 20 MG tablet Take 1 tablet (20 mg total) by mouth once daily.    vitamin D (VITAMIN D3) 1000 units Tab Take 1,000 Units by mouth once daily.    diclofenac sodium (VOLTAREN) 1 % Gel Apply 2 g topically 2 (two) times daily.     No current facility-administered medications for this visit.       Review of patient's allergies indicates:   Allergen Reactions    Codeine Itching     Anything with codeine    Adhesive Rash       Family History    Problem Relation Age of Onset    Alzheimer's disease Mother     Heart disease Father        Social History     Socioeconomic History    Marital status:    Tobacco Use    Smoking status: Former     Current packs/day: 0.00     Average packs/day: 1.5 packs/day for 30.0 years (45.0 ttl pk-yrs)     Types: Cigarettes     Start date: 1979     Quit date: 2009     Years since quittin.5     Passive exposure: Past    Smokeless tobacco: Never   Substance and Sexual Activity    Alcohol use: Yes     Alcohol/week: 1.0 standard drink of alcohol     Types: 1 Glasses of wine per week     Comment: occ    Drug use: Yes     Types: Marijuana       History of present illness:  Patient comes in today for the left knee.  He continues to have a lot of fluid in the prepatellar bursa.  He also says that he is getting catching in the knee and feels like something is slipping around the knee and poking out on the lateral side.      Review of Systems:    Constitution: Negative for chills, fever, and sweats.  Negative for unexplained weight loss.    HENT:  Negative for headaches and blurry vision.    Cardiovascular:Negative for chest pain or irregular heart beat. Negative for hypertension.    Respiratory:  Negative for cough and shortness of breath.    Gastrointestinal: Negative for abdominal pain, heartburn, melena, nausea, and vomitting.    Genitourinary:  Negative bladder incontinence and dysuria.    Musculoskeletal:  See HPI for details.     Neurological: Negative for numbness.    Psychiatric/Behavioral: Negative for depression.  The patient is not nervous/anxious.      Endocrine: Negative for polyuria    Hematologic/Lymphatic: Negative for bleeding problem.  Does not bruise/bleed easily.    Skin: Negative for poor would healing and rash    Objective:      Physical Examination:    Vital Signs:  There were no vitals filed for this visit.    There is no height or weight on file to calculate BMI.    This a well-developed,  well nourished patient in no acute distress.  They are alert and oriented and cooperative to examination.        Patient has range of motion 0-120 degrees he has a prepatellar bursa which is quite full of fluid.  He also has a 1+ effusion.  Has an equivocal Riley's on the lateral side.  His knee is stable to varus valgus stresses.  Pertinent New Results:    XRAY Report / Interpretation:   AP lateral and sunrise views of the left knee demonstrate very mild osteoarthritis of the left knee with very mild loss of the medial compartment  Assessment/Plan:      Persistent bursitis and probable loose body versus meniscal tear left knee.  I have ordered an MRI of the left knee.  I will see him back with that information      This note was created using Dragon voice recognition software that occasionally misinterpreted phrases or words.

## 2023-11-07 ENCOUNTER — HOSPITAL ENCOUNTER (OUTPATIENT)
Dept: RADIOLOGY | Facility: HOSPITAL | Age: 58
Discharge: HOME | End: 2023-11-07
Attending: ORTHOPAEDIC SURGERY
Payer: COMMERCIAL

## 2023-11-07 DIAGNOSIS — M70.52 INFRAPATELLAR BURSITIS OF LEFT KNEE: ICD-10-CM

## 2023-11-07 DIAGNOSIS — M23.42 LOOSE BODY IN KNEE, LEFT KNEE: ICD-10-CM

## 2023-11-07 PROCEDURE — 73721 MRI JNT OF LWR EXTRE W/O DYE: CPT | Mod: TC,PO,LT

## 2023-11-29 ENCOUNTER — TELEPHONE (OUTPATIENT)
Dept: FAMILY MEDICINE | Facility: CLINIC | Age: 58
End: 2023-11-29

## 2023-11-29 NOTE — TELEPHONE ENCOUNTER
Left message on recording letting pt know we transmitted labs to be done before OV.    Portal message sent letting pt know to have labs done before OV.     CMP and Lipid already ordered to [facility]

## 2023-11-30 ENCOUNTER — OFFICE VISIT (OUTPATIENT)
Dept: ORTHOPEDICS | Facility: CLINIC | Age: 58
End: 2023-11-30
Payer: COMMERCIAL

## 2023-11-30 ENCOUNTER — TELEPHONE (OUTPATIENT)
Dept: FAMILY MEDICINE | Facility: CLINIC | Age: 58
End: 2023-11-30

## 2023-11-30 VITALS — BODY MASS INDEX: 22.22 KG/M2 | WEIGHT: 150 LBS | HEIGHT: 69 IN

## 2023-11-30 DIAGNOSIS — M70.52 INFRAPATELLAR BURSITIS OF LEFT KNEE: ICD-10-CM

## 2023-11-30 DIAGNOSIS — S83.242D TEAR OF MEDIAL MENISCUS OF LEFT KNEE, CURRENT, UNSPECIFIED TEAR TYPE, SUBSEQUENT ENCOUNTER: Primary | ICD-10-CM

## 2023-11-30 PROCEDURE — 1160F RVW MEDS BY RX/DR IN RCRD: CPT | Mod: CPTII,S$GLB,, | Performed by: ORTHOPAEDIC SURGERY

## 2023-11-30 PROCEDURE — 99213 OFFICE O/P EST LOW 20 MIN: CPT | Mod: S$GLB,,, | Performed by: ORTHOPAEDIC SURGERY

## 2023-11-30 PROCEDURE — 3008F PR BODY MASS INDEX (BMI) DOCUMENTED: ICD-10-PCS | Mod: CPTII,S$GLB,, | Performed by: ORTHOPAEDIC SURGERY

## 2023-11-30 PROCEDURE — 1159F PR MEDICATION LIST DOCUMENTED IN MEDICAL RECORD: ICD-10-PCS | Mod: CPTII,S$GLB,, | Performed by: ORTHOPAEDIC SURGERY

## 2023-11-30 PROCEDURE — 3008F BODY MASS INDEX DOCD: CPT | Mod: CPTII,S$GLB,, | Performed by: ORTHOPAEDIC SURGERY

## 2023-11-30 PROCEDURE — 99213 PR OFFICE/OUTPT VISIT, EST, LEVL III, 20-29 MIN: ICD-10-PCS | Mod: S$GLB,,, | Performed by: ORTHOPAEDIC SURGERY

## 2023-11-30 PROCEDURE — 1160F PR REVIEW ALL MEDS BY PRESCRIBER/CLIN PHARMACIST DOCUMENTED: ICD-10-PCS | Mod: CPTII,S$GLB,, | Performed by: ORTHOPAEDIC SURGERY

## 2023-11-30 PROCEDURE — 1159F MED LIST DOCD IN RCRD: CPT | Mod: CPTII,S$GLB,, | Performed by: ORTHOPAEDIC SURGERY

## 2023-11-30 NOTE — PROGRESS NOTES
[facility]    Subjective:     Chief Complaint:   Chief Complaint   Patient presents with    Left Knee - Pain     Patient is here for MRI results of his Left knee, states his pain is worse than his last visit, hasn't popped out of place again.        Past Medical History:   Diagnosis Date    Arthritis     Colon polyps     Encounter for blood transfusion     GERD (gastroesophageal reflux disease)     PONV (postoperative nausea and vomiting)        Past Surgical History:   Procedure Laterality Date    bone fusion Left 1977    ankle    COLONOSCOPY N/A 11/12/2018    Procedure: COLONOSCOPY;  Surgeon: Car Coats MD;  Location: [facility] ENDO;  Service: Endoscopy;  Laterality: N/A;    COLONOSCOPY N/A 7/12/2021    Procedure: COLONOSCOPY;  Surgeon: Car Coats MD;  Location: [facility] ENDO;  Service: Endoscopy;  Laterality: N/A;    COLONOSCOPY N/A 7/25/2022    Procedure: COLONOSCOPY;  Surgeon: Car Coats MD;  Location: [facility] ENDO;  Service: Endoscopy;  Laterality: N/A;    ESOPHAGOGASTRODUODENOSCOPY N/A 9/16/2021    Procedure: EGD (ESOPHAGOGASTRODUODENOSCOPY);  Surgeon: Car Coats MD;  Location: [facility] ENDO;  Service: Endoscopy;  Laterality: N/A;    FRACTURE SURGERY      finger left pinky,     TONSILLECTOMY         Current Outpatient Medications   Medication Sig    ascorbic acid, vitamin C, (VITAMIN C) 100 MG tablet Take 100 mg by mouth once daily.    diclofenac sodium (VOLTAREN) 1 % Gel Apply 2 g topically 2 (two) times daily.    rosuvastatin (CRESTOR) 20 MG tablet Take 1 tablet (20 mg total) by mouth once daily.    vitamin D (VITAMIN D3) 1000 units Tab Take 1,000 Units by mouth once daily.     No current facility-administered medications for this visit.       Review of patient's allergies indicates:   Allergen Reactions    Codeine Itching     Anything with codeine    Adhesive Rash       Family History   Problem Relation Age of Onset    Alzheimer's disease Mother     Heart disease Father         Social History     Socioeconomic History    Marital status:    Tobacco Use    Smoking status: Former     Current packs/day: 0.00     Average packs/day: 1.5 packs/day for 30.0 years (45.0 ttl pk-yrs)     Types: Cigarettes     Start date: 1979     Quit date: 2009     Years since quittin.6     Passive exposure: Past    Smokeless tobacco: Never   Substance and Sexual Activity    Alcohol use: Yes     Alcohol/week: 1.0 standard drink of alcohol     Types: 1 Glasses of wine per week     Comment: occ    Drug use: Yes     Types: Marijuana       History of present illness:  Patient returns today for the left knee.  Continues complain of left knee pain and instability.      Review of Systems:    Constitution: Negative for chills, fever, and sweats.  Negative for unexplained weight loss.    HENT:  Negative for headaches and blurry vision.    Cardiovascular:Negative for chest pain or irregular heart beat. Negative for hypertension.    Respiratory:  Negative for cough and shortness of breath.    Gastrointestinal: Negative for abdominal pain, heartburn, melena, nausea, and vomitting.    Genitourinary:  Negative bladder incontinence and dysuria.    Musculoskeletal:  See HPI for details.     Neurological: Negative for numbness.    Psychiatric/Behavioral: Negative for depression.  The patient is not nervous/anxious.      Endocrine: Negative for polyuria    Hematologic/Lymphatic: Negative for bleeding problem.  Does not bruise/bleed easily.    Skin: Negative for poor would healing and rash    Objective:      Physical Examination:    Vital Signs:  There were no vitals filed for this visit.    Body mass index is 22.15 kg/m².    This a well-developed, well nourished patient in no acute distress.  They are alert and oriented and cooperative to examination.        Patient appears to be stated age.  He is going to trace effusion on the left knee range of motion 0-130 degrees he is a lot of medial joint line  tenderness equivocal Riley's negative anterior drawer negative posterior drawer  Pertinent New Results:  MRI of the left knee demonstrates a medial meniscal tear.  Posterior horn.  XRAY Report / Interpretation:       Assessment/Plan:      Posterior horn medial meniscal tear with pain and instability.  I have offered him arthroscopy partial medial meniscectomy the risks and benefits discussed in great detail.  He understood he wishes to proceed      This note was created using Dragon voice recognition software that occasionally misinterpreted phrases or words.

## 2023-11-30 NOTE — H&P (VIEW-ONLY)
[facility]    Subjective:     Chief Complaint:   Chief Complaint   Patient presents with    Left Knee - Pain     Patient is here for MRI results of his Left knee, states his pain is worse than his last visit, hasn't popped out of place again.        Past Medical History:   Diagnosis Date    Arthritis     Colon polyps     Encounter for blood transfusion     GERD (gastroesophageal reflux disease)     PONV (postoperative nausea and vomiting)        Past Surgical History:   Procedure Laterality Date    bone fusion Left 1977    ankle    COLONOSCOPY N/A 11/12/2018    Procedure: COLONOSCOPY;  Surgeon: Car Coats MD;  Location: [facility] ENDO;  Service: Endoscopy;  Laterality: N/A;    COLONOSCOPY N/A 7/12/2021    Procedure: COLONOSCOPY;  Surgeon: Car Coats MD;  Location: [facility] ENDO;  Service: Endoscopy;  Laterality: N/A;    COLONOSCOPY N/A 7/25/2022    Procedure: COLONOSCOPY;  Surgeon: Car Coats MD;  Location: [facility] ENDO;  Service: Endoscopy;  Laterality: N/A;    ESOPHAGOGASTRODUODENOSCOPY N/A 9/16/2021    Procedure: EGD (ESOPHAGOGASTRODUODENOSCOPY);  Surgeon: Car Coats MD;  Location: [facility] ENDO;  Service: Endoscopy;  Laterality: N/A;    FRACTURE SURGERY      finger left pinky,     TONSILLECTOMY         Current Outpatient Medications   Medication Sig    ascorbic acid, vitamin C, (VITAMIN C) 100 MG tablet Take 100 mg by mouth once daily.    diclofenac sodium (VOLTAREN) 1 % Gel Apply 2 g topically 2 (two) times daily.    rosuvastatin (CRESTOR) 20 MG tablet Take 1 tablet (20 mg total) by mouth once daily.    vitamin D (VITAMIN D3) 1000 units Tab Take 1,000 Units by mouth once daily.     No current facility-administered medications for this visit.       Review of patient's allergies indicates:   Allergen Reactions    Codeine Itching     Anything with codeine    Adhesive Rash       Family History   Problem Relation Age of Onset    Alzheimer's disease Mother     Heart disease Father         Social History     Socioeconomic History    Marital status:    Tobacco Use    Smoking status: Former     Current packs/day: 0.00     Average packs/day: 1.5 packs/day for 30.0 years (45.0 ttl pk-yrs)     Types: Cigarettes     Start date: 1979     Quit date: 2009     Years since quittin.6     Passive exposure: Past    Smokeless tobacco: Never   Substance and Sexual Activity    Alcohol use: Yes     Alcohol/week: 1.0 standard drink of alcohol     Types: 1 Glasses of wine per week     Comment: occ    Drug use: Yes     Types: Marijuana       History of present illness:  Patient returns today for the left knee.  Continues complain of left knee pain and instability.      Review of Systems:    Constitution: Negative for chills, fever, and sweats.  Negative for unexplained weight loss.    HENT:  Negative for headaches and blurry vision.    Cardiovascular:Negative for chest pain or irregular heart beat. Negative for hypertension.    Respiratory:  Negative for cough and shortness of breath.    Gastrointestinal: Negative for abdominal pain, heartburn, melena, nausea, and vomitting.    Genitourinary:  Negative bladder incontinence and dysuria.    Musculoskeletal:  See HPI for details.     Neurological: Negative for numbness.    Psychiatric/Behavioral: Negative for depression.  The patient is not nervous/anxious.      Endocrine: Negative for polyuria    Hematologic/Lymphatic: Negative for bleeding problem.  Does not bruise/bleed easily.    Skin: Negative for poor would healing and rash    Objective:      Physical Examination:    Vital Signs:  There were no vitals filed for this visit.    Body mass index is 22.15 kg/m².    This a well-developed, well nourished patient in no acute distress.  They are alert and oriented and cooperative to examination.        Patient appears to be stated age.  He is going to trace effusion on the left knee range of motion 0-130 degrees he is a lot of medial joint line  tenderness equivocal Riley's negative anterior drawer negative posterior drawer  Pertinent New Results:  MRI of the left knee demonstrates a medial meniscal tear.  Posterior horn.  XRAY Report / Interpretation:       Assessment/Plan:      Posterior horn medial meniscal tear with pain and instability.  I have offered him arthroscopy partial medial meniscectomy the risks and benefits discussed in great detail.  He understood he wishes to proceed      This note was created using Dragon voice recognition software that occasionally misinterpreted phrases or words.

## 2023-11-30 NOTE — TELEPHONE ENCOUNTER
----- Message from Oriana West sent at 11/30/2023  8:30 AM CST -----  Pt needs to reschedule his appointment due to surgery on 12/15 (same day).  [phone]

## 2023-11-30 NOTE — TELEPHONE ENCOUNTER
Left detailed message informing pt of earliest available appt. Scheduled him for 1/11/2024 @12 pm. It that does not work please give us a call back to reschedule.

## 2023-12-01 ENCOUNTER — TELEPHONE (OUTPATIENT)
Dept: FAMILY MEDICINE | Facility: CLINIC | Age: 58
End: 2023-12-01

## 2023-12-01 LAB
ALBUMIN SERPL-MCNC: 4.1 G/DL (ref 3.6–5.1)
ALBUMIN/GLOB SERPL: 2 (CALC) (ref 1–2.5)
ALP SERPL-CCNC: 57 U/L (ref 35–144)
ALT SERPL-CCNC: 13 U/L (ref 9–46)
AST SERPL-CCNC: 14 U/L (ref 10–35)
BILIRUB SERPL-MCNC: 0.4 MG/DL (ref 0.2–1.2)
BUN SERPL-MCNC: 13 MG/DL (ref 7–25)
BUN/CREAT SERPL: NORMAL (CALC) (ref 6–22)
CALCIUM SERPL-MCNC: 9 MG/DL (ref 8.6–10.3)
CHLORIDE SERPL-SCNC: 108 MMOL/L (ref 98–110)
CHOLEST SERPL-MCNC: 237 MG/DL
CHOLEST/HDLC SERPL: 4.2 (CALC)
CO2 SERPL-SCNC: 28 MMOL/L (ref 20–32)
CREAT SERPL-MCNC: 0.88 MG/DL (ref 0.7–1.3)
EGFR: 99 ML/MIN/1.73M2
GLOBULIN SER CALC-MCNC: 2.1 G/DL (CALC) (ref 1.9–3.7)
GLUCOSE SERPL-MCNC: 84 MG/DL (ref 65–99)
HDLC SERPL-MCNC: 56 MG/DL
LDLC SERPL CALC-MCNC: 164 MG/DL (CALC)
NONHDLC SERPL-MCNC: 181 MG/DL (CALC)
POTASSIUM SERPL-SCNC: 4.2 MMOL/L (ref 3.5–5.3)
PROT SERPL-MCNC: 6.2 G/DL (ref 6.1–8.1)
SODIUM SERPL-SCNC: 141 MMOL/L (ref 135–146)
TRIGL SERPL-MCNC: 71 MG/DL

## 2023-12-01 NOTE — TELEPHONE ENCOUNTER
----- Message from Salma Galan sent at 12/1/2023  9:28 AM CST -----  The patient is returning a call from yesterday about rescheduling  an appointment with Wilner. His appointment was rescheduled to 01/11/2024. He wanted to know could he get in anytime in December? He will have new insurance in January. He is not sure what it will be. Pt's # [phone]

## 2023-12-05 ENCOUNTER — TELEPHONE (OUTPATIENT)
Dept: ORTHOPEDICS | Facility: CLINIC | Age: 58
End: 2023-12-05

## 2023-12-05 ENCOUNTER — OFFICE VISIT (OUTPATIENT)
Dept: FAMILY MEDICINE | Facility: CLINIC | Age: 58
End: 2023-12-05
Payer: COMMERCIAL

## 2023-12-05 ENCOUNTER — PATIENT MESSAGE (OUTPATIENT)
Dept: ORTHOPEDICS | Facility: CLINIC | Age: 58
End: 2023-12-05

## 2023-12-05 VITALS
DIASTOLIC BLOOD PRESSURE: 88 MMHG | BODY MASS INDEX: 24.29 KG/M2 | HEIGHT: 69 IN | WEIGHT: 164 LBS | SYSTOLIC BLOOD PRESSURE: 136 MMHG | HEART RATE: 60 BPM

## 2023-12-05 DIAGNOSIS — E78.2 MIXED HYPERLIPIDEMIA: Primary | ICD-10-CM

## 2023-12-05 DIAGNOSIS — Z82.49 FAMILY HISTORY OF EARLY CAD: ICD-10-CM

## 2023-12-05 DIAGNOSIS — Z87.891 FORMER SMOKER: ICD-10-CM

## 2023-12-05 DIAGNOSIS — R35.0 BENIGN PROSTATIC HYPERPLASIA WITH URINARY FREQUENCY: ICD-10-CM

## 2023-12-05 DIAGNOSIS — N40.1 BENIGN PROSTATIC HYPERPLASIA WITH URINARY FREQUENCY: ICD-10-CM

## 2023-12-05 PROCEDURE — 3008F PR BODY MASS INDEX (BMI) DOCUMENTED: ICD-10-PCS | Mod: CPTII,S$GLB,, | Performed by: PHYSICIAN ASSISTANT

## 2023-12-05 PROCEDURE — 3079F DIAST BP 80-89 MM HG: CPT | Mod: CPTII,S$GLB,, | Performed by: PHYSICIAN ASSISTANT

## 2023-12-05 PROCEDURE — 3079F PR MOST RECENT DIASTOLIC BLOOD PRESSURE 80-89 MM HG: ICD-10-PCS | Mod: CPTII,S$GLB,, | Performed by: PHYSICIAN ASSISTANT

## 2023-12-05 PROCEDURE — 3008F BODY MASS INDEX DOCD: CPT | Mod: CPTII,S$GLB,, | Performed by: PHYSICIAN ASSISTANT

## 2023-12-05 PROCEDURE — 3075F PR MOST RECENT SYSTOLIC BLOOD PRESS GE 130-139MM HG: ICD-10-PCS | Mod: CPTII,S$GLB,, | Performed by: PHYSICIAN ASSISTANT

## 2023-12-05 PROCEDURE — 99214 PR OFFICE/OUTPT VISIT, EST, LEVL IV, 30-39 MIN: ICD-10-PCS | Mod: S$GLB,,, | Performed by: PHYSICIAN ASSISTANT

## 2023-12-05 PROCEDURE — 3075F SYST BP GE 130 - 139MM HG: CPT | Mod: CPTII,S$GLB,, | Performed by: PHYSICIAN ASSISTANT

## 2023-12-05 PROCEDURE — 99214 OFFICE O/P EST MOD 30 MIN: CPT | Mod: S$GLB,,, | Performed by: PHYSICIAN ASSISTANT

## 2023-12-05 RX ORDER — ROSUVASTATIN CALCIUM 20 MG/1
20 TABLET, COATED ORAL DAILY
Qty: 90 TABLET | Refills: 3 | Status: SHIPPED | OUTPATIENT
Start: 2023-12-05 | End: 2024-12-04

## 2023-12-05 RX ORDER — DUTASTERIDE 0.5 MG/1
0.5 CAPSULE, LIQUID FILLED ORAL DAILY
Qty: 30 CAPSULE | Refills: 11 | Status: SHIPPED | OUTPATIENT
Start: 2023-12-05 | End: 2024-12-04

## 2023-12-05 NOTE — TELEPHONE ENCOUNTER
----- Message from Michelle Alexander sent at 12/5/2023  8:27 AM CST -----  [phone]-please call to schedule surgery-he has questions

## 2023-12-05 NOTE — PROGRESS NOTES
"  SUBJECTIVE:    Patient ID: Cornelius Couch is a 58 y.o. male.    Chief Complaint: Follow-up (No bottles, review Lab-results, declined flu vaccine, abc )    Cornelius Couch is a 58 y.o. male PMHx HLD, GERD, osteoarthritis presenting for a follow up visit. He reports he has been doing well. Still working as a  for his company with his son. Sees Dr. Ochoa for knee pain that was recently diagnosed as a mensicus tear. Will be having a meniscectomy on 12/15. Cholesterol is still elevated on recent lab work. Patient admits to not taking his crestor regularly. Denies side effects. States his diet is good but does eat out a lot. Expresses interest in wanting to exercise more after knee surgery. Reports having some "chest tightness" with exertion that resolves in a few minutes with rest after he catches his breath. He believes it is due to being out of shape. Denies chest pain at rest, n/v, headaches.   He also reports intermittent dysuria and urinary frequency that has been going on for years. Was told before it was due to prostate enlargement and prescribed flomax, but cannot take it due to side effects.   Complains of rhinorrhea w/ traces of blood almost everyday for the past month since recent trip to [place].      Office Visit on 06/15/2023   Component Date Value Ref Range Status    Glucose 11/30/2023 84  65 - 99 mg/dL Final    BUN 11/30/2023 13  7 - 25 mg/dL Final    Creatinine 11/30/2023 0.88  0.70 - 1.30 mg/dL Final    eGFR 11/30/2023 99  > OR = 60 mL/min/1.73m2 Final    BUN/Creatinine Ratio 11/30/2023 SEE NOTE:  6 - 22 (calc) Final    Sodium 11/30/2023 141  135 - 146 mmol/L Final    Potassium 11/30/2023 4.2  3.5 - 5.3 mmol/L Final    Chloride 11/30/2023 108  98 - 110 mmol/L Final    CO2 11/30/2023 28  20 - 32 mmol/L Final    Calcium 11/30/2023 9.0  8.6 - 10.3 mg/dL Final    Total Protein 11/30/2023 6.2  6.1 - 8.1 g/dL Final    Albumin 11/30/2023 4.1  3.6 - 5.1 g/dL Final    Globulin, Total 11/30/2023 " 2.1  1.9 - 3.7 g/dL (calc) Final    Albumin/Globulin Ratio 11/30/2023 2.0  1.0 - 2.5 (calc) Final    Total Bilirubin 11/30/2023 0.4  0.2 - 1.2 mg/dL Final    Alkaline Phosphatase 11/30/2023 57  35 - 144 U/L Final    AST 11/30/2023 14  10 - 35 U/L Final    ALT 11/30/2023 13  9 - 46 U/L Final    Cholesterol 11/30/2023 237 (H)  <200 mg/dL Final    HDL 11/30/2023 56  > OR = 40 mg/dL Final    Triglycerides 11/30/2023 71  <150 mg/dL Final    LDL Cholesterol 11/30/2023 164 (H)  mg/dL (calc) Final    HDL/Cholesterol Ratio 11/30/2023 4.2  <5.0 (calc) Final    Non HDL Chol. (LDL+VLDL) 11/30/2023 181 (H)  <130 mg/dL (calc) Final       Past Medical History:   Diagnosis Date    Arthritis     Colon polyps     Encounter for blood transfusion     GERD (gastroesophageal reflux disease)     PONV (postoperative nausea and vomiting)      Past Surgical History:   Procedure Laterality Date    bone fusion Left 1977    ankle    COLONOSCOPY N/A 11/12/2018    Procedure: COLONOSCOPY;  Surgeon: Car Coats MD;  Location: [facility];  Service: Endoscopy;  Laterality: N/A;    COLONOSCOPY N/A 7/12/2021    Procedure: COLONOSCOPY;  Surgeon: Car Coats MD;  Location: [facility];  Service: Endoscopy;  Laterality: N/A;    COLONOSCOPY N/A 7/25/2022    Procedure: COLONOSCOPY;  Surgeon: Car Coats MD;  Location: [facility];  Service: Endoscopy;  Laterality: N/A;    ESOPHAGOGASTRODUODENOSCOPY N/A 9/16/2021    Procedure: EGD (ESOPHAGOGASTRODUODENOSCOPY);  Surgeon: Car Coats MD;  Location: [facility];  Service: Endoscopy;  Laterality: N/A;    FRACTURE SURGERY      finger left pinky,     TONSILLECTOMY       Family History   Problem Relation Age of Onset    Alzheimer's disease Mother     Heart disease Father        Marital Status:   Alcohol History:  reports current alcohol use of about 1.0 standard drink of alcohol per week.  Tobacco History:  reports that he quit smoking about 14 years ago. His smoking use included cigarettes.  "He started smoking about 44 years ago. He has a 45.0 pack-year smoking history. He has been exposed to tobacco smoke. He has never used smokeless tobacco.  Drug History:  reports current drug use. Drug: Marijuana.    Review of patient's allergies indicates:   Allergen Reactions    Codeine Itching     Anything with codeine    Adhesive Rash       Current Outpatient Medications:     ascorbic acid, vitamin C, (VITAMIN C) 100 MG tablet, Take 100 mg by mouth once daily., Disp: , Rfl:     vitamin D (VITAMIN D3) 1000 units Tab, Take 1,000 Units by mouth once daily., Disp: , Rfl:     diclofenac sodium (VOLTAREN) 1 % Gel, Apply 2 g topically 2 (two) times daily., Disp: 300 g, Rfl: 2    dutasteride (AVODART) 0.5 mg capsule, Take 1 capsule (0.5 mg total) by mouth once daily., Disp: 30 capsule, Rfl: 11    rosuvastatin (CRESTOR) 20 MG tablet, Take 1 tablet (20 mg total) by mouth once daily., Disp: 90 tablet, Rfl: 3    Review of Systems   Constitutional:  Negative for chills and fatigue.   HENT:  Positive for rhinorrhea. Negative for congestion and sore throat.    Eyes:  Negative for discharge and redness.   Respiratory:  Positive for chest tightness. Negative for cough and shortness of breath.    Cardiovascular:  Negative for chest pain, palpitations and leg swelling.   Gastrointestinal:  Negative for abdominal pain, constipation and diarrhea.   Genitourinary:  Positive for dysuria and frequency.   Musculoskeletal:  Positive for arthralgias. Negative for back pain.   Skin:  Negative for color change.   Neurological: Negative.  Negative for dizziness and headaches.   Psychiatric/Behavioral: Negative.  Negative for confusion.           Objective:      Vitals:    12/05/23 0709   BP: 136/88   Pulse: 60   Weight: 74.4 kg (164 lb)   Height: 5' 9" (1.753 m)     Physical Exam  Constitutional:       General: He is not in acute distress.     Appearance: He is not toxic-appearing.   HENT:      Head: Normocephalic and atraumatic.      Right " Ear: Tympanic membrane, ear canal and external ear normal.      Left Ear: Tympanic membrane, ear canal and external ear normal.      Nose: Nose normal.      Mouth/Throat:      Mouth: Mucous membranes are moist.      Pharynx: Oropharynx is clear.   Eyes:      Extraocular Movements: Extraocular movements intact.      Conjunctiva/sclera: Conjunctivae normal.      Pupils: Pupils are equal, round, and reactive to light.   Cardiovascular:      Rate and Rhythm: Normal rate and regular rhythm.      Pulses: Normal pulses.      Heart sounds: Normal heart sounds.   Pulmonary:      Effort: Pulmonary effort is normal. No respiratory distress.      Breath sounds: Normal breath sounds. No stridor. No wheezing, rhonchi or rales.   Abdominal:      General: There is no distension.      Palpations: Abdomen is soft.   Musculoskeletal:         General: Normal range of motion.      Cervical back: Normal range of motion and neck supple.      Right lower leg: No edema.      Left lower leg: No edema.   Skin:     General: Skin is warm and dry.      Capillary Refill: Capillary refill takes less than 2 seconds.      Coloration: Skin is not pale.   Neurological:      General: No focal deficit present.      Mental Status: He is alert and oriented to person, place, and time.           Assessment:       1. Mixed hyperlipidemia    2. Family history of early CAD    3. Benign prostatic hyperplasia with urinary frequency    4. Former smoker         Plan:       Mixed hyperlipidemia  Comments:  Discussed with patient the importance of taking his medication to lower his cholestrol. Patient stated he will start taking it regularly. Will continue as is.  Orders:  -     Comprehensive Metabolic Panel; Future; Expected date: 12/05/2023  -     Lipid Panel; Future; Expected date: 12/05/2023  -     rosuvastatin (CRESTOR) 20 MG tablet; Take 1 tablet (20 mg total) by mouth once daily.  Dispense: 90 tablet; Refill: 3    Family history of early  CAD  Comments:  Father with MI in 60s, occasional SOB. HLD. will check stress now to stratify  Orders:  -     Exercise Stress - EKG; Future    Benign prostatic hyperplasia with urinary frequency  Comments:  avodart to start now. will f/u in 3-4 mos  Orders:  -     dutasteride (AVODART) 0.5 mg capsule; Take 1 capsule (0.5 mg total) by mouth once daily.  Dispense: 30 capsule; Refill: 11    Former smoker  -     CT Chest Lung Screening Low Dose; Future; Expected date: 12/05/2023      Follow up in about 4 months (around 4/5/2024).        12/5/2023 Wilner Juarez PA-C

## 2023-12-07 ENCOUNTER — TELEPHONE (OUTPATIENT)
Dept: ORTHOPEDICS | Facility: CLINIC | Age: 58
End: 2023-12-07

## 2023-12-07 DIAGNOSIS — S83.242D TEAR OF MEDIAL MENISCUS OF LEFT KNEE, CURRENT, UNSPECIFIED TEAR TYPE, SUBSEQUENT ENCOUNTER: Primary | ICD-10-CM

## 2023-12-07 RX ORDER — CEFAZOLIN SODIUM 2 G/50ML
2 SOLUTION INTRAVENOUS
Status: CANCELLED | OUTPATIENT
Start: 2023-12-07

## 2023-12-07 NOTE — TELEPHONE ENCOUNTER
----- Message from Fuentes Arita sent at 12/7/2023  3:30 PM CST -----  Pt would like a call back

## 2023-12-11 ENCOUNTER — HOSPITAL ENCOUNTER (OUTPATIENT)
Dept: RADIOLOGY | Facility: HOSPITAL | Age: 58
Discharge: HOME | End: 2023-12-11
Attending: PHYSICIAN ASSISTANT
Payer: COMMERCIAL

## 2023-12-11 DIAGNOSIS — Z87.891 FORMER SMOKER: ICD-10-CM

## 2023-12-11 PROCEDURE — 71271 CT THORAX LUNG CANCER SCR C-: CPT | Mod: TC,PO

## 2023-12-12 ENCOUNTER — PATIENT MESSAGE (OUTPATIENT)
Dept: FAMILY MEDICINE | Facility: CLINIC | Age: 58
End: 2023-12-12

## 2023-12-12 ENCOUNTER — CLINICAL SUPPORT (OUTPATIENT)
Dept: FAMILY MEDICINE | Facility: CLINIC | Age: 58
End: 2023-12-12
Payer: COMMERCIAL

## 2023-12-12 ENCOUNTER — HOSPITAL ENCOUNTER (OUTPATIENT)
Dept: PREADMISSION TESTING | Facility: HOSPITAL | Age: 58
Discharge: HOME | End: 2023-12-12
Attending: ORTHOPAEDIC SURGERY
Payer: COMMERCIAL

## 2023-12-12 VITALS
TEMPERATURE: 98 F | HEART RATE: 75 BPM | OXYGEN SATURATION: 98 % | SYSTOLIC BLOOD PRESSURE: 165 MMHG | BODY MASS INDEX: 24.29 KG/M2 | WEIGHT: 164 LBS | RESPIRATION RATE: 16 BRPM | DIASTOLIC BLOOD PRESSURE: 88 MMHG | HEIGHT: 69 IN

## 2023-12-12 DIAGNOSIS — Z01.818 PRE-OP TESTING: Primary | ICD-10-CM

## 2023-12-12 DIAGNOSIS — S83.242D TEAR OF MEDIAL MENISCUS OF LEFT KNEE, CURRENT, UNSPECIFIED TEAR TYPE, SUBSEQUENT ENCOUNTER: ICD-10-CM

## 2023-12-12 DIAGNOSIS — Z23 NEED FOR SHINGLES VACCINE: Primary | ICD-10-CM

## 2023-12-12 DIAGNOSIS — R21 RASH: Primary | ICD-10-CM

## 2023-12-12 PROCEDURE — 93010 EKG 12-LEAD: ICD-10-PCS | Mod: ,,, | Performed by: GENERAL PRACTICE

## 2023-12-12 PROCEDURE — 93010 ELECTROCARDIOGRAM REPORT: CPT | Mod: ,,, | Performed by: GENERAL PRACTICE

## 2023-12-12 PROCEDURE — 93005 ELECTROCARDIOGRAM TRACING: CPT | Performed by: GENERAL PRACTICE

## 2023-12-12 RX ORDER — VALACYCLOVIR HYDROCHLORIDE 1 G/1
1000 TABLET, FILM COATED ORAL EVERY 8 HOURS
Qty: 21 TABLET | Refills: 0 | Status: SHIPPED | OUTPATIENT
Start: 2023-12-12 | End: 2023-12-26

## 2023-12-12 NOTE — PRE-PROCEDURE INSTRUCTIONS
"History and medicines reviewed with patient. EKG completed in PAT office. Rash noted to left lateral upper trunk near axillary and reports itches at times and feels "achy" at times as well. Suspicious for shingles; picture sent with patients phone to PCP Wilner Mcleod PA-C. Message also sent to Elida at Dr Mccracken office. Did not send patient to lab due to possibility of a reschedule.  "

## 2023-12-12 NOTE — DISCHARGE INSTRUCTIONS
To confirm, Your doctor has instructed you that surgery is scheduled for:   Wednesday, December 20, 2023    Pre-Op will call the afternoon prior to surgery between 4:00 and 6:00 PM with the final arrival time.    Tuesday, December 19, 2023    Please report to [facility] via [facility] entrance. Check in at registration desk.    Do not eat or drink anything after midnight the night before your surgery - THIS INCLUDES  WATER, GUM, MINTS AND CANDY.  YOU MAY BRUSH YOUR TEETH BUT DO NOT SWALLOW       PLEASE NOTE:  The surgery schedule has many variables which may affect the time of your surgery case.  Family members should be available if your surgery time changes.  Plan to be here the day of your procedure between 4-6 hours.      DO NOT TAKE THESE MEDICATIONS 5-7 DAYS PRIOR to your procedure or per your surgeon's request: ASPIRIN, ALEVE, ADVIL, IBUPROFEN,  ROBERTO CARLOS SELTZER, BC , FISH OIL , VITAMIN E, HERBALS  (May take Tylenol)                                                      IMPORTANT INSTRUCTIONS    Do not smoke, vape or drink alcoholic beverages 24 hours prior to your procedure.  Shower the night before AND the morning of your procedure with a Chlorhexidine wash such as Hibiclens or Dial antibacterial soap from the neck down. Do not apply any deodorants, lotions or powders after each shower.  Do not get it on your face or in your eyes.  You may use your own shampoo and face wash. This helps your skin to be as bacteria free as possible.  DO NOT remove hair from the surgery site.  Do not shave the incision site unless you are given specific instructions to do so.    Sleep in a bed with clean sheets.  Do not sleep with a pet in the bed.     Please leave all jewelry, piercing's and valuables at home.     Make arrangements in advance for transportation home by a responsible adult.    You must make arrangements for transportation, TAXI'S, UBER'S OR LYFTS ARE NOT ALLOWED.      If you have any questions about these  instructions, call Pre-Op Admit  Nursing at [phone] or the Pre-Op Day Surgery Unit at [phone].

## 2023-12-14 ENCOUNTER — TELEPHONE (OUTPATIENT)
Dept: ORTHOPEDICS | Facility: CLINIC | Age: 58
End: 2023-12-14

## 2023-12-14 NOTE — TELEPHONE ENCOUNTER
----- Message from Michelle Alexander sent at 12/13/2023 11:07 AM CST -----  [phone]-he is schedules for surgery on 12/20 and he has a slight case of shingles, will  that effect anything

## 2023-12-14 NOTE — CARE UPDATE
Dr Zazueta notified that patient currently has shingles. Patient needs treatment and past contagious stage before surgery, message sent to Dr Ochoa's office.

## 2023-12-18 NOTE — CARE UPDATE
"Spoke with Dr Madrid, will check on surgery with shiingles. Called and spoke with pt. Did have treatment with Valtrex, appears to be "red blotch" secure chat sent to Elida about reschedule surgery per Dr Zazueta last week . Awaiting follow up    "

## 2023-12-19 ENCOUNTER — PATIENT MESSAGE (OUTPATIENT)
Dept: ORTHOPEDICS | Facility: CLINIC | Age: 58
End: 2023-12-19

## 2023-12-26 ENCOUNTER — APPOINTMENT (OUTPATIENT)
Dept: LAB | Facility: HOSPITAL | Age: 58
End: 2023-12-26
Attending: ORTHOPAEDIC SURGERY
Payer: COMMERCIAL

## 2023-12-26 DIAGNOSIS — S83.242D TEAR OF MEDIAL MENISCUS OF LEFT KNEE, CURRENT, UNSPECIFIED TEAR TYPE, SUBSEQUENT ENCOUNTER: Primary | ICD-10-CM

## 2023-12-26 PROCEDURE — 80053 COMPREHEN METABOLIC PANEL: CPT | Performed by: ORTHOPAEDIC SURGERY

## 2023-12-26 PROCEDURE — 36415 COLL VENOUS BLD VENIPUNCTURE: CPT | Performed by: ORTHOPAEDIC SURGERY

## 2023-12-26 PROCEDURE — 85025 COMPLETE CBC W/AUTO DIFF WBC: CPT | Performed by: ORTHOPAEDIC SURGERY

## 2023-12-26 RX ORDER — OXYCODONE AND ACETAMINOPHEN 7.5; 325 MG/1; MG/1
1 TABLET ORAL EVERY 6 HOURS PRN
Qty: 28 TABLET | Refills: 0 | Status: SHIPPED | OUTPATIENT
Start: 2023-12-26

## 2023-12-26 NOTE — CARE UPDATE
Call placed to pt, states everything is scabbed over, no open areas noted. Review of meds/health history and preop education. Pt has AVS printed from last preop and has chlorhexidine soap. Pt voiced understanding

## 2023-12-27 ENCOUNTER — ANESTHESIA (OUTPATIENT)
Dept: SURGERY | Facility: HOSPITAL | Age: 58
End: 2023-12-27
Payer: COMMERCIAL

## 2023-12-27 ENCOUNTER — ANESTHESIA EVENT (OUTPATIENT)
Dept: SURGERY | Facility: HOSPITAL | Age: 58
End: 2023-12-27
Payer: COMMERCIAL

## 2023-12-27 ENCOUNTER — HOSPITAL ENCOUNTER (OUTPATIENT)
Facility: HOSPITAL | Age: 58
Discharge: HOME | End: 2023-12-27
Attending: ORTHOPAEDIC SURGERY | Admitting: ORTHOPAEDIC SURGERY
Payer: COMMERCIAL

## 2023-12-27 VITALS
WEIGHT: 165 LBS | SYSTOLIC BLOOD PRESSURE: 131 MMHG | OXYGEN SATURATION: 100 % | RESPIRATION RATE: 15 BRPM | HEIGHT: 69 IN | HEART RATE: 61 BPM | BODY MASS INDEX: 24.44 KG/M2 | TEMPERATURE: 98 F | DIASTOLIC BLOOD PRESSURE: 83 MMHG

## 2023-12-27 DIAGNOSIS — S83.242D TEAR OF MEDIAL MENISCUS OF LEFT KNEE, CURRENT, UNSPECIFIED TEAR TYPE, SUBSEQUENT ENCOUNTER: Primary | ICD-10-CM

## 2023-12-27 DIAGNOSIS — Z82.49 FAMILY HISTORY OF EARLY CAD: ICD-10-CM

## 2023-12-27 PROCEDURE — 29881 ARTHRS KNE SRG MNISECTMY M/L: CPT | Mod: LT,,, | Performed by: ORTHOPAEDIC SURGERY

## 2023-12-27 PROCEDURE — 27337 PR EXCISON TUMOR SOFT TISSUE THIGH/KNEE SUBQ 3+CM: ICD-10-PCS | Mod: 51,LT,, | Performed by: ORTHOPAEDIC SURGERY

## 2023-12-27 PROCEDURE — 71000015 HC POSTOP RECOV 1ST HR: Performed by: ORTHOPAEDIC SURGERY

## 2023-12-27 PROCEDURE — 25000003 PHARM REV CODE 250: Performed by: NURSE ANESTHETIST, CERTIFIED REGISTERED

## 2023-12-27 PROCEDURE — 27340 PR REMOVAL PREPATELLA BURSA: ICD-10-PCS | Mod: 59,LT,, | Performed by: ORTHOPAEDIC SURGERY

## 2023-12-27 PROCEDURE — 71000033 HC RECOVERY, INTIAL HOUR: Performed by: ORTHOPAEDIC SURGERY

## 2023-12-27 PROCEDURE — D9220A PRA ANESTHESIA: Mod: CRNA,,, | Performed by: NURSE ANESTHETIST, CERTIFIED REGISTERED

## 2023-12-27 PROCEDURE — 25000003 PHARM REV CODE 250: Performed by: ANESTHESIOLOGY

## 2023-12-27 PROCEDURE — 63600175 PHARM REV CODE 636 W HCPCS: Performed by: NURSE ANESTHETIST, CERTIFIED REGISTERED

## 2023-12-27 PROCEDURE — 27340 REMOVAL OF KNEECAP BURSA: CPT | Mod: 59,LT,, | Performed by: ORTHOPAEDIC SURGERY

## 2023-12-27 PROCEDURE — 63600175 PHARM REV CODE 636 W HCPCS: Performed by: ANESTHESIOLOGY

## 2023-12-27 PROCEDURE — 27201423 OPTIME MED/SURG SUP & DEVICES STERILE SUPPLY: Performed by: ORTHOPAEDIC SURGERY

## 2023-12-27 PROCEDURE — 25000003 PHARM REV CODE 250: Performed by: ORTHOPAEDIC SURGERY

## 2023-12-27 PROCEDURE — D9220A PRA ANESTHESIA: Mod: ANES,,, | Performed by: ANESTHESIOLOGY

## 2023-12-27 PROCEDURE — 63600175 PHARM REV CODE 636 W HCPCS

## 2023-12-27 PROCEDURE — 71000039 HC RECOVERY, EACH ADD'L HOUR: Performed by: ORTHOPAEDIC SURGERY

## 2023-12-27 PROCEDURE — 37000009 HC ANESTHESIA EA ADD 15 MINS: Performed by: ORTHOPAEDIC SURGERY

## 2023-12-27 PROCEDURE — 27337 EXC THIGH/KNEE LES SC 3 CM/>: CPT | Mod: 51,LT,, | Performed by: ORTHOPAEDIC SURGERY

## 2023-12-27 PROCEDURE — 29881 PR KNEE SCOPE SINGLE MENISECECTOMY: ICD-10-PCS | Mod: LT,,, | Performed by: ORTHOPAEDIC SURGERY

## 2023-12-27 PROCEDURE — D9220A PRA ANESTHESIA: ICD-10-PCS | Mod: ANES,,, | Performed by: ANESTHESIOLOGY

## 2023-12-27 PROCEDURE — 36000711: Performed by: ORTHOPAEDIC SURGERY

## 2023-12-27 PROCEDURE — 63600175 PHARM REV CODE 636 W HCPCS: Performed by: ORTHOPAEDIC SURGERY

## 2023-12-27 PROCEDURE — 71000016 HC POSTOP RECOV ADDL HR: Performed by: ORTHOPAEDIC SURGERY

## 2023-12-27 PROCEDURE — 37000008 HC ANESTHESIA 1ST 15 MINUTES: Performed by: ORTHOPAEDIC SURGERY

## 2023-12-27 PROCEDURE — 36000710: Performed by: ORTHOPAEDIC SURGERY

## 2023-12-27 PROCEDURE — D9220A PRA ANESTHESIA: ICD-10-PCS | Mod: CRNA,,, | Performed by: NURSE ANESTHETIST, CERTIFIED REGISTERED

## 2023-12-27 RX ORDER — DEXAMETHASONE SODIUM PHOSPHATE 4 MG/ML
INJECTION, SOLUTION INTRA-ARTICULAR; INTRALESIONAL; INTRAMUSCULAR; INTRAVENOUS; SOFT TISSUE
Status: DISCONTINUED | OUTPATIENT
Start: 2023-12-27 | End: 2023-12-27

## 2023-12-27 RX ORDER — ONDANSETRON 2 MG/ML
INJECTION INTRAMUSCULAR; INTRAVENOUS
Status: DISCONTINUED | OUTPATIENT
Start: 2023-12-27 | End: 2023-12-27

## 2023-12-27 RX ORDER — OXYCODONE HYDROCHLORIDE 5 MG/1
5 TABLET ORAL
Status: DISCONTINUED | OUTPATIENT
Start: 2023-12-27 | End: 2023-12-27 | Stop reason: HOSPADM

## 2023-12-27 RX ORDER — MIDAZOLAM HYDROCHLORIDE 1 MG/ML
INJECTION INTRAMUSCULAR; INTRAVENOUS
Status: DISCONTINUED | OUTPATIENT
Start: 2023-12-27 | End: 2023-12-27

## 2023-12-27 RX ORDER — BUPIVACAINE HYDROCHLORIDE AND EPINEPHRINE 5; 5 MG/ML; UG/ML
INJECTION, SOLUTION EPIDURAL; INTRACAUDAL; PERINEURAL
Status: DISCONTINUED | OUTPATIENT
Start: 2023-12-27 | End: 2023-12-27 | Stop reason: HOSPADM

## 2023-12-27 RX ORDER — SCOLOPAMINE TRANSDERMAL SYSTEM 1 MG/1
1 PATCH, EXTENDED RELEASE TRANSDERMAL
Status: DISCONTINUED | OUTPATIENT
Start: 2023-12-27 | End: 2023-12-27 | Stop reason: HOSPADM

## 2023-12-27 RX ORDER — MUPIROCIN 20 MG/G
OINTMENT TOPICAL 2 TIMES DAILY
Status: CANCELLED | OUTPATIENT
Start: 2023-12-27 | End: 2024-01-01

## 2023-12-27 RX ORDER — HYDRALAZINE HYDROCHLORIDE 20 MG/ML
INJECTION INTRAMUSCULAR; INTRAVENOUS
Status: COMPLETED
Start: 2023-12-27 | End: 2023-12-27

## 2023-12-27 RX ORDER — HYDROCODONE BITARTRATE AND ACETAMINOPHEN 5; 325 MG/1; MG/1
1 TABLET ORAL EVERY 4 HOURS PRN
Status: CANCELLED | OUTPATIENT
Start: 2023-12-27

## 2023-12-27 RX ORDER — LIDOCAINE HYDROCHLORIDE 20 MG/ML
INJECTION INTRAVENOUS
Status: DISCONTINUED | OUTPATIENT
Start: 2023-12-27 | End: 2023-12-27

## 2023-12-27 RX ORDER — CEFAZOLIN SODIUM 2 G/50ML
2 SOLUTION INTRAVENOUS
Status: COMPLETED | OUTPATIENT
Start: 2023-12-27 | End: 2023-12-27

## 2023-12-27 RX ORDER — DIPHENHYDRAMINE HYDROCHLORIDE 50 MG/ML
12.5 INJECTION INTRAMUSCULAR; INTRAVENOUS
Status: DISCONTINUED | OUTPATIENT
Start: 2023-12-27 | End: 2023-12-27 | Stop reason: HOSPADM

## 2023-12-27 RX ORDER — FENTANYL CITRATE 50 UG/ML
INJECTION, SOLUTION INTRAMUSCULAR; INTRAVENOUS
Status: DISCONTINUED | OUTPATIENT
Start: 2023-12-27 | End: 2023-12-27

## 2023-12-27 RX ORDER — ONDANSETRON 2 MG/ML
4 INJECTION INTRAMUSCULAR; INTRAVENOUS DAILY PRN
Status: DISCONTINUED | OUTPATIENT
Start: 2023-12-27 | End: 2023-12-27 | Stop reason: HOSPADM

## 2023-12-27 RX ORDER — LABETALOL HYDROCHLORIDE 5 MG/ML
INJECTION, SOLUTION INTRAVENOUS
Status: DISCONTINUED | OUTPATIENT
Start: 2023-12-27 | End: 2023-12-27

## 2023-12-27 RX ORDER — PROPOFOL 10 MG/ML
INJECTION, EMULSION INTRAVENOUS
Status: DISCONTINUED | OUTPATIENT
Start: 2023-12-27 | End: 2023-12-27

## 2023-12-27 RX ORDER — HYDRALAZINE HYDROCHLORIDE 20 MG/ML
5 INJECTION INTRAMUSCULAR; INTRAVENOUS ONCE
Status: COMPLETED | OUTPATIENT
Start: 2023-12-27 | End: 2023-12-27

## 2023-12-27 RX ORDER — KETAMINE HYDROCHLORIDE 10 MG/ML
INJECTION, SOLUTION INTRAMUSCULAR; INTRAVENOUS
Status: DISCONTINUED | OUTPATIENT
Start: 2023-12-27 | End: 2023-12-27

## 2023-12-27 RX ORDER — HYDROMORPHONE HYDROCHLORIDE 1 MG/ML
0.2 INJECTION, SOLUTION INTRAMUSCULAR; INTRAVENOUS; SUBCUTANEOUS EVERY 5 MIN PRN
Status: DISCONTINUED | OUTPATIENT
Start: 2023-12-27 | End: 2023-12-27 | Stop reason: HOSPADM

## 2023-12-27 RX ORDER — FAMOTIDINE 10 MG/ML
INJECTION INTRAVENOUS
Status: DISCONTINUED | OUTPATIENT
Start: 2023-12-27 | End: 2023-12-27

## 2023-12-27 RX ADMIN — ONDANSETRON 4 MG: 2 INJECTION INTRAMUSCULAR; INTRAVENOUS at 10:12

## 2023-12-27 RX ADMIN — FENTANYL CITRATE 50 MCG: 50 INJECTION, SOLUTION INTRAMUSCULAR; INTRAVENOUS at 10:12

## 2023-12-27 RX ADMIN — KETAMINE HYDROCHLORIDE 50 MG: 10 INJECTION, SOLUTION INTRAMUSCULAR; INTRAVENOUS at 10:12

## 2023-12-27 RX ADMIN — SODIUM CHLORIDE, SODIUM LACTATE, POTASSIUM CHLORIDE, AND CALCIUM CHLORIDE: .6; .31; .03; .02 INJECTION, SOLUTION INTRAVENOUS at 09:12

## 2023-12-27 RX ADMIN — MIDAZOLAM HYDROCHLORIDE 2 MG: 1 INJECTION, SOLUTION INTRAMUSCULAR; INTRAVENOUS at 09:12

## 2023-12-27 RX ADMIN — HYDROMORPHONE HYDROCHLORIDE 0.2 MG: 1 INJECTION, SOLUTION INTRAMUSCULAR; INTRAVENOUS; SUBCUTANEOUS at 11:12

## 2023-12-27 RX ADMIN — HYDROMORPHONE HYDROCHLORIDE 0.2 MG: 1 INJECTION, SOLUTION INTRAMUSCULAR; INTRAVENOUS; SUBCUTANEOUS at 12:12

## 2023-12-27 RX ADMIN — HYDRALAZINE HYDROCHLORIDE 5 MG: 20 INJECTION INTRAMUSCULAR; INTRAVENOUS at 11:12

## 2023-12-27 RX ADMIN — FAMOTIDINE 20 MG: 10 INJECTION, SOLUTION INTRAVENOUS at 10:12

## 2023-12-27 RX ADMIN — LABETALOL HYDROCHLORIDE 5 MG: 5 INJECTION, SOLUTION INTRAVENOUS at 10:12

## 2023-12-27 RX ADMIN — PROPOFOL 250 MG: 10 INJECTION, EMULSION INTRAVENOUS at 10:12

## 2023-12-27 RX ADMIN — LIDOCAINE HYDROCHLORIDE 50 MG: 20 INJECTION, SOLUTION INTRAVENOUS at 10:12

## 2023-12-27 RX ADMIN — CEFAZOLIN SODIUM 2 G: 2 SOLUTION INTRAVENOUS at 10:12

## 2023-12-27 RX ADMIN — OXYCODONE HYDROCHLORIDE 5 MG: 5 TABLET ORAL at 11:12

## 2023-12-27 RX ADMIN — DEXAMETHASONE SODIUM PHOSPHATE 8 MG: 4 INJECTION, SOLUTION INTRAMUSCULAR; INTRAVENOUS at 10:12

## 2023-12-27 NOTE — TRANSFER OF CARE
"Anesthesia Transfer of Care Note    Patient: Cornelius Couch    Procedure(s) Performed: Procedure(s) (LRB):  ARTHROSCOPY, KNEE, WITH PARTIAL MENISCECTOMY, LEFT (Left)  EXCISION, CYST, KNEE, OPEN (Left)    Patient location: PACU    Anesthesia Type: general    Transport from OR: Transported from OR on room air with adequate spontaneous ventilation    Post pain: adequate analgesia    Post assessment: no apparent anesthetic complications    Post vital signs: stable    Level of consciousness: awake and alert    Nausea/Vomiting: no nausea/vomiting    Complications: none    Transfer of care protocol was followed      Last vitals: Visit Vitals  BP (!) 145/94 (BP Location: Left arm, Patient Position: Lying)   Pulse 74   Temp 36.7 °C (98 °F) (Oral)   Resp 17   Ht 5' 9" (1.753 m)   Wt 74.8 kg (165 lb)   SpO2 99%   BMI 24.37 kg/m²     "

## 2023-12-27 NOTE — ANESTHESIA POSTPROCEDURE EVALUATION
Anesthesia Post Evaluation    Patient: Cornelius Couch    Procedure(s) Performed: Procedure(s) (LRB):  ARTHROSCOPY, KNEE, WITH PARTIAL MENISCECTOMY (Left)  EXCISION, CYST, KNEE, OPEN (Left)    Final Anesthesia Type: general      Patient location during evaluation: PACU  Patient participation: Yes- Able to Participate  Level of consciousness: awake and alert  Post-procedure vital signs: reviewed and stable  Pain management: adequate  Airway patency: patent    PONV status at discharge: No PONV  Anesthetic complications: no      Cardiovascular status: blood pressure returned to baseline and stable  Respiratory status: unassisted and room air  Hydration status: euvolemic  Follow-up not needed.              Vitals Value Taken Time   /92 12/27/23 1205   Temp 36.2 °C (97.2 °F) 12/27/23 1115   Pulse 70 12/27/23 1209   Resp 18 12/27/23 1209   SpO2 100 % 12/27/23 1209   Vitals shown include unvalidated device data.      No case tracking events are documented in the log.      Pain/Donnie Score: Pain Rating Prior to Med Admin: 6 (12/27/2023 12:05 PM)  Donnie Score: 10 (12/27/2023 12:05 PM)

## 2023-12-27 NOTE — ANESTHESIA PREPROCEDURE EVALUATION
12/27/2023  Cornelius Couch is a 58 y.o., male.    Patient Active Problem List   Diagnosis    Benign prostatic hyperplasia with urinary frequency    Anemia    Vitamin D deficiency    BMI 20.0-20.9, adult    Right lateral epicondylitis    Screen for colon cancer    History of colon polyps    GERD (gastroesophageal reflux disease)       Past Surgical History:   Procedure Laterality Date    bone fusion Left 1977    ankle    COLONOSCOPY N/A 11/12/2018    Procedure: COLONOSCOPY;  Surgeon: Car Coats MD;  Location: [facility] ENDO;  Service: Endoscopy;  Laterality: N/A;    COLONOSCOPY N/A 7/12/2021    Procedure: COLONOSCOPY;  Surgeon: Car Coats MD;  Location: [facility] ENDO;  Service: Endoscopy;  Laterality: N/A;    COLONOSCOPY N/A 7/25/2022    Procedure: COLONOSCOPY;  Surgeon: Car Coats MD;  Location: [facility] ENDO;  Service: Endoscopy;  Laterality: N/A;    ESOPHAGOGASTRODUODENOSCOPY N/A 9/16/2021    Procedure: EGD (ESOPHAGOGASTRODUODENOSCOPY);  Surgeon: Car Coats MD;  Location: [facility];  Service: Endoscopy;  Laterality: N/A;    FRACTURE SURGERY      finger left pinky,     TONSILLECTOMY          Tobacco Use:  The patient  reports that he quit smoking about 14 years ago. His smoking use included cigarettes. He started smoking about 44 years ago. He has a 45.0 pack-year smoking history. He has been exposed to tobacco smoke. He has never used smokeless tobacco.     Results for orders placed or performed during the hospital encounter of 12/12/23   EKG 12-lead    Collection Time: 12/12/23  3:37 PM    Narrative    Test Reason : S83.242D,    Vent. Rate : 082 BPM     Atrial Rate : 082 BPM     P-R Int : 156 ms          QRS Dur : 092 ms      QT Int : 378 ms       P-R-T Axes : 053 055 058 degrees     QTc Int : 441 ms    Normal sinus rhythm  Normal ECG  When compared with ECG of 18-APR-2017 13:12,  No  significant change was found  Confirmed by Aura MIRANDA, Cipriano GONZALEZ ([id number]) on 12/12/2023 8:37:33 PM    Referred By:             Confirmed By:Cipriano Chavarria MD             Lab Results   Component Value Date    WBC 5.06 12/26/2023    HGB 13.8 (L) 12/26/2023    HCT 42.0 12/26/2023    MCV 94 12/26/2023     12/26/2023     BMP  Lab Results   Component Value Date     12/26/2023    K 4.0 12/26/2023     12/26/2023    CO2 29 12/26/2023    BUN 10 12/26/2023    CREATININE 0.9 12/26/2023    CALCIUM 9.2 12/26/2023    ANIONGAP 5 (L) 12/26/2023    GLU 90 12/26/2023    GLU 84 11/30/2023    GLU 99 06/08/2023       No results found for this or any previous visit.            Pre-op Assessment    I have reviewed the Patient Summary Reports.     I have reviewed the Nursing Notes. I have reviewed the NPO Status.   I have reviewed the Medications.     Review of Systems  Anesthesia Hx:  No problems with previous Anesthesia             Denies Family Hx of Anesthesia complications.   Personal Hx of Anesthesia complications, Post-Operative Nausea/Vomiting                    Social:  Non-Smoker       Hematology/Oncology:  Hematology Normal                  Hematology Comments: History of blood transfusion.                    EENT/Dental:  EENT/Dental Normal           Cardiovascular:                hyperlipidemia                             Pulmonary:  Pulmonary Normal                       Renal/:    BPH              Hepatic/GI:     GERD   Colon polyps          Musculoskeletal:  Musculoskeletal Normal                Neurological:  Neurology Normal                                      Endocrine:  Endocrine Normal            Dermatological:  S/p shingles infection   Psych:  Psychiatric Normal                    Physical Exam  General: Well nourished    Airway:  Mallampati: II   Mouth Opening: Normal  TM Distance: Normal  Tongue: Normal  Neck ROM: Normal ROM    Chest/Lungs:  Clear to auscultation, Normal Respiratory  Rate    Heart:  Rate: Normal  Rhythm: Regular Rhythm        Anesthesia Plan  Type of Anesthesia, risks & benefits discussed:    Anesthesia Type: Gen ETT  Intra-op Monitoring Plan: Standard ASA Monitors  Post Op Pain Control Plan: IV/PO Opioids PRN and multimodal analgesia  Induction:  IV  Airway Plan: Video  Informed Consent: Informed consent signed with the Patient and all parties understand the risks and agree with anesthesia plan.  All questions answered.   ASA Score: 2  Anesthesia Plan Notes: LMA okay  Reduce narcotics (h/o PONV)  Multimodal analgesia with ofirmev 1000mg, ketamine 25 mg and decadron 8 mg.  PONV prophylaxis with Pepcid 20 mg IV, and Zofran 4 mg IV and scopolamine patch.        Ready For Surgery From Anesthesia Perspective.     .

## 2023-12-27 NOTE — PLAN OF CARE
Pt AAOx3, Vital signs stable, pt tolerating oral liquids and voiding without difficulty. Left knee in ACE wrap clean, dry and intact. Pt ready for discharge

## 2023-12-27 NOTE — OP NOTE
[facility]  Surgery Department  Operative Note    SUMMARY     Date of Procedure: 12/27/2023     Procedure:  Open excision of prepatellar cyst 5 cm x 5 cm                       Open excision of iliotibial band cyst 5 cm x 5 cm                        Arthroscopic partial medial meniscectomy left knee    Surgeon(s) and Role:     * Orlin Ochoa MD - Primary    Assisting Surgeon:  Teo    Pre-Operative Diagnosis: Tear of medial meniscus of left knee, current, unspecified tear type, subsequent encounter [S83.242D]    Post-Operative Diagnosis: Post-Op Diagnosis Codes:     * Tear of medial meniscus of left knee, current, unspecified tear type, subsequent encounter [S83.242D]  Prepatellar cyst 5 cm x 5 cm extra-articular  Iliotibial band cyst 5 cm x 5 cm extra-articular    Anesthesia: General    Intraoperative Findings:  The patellofemoral joint was noted to track centrally.  The cartilage was intact.  The ACL and PCL were intact.  The lateral compartment was pristine.  The medial compartment had intact cartilaginous surfaces.  There was a small undersurface medial meniscal tear.    There was a large thick-walled cyst over the anterior aspect of the patellar tendon extending down to the tibial tubercle measuring 5 cm x 5 cm.  It did not appear to communicate with the joint.      There was a large cyst over the terminal aspects of the IT band extending all the way to the lateral femoral condyle.  It went down to the cyst but did not appear to communicate with the joint.    Description of the Findings of the Procedure:  Patient was placed on the operative table in supine position.  The knee was prepped and draped in the usual sterile manner for surgery.  I made a 5 cm incision directly over the anterior aspect of the knee directly over the anterior cyst.  I meticulously sharply excise the cyst in its entirety down to the patellar tendon.  It did not appear to breach the joint.  With rigorous motion of the  joint no fluid could be seen protruding through any area of the capsule.  I copiously irrigated.  I turned my attention to the lateral cyst over the IT band.  A 5 cm incision was made directly over that cyst using a 10 blade.  I meticulously dissected out the cyst being sure not to get anyone year the peroneal nerve.  The cyst went all the way down to the IT band.  It was well contained and I excised in its entirety.  Both cyst were sent to pathology.  I copiously irrigated.  Again no fluid could be seen protruding through the capsule.  At this point I turned my attention to the knee scope.  Inferomedial inferolateral portals were established and diagnostic arthroscopy was undertaken.  The findings of those stated above.  At this point I used a shaver excised the small posterior horn meniscal flap.  Approximately 30% of the posterior horn was excised.  I now study the knee carefully the not appear to be any communication with the capsule noted from the inside of the knee.  No fluid from the arthroscopy could be seen extruding through any of the cyst beds that had been created by excision of the cyst.  I now removed the arthroscopic equipment.  The portals were closed with nylon stitches.  The 2 cyst incisions were closed similarly.  We used 2-0 Vicryl to close the subcu and 4-0 Monocryl to close the skin.  Sterile dressings were applied and the patient was noted to be in stable condition    Complications: No    Estimated Blood Loss (EBL): * No values recorded between 12/27/2023 10:32 AM and 12/27/2023 10:53 AM *           Implants: * No implants in log *    Specimens:   Specimen (24h ago, onward)       Start     Ordered    12/27/23 1050  Specimen to Pathology - Surgery  Once        Comments: Pre-op Diagnosis: Tear of medial meniscus of left knee, current, unspecified tear type, subsequent encounter [S83.577Y]Procedure(s):ARTHROSCOPY, KNEE, WITH MENISCECTOMY, LEFT Number of specimens: 2Name of specimens: 1.  Prepatellar cyst.2. Lateral knee cyst.     Question:  Release to patient  Answer:  Immediate    12/27/23 1050                            Condition: Good    Disposition: PACU - hemodynamically stable.              Discharge Note    SUMMARY     Admit Date: 12/27/2023    Discharge Date and Time:  12/27/2023 10:53 AM    Hospital Course (synopsis of major diagnoses, care, treatment, and services provided during the course of the hospital stay): Uneventful      Final Diagnosis: Post-Op Diagnosis Codes:     * Tear of medial meniscus of left knee, current, unspecified tear type, subsequent encounter [S83.242D]    Disposition: Home or Self Care    Follow Up/Patient Instructions:     Medications:  Reconciled Home Medications:   Current Discharge Medication List        CONTINUE these medications which have NOT CHANGED    Details   ascorbic acid, vitamin C, (VITAMIN C) 100 MG tablet Take 100 mg by mouth once daily.      dutasteride (AVODART) 0.5 mg capsule Take 1 capsule (0.5 mg total) by mouth once daily.  Qty: 30 capsule, Refills: 11    Associated Diagnoses: Benign prostatic hyperplasia with urinary frequency      rosuvastatin (CRESTOR) 20 MG tablet Take 1 tablet (20 mg total) by mouth once daily.  Qty: 90 tablet, Refills: 3    Associated Diagnoses: Mixed hyperlipidemia      vitamin D (VITAMIN D3) 1000 units Tab Take 1,000 Units by mouth once daily.      oxyCODONE-acetaminophen (PERCOCET) 7.5-325 mg per tablet Take 1 tablet by mouth every 6 (six) hours as needed for Pain.  Qty: 28 tablet, Refills: 0    Associated Diagnoses: Tear of medial meniscus of left knee, current, unspecified tear type, subsequent encounter           Discharge Procedure Orders   Diet general     Activity as tolerated     Sponge bath only until clinic visit     Ice to affected area     Weight bearing restrictions (specify)     Remove dressing in 24 hours     Call MD for:  temperature >100.4     Call MD for:  persistent nausea and vomiting     Call MD  for:  severe uncontrolled pain     Call MD for:  difficulty breathing, headache or visual disturbances     Call MD for:  redness, tenderness, or signs of infection (pain, swelling, redness, odor or green/yellow discharge around incision site)     Call MD for:  hives     Call MD for:  persistent dizziness or light-headedness     Call MD for:  extreme fatigue     Shower on day dressing removed (No bath)

## 2023-12-28 ENCOUNTER — OFFICE VISIT (OUTPATIENT)
Dept: ORTHOPEDICS | Facility: CLINIC | Age: 58
End: 2023-12-28
Payer: COMMERCIAL

## 2023-12-28 VITALS — WEIGHT: 165 LBS | HEIGHT: 69 IN | BODY MASS INDEX: 24.44 KG/M2

## 2023-12-28 DIAGNOSIS — Z98.890 S/P ARTHROSCOPY OF LEFT KNEE: Primary | ICD-10-CM

## 2023-12-28 PROCEDURE — 1160F PR REVIEW ALL MEDS BY PRESCRIBER/CLIN PHARMACIST DOCUMENTED: ICD-10-PCS | Mod: CPTII,S$GLB,, | Performed by: ORTHOPAEDIC SURGERY

## 2023-12-28 PROCEDURE — 99024 PR POST-OP FOLLOW-UP VISIT: ICD-10-PCS | Mod: S$GLB,,, | Performed by: ORTHOPAEDIC SURGERY

## 2023-12-28 PROCEDURE — 1159F PR MEDICATION LIST DOCUMENTED IN MEDICAL RECORD: ICD-10-PCS | Mod: CPTII,S$GLB,, | Performed by: ORTHOPAEDIC SURGERY

## 2023-12-28 PROCEDURE — 99024 POSTOP FOLLOW-UP VISIT: CPT | Mod: S$GLB,,, | Performed by: ORTHOPAEDIC SURGERY

## 2023-12-28 PROCEDURE — 1160F RVW MEDS BY RX/DR IN RCRD: CPT | Mod: CPTII,S$GLB,, | Performed by: ORTHOPAEDIC SURGERY

## 2023-12-28 PROCEDURE — 1159F MED LIST DOCD IN RCRD: CPT | Mod: CPTII,S$GLB,, | Performed by: ORTHOPAEDIC SURGERY

## 2023-12-28 NOTE — PROGRESS NOTES
[facility] ORTHOPEDICS POST-OP NOTE    Subjective:           Chief Complaint:   Chief Complaint   Patient presents with    Left Knee - Post-op Evaluation     POD 1 L. Knee scope slight increased in pain when bending       Past Medical History:   Diagnosis Date    Arthritis     Colon polyps     Encounter for blood transfusion 1976    GERD (gastroesophageal reflux disease)     Mixed hyperlipidemia 2022    PONV (postoperative nausea and vomiting) 1997       Past Surgical History:   Procedure Laterality Date    bone fusion Left 1977    ankle    COLONOSCOPY N/A 11/12/2018    Procedure: COLONOSCOPY;  Surgeon: Car Coats MD;  Location: [facility] ENDO;  Service: Endoscopy;  Laterality: N/A;    COLONOSCOPY N/A 7/12/2021    Procedure: COLONOSCOPY;  Surgeon: Car Coats MD;  Location: [facility] ENDO;  Service: Endoscopy;  Laterality: N/A;    COLONOSCOPY N/A 7/25/2022    Procedure: COLONOSCOPY;  Surgeon: Car Coats MD;  Location: [facility] ENDO;  Service: Endoscopy;  Laterality: N/A;    ESOPHAGOGASTRODUODENOSCOPY N/A 9/16/2021    Procedure: EGD (ESOPHAGOGASTRODUODENOSCOPY);  Surgeon: Car Coats MD;  Location: [facility] ENDO;  Service: Endoscopy;  Laterality: N/A;    FRACTURE SURGERY      finger left pinky,     TONSILLECTOMY         Current Outpatient Medications   Medication Sig    ascorbic acid, vitamin C, (VITAMIN C) 100 MG tablet Take 100 mg by mouth once daily.    dutasteride (AVODART) 0.5 mg capsule Take 1 capsule (0.5 mg total) by mouth once daily.    oxyCODONE-acetaminophen (PERCOCET) 7.5-325 mg per tablet Take 1 tablet by mouth every 6 (six) hours as needed for Pain.    rosuvastatin (CRESTOR) 20 MG tablet Take 1 tablet (20 mg total) by mouth once daily.    vitamin D (VITAMIN D3) 1000 units Tab Take 1,000 Units by mouth once daily.     No current facility-administered medications for this visit.       Review of patient's allergies indicates:   Allergen Reactions    Codeine Itching     Anything with codeine     Adhesive Rash       Family History   Problem Relation Age of Onset    Alzheimer's disease Mother     Heart disease Father        Social History     Socioeconomic History    Marital status:    Tobacco Use    Smoking status: Former     Current packs/day: 0.00     Average packs/day: 1.5 packs/day for 30.0 years (45.0 ttl pk-yrs)     Types: Cigarettes     Start date: 1979     Quit date: 2009     Years since quittin.7     Passive exposure: Past    Smokeless tobacco: Never   Substance and Sexual Activity    Alcohol use: Yes     Alcohol/week: 1.0 standard drink of alcohol     Types: 1 Glasses of wine per week     Comment: occ    Drug use: Yes     Types: Marijuana       History of present illness:  Patient comes in today status post open excision of 2 large cyst from the knee area.  He is generally doing very well his pain is well controlled      Review of Systems:    Musculoskeletal:  See HPI      Objective:        Physical Examination:    Vital Signs:  There were no vitals filed for this visit.    Body mass index is 24.37 kg/m².    This a well-developed, well nourished patient in no acute distress.  They are alert and oriented and cooperative to examination.        Wounds are clean dry and intact is neurovascularly intact to the foot.  Pertinent New Results:    XRAY Report / Interpretation:       Assessment/Plan:      Stable following open excision cyst.  He will follow-up in 1 week for suture removal.  Check pathology at that time.    This note was created using Dragon voice recognition software that occasionally misinterpreted phrases or words.

## 2023-12-31 NOTE — PROGRESS NOTES
Patient scheduled to have upcoming elective surgery but has broken out in a rash that he thinks is shingles. Viewed rash and from symptoms agree with patient. Patient will contact his surgeon to reschedule surgery

## 2024-01-04 ENCOUNTER — OFFICE VISIT (OUTPATIENT)
Dept: ORTHOPEDICS | Facility: CLINIC | Age: 59
End: 2024-01-04
Payer: COMMERCIAL

## 2024-01-04 VITALS — BODY MASS INDEX: 24.44 KG/M2 | WEIGHT: 165 LBS | HEIGHT: 69 IN

## 2024-01-04 DIAGNOSIS — Z98.890 S/P ARTHROSCOPY OF LEFT KNEE: Primary | ICD-10-CM

## 2024-01-04 PROCEDURE — 1160F RVW MEDS BY RX/DR IN RCRD: CPT | Mod: CPTII,S$GLB,, | Performed by: ORTHOPAEDIC SURGERY

## 2024-01-04 PROCEDURE — 99024 POSTOP FOLLOW-UP VISIT: CPT | Mod: S$GLB,,, | Performed by: ORTHOPAEDIC SURGERY

## 2024-01-04 PROCEDURE — 1159F MED LIST DOCD IN RCRD: CPT | Mod: CPTII,S$GLB,, | Performed by: ORTHOPAEDIC SURGERY

## 2024-01-04 NOTE — PROGRESS NOTES
Formerly [facility] ORTHOPEDICS POST-OP NOTE    Subjective:           Chief Complaint:   Chief Complaint   Patient presents with    Left Knee - Post-op Evaluation     Left knee arthroscopy 12/27/23, patient states knee is doing pretty good       Past Medical History:   Diagnosis Date    Arthritis     Colon polyps     Encounter for blood transfusion 1976    GERD (gastroesophageal reflux disease)     Mixed hyperlipidemia 2022    PONV (postoperative nausea and vomiting) 1997       Past Surgical History:   Procedure Laterality Date    bone fusion Left 1977    ankle    COLONOSCOPY N/A 11/12/2018    Procedure: COLONOSCOPY;  Surgeon: Car Coats MD;  Location: [facility] ENDO;  Service: Endoscopy;  Laterality: N/A;    COLONOSCOPY N/A 7/12/2021    Procedure: COLONOSCOPY;  Surgeon: Car Coats MD;  Location: [facility] ENDO;  Service: Endoscopy;  Laterality: N/A;    COLONOSCOPY N/A 7/25/2022    Procedure: COLONOSCOPY;  Surgeon: Car Coats MD;  Location: [facility] ENDO;  Service: Endoscopy;  Laterality: N/A;    ESOPHAGOGASTRODUODENOSCOPY N/A 9/16/2021    Procedure: EGD (ESOPHAGOGASTRODUODENOSCOPY);  Surgeon: Car Coats MD;  Location: [facility] ENDO;  Service: Endoscopy;  Laterality: N/A;    FRACTURE SURGERY      finger left pinky,     KNEE ARTHROSCOPY W/ MENISCECTOMY Left 12/27/2023    Procedure: ARTHROSCOPY, KNEE, WITH PARTIAL MENISCECTOMY;  Surgeon: Orlin Ochoa MD;  Location: [facility];  Service: Orthopedics;  Laterality: Left;    OPEN SURGICAL REMOVAL OF CYST OF KNEE Left 12/27/2023    Procedure: EXCISION, CYST, KNEE, OPEN;  Surgeon: Orlin Ochoa MD;  Location: [facility];  Service: Orthopedics;  Laterality: Left;  1. prepatellar cyst, 2. lateral knee cysts    TONSILLECTOMY         Current Outpatient Medications   Medication Sig    ascorbic acid, vitamin C, (VITAMIN C) 100 MG tablet Take 100 mg by mouth once daily.    dutasteride (AVODART) 0.5 mg capsule Take 1 capsule (0.5 mg total) by mouth once daily.     oxyCODONE-acetaminophen (PERCOCET) 7.5-325 mg per tablet Take 1 tablet by mouth every 6 (six) hours as needed for Pain.    rosuvastatin (CRESTOR) 20 MG tablet Take 1 tablet (20 mg total) by mouth once daily.    vitamin D (VITAMIN D3) 1000 units Tab Take 1,000 Units by mouth once daily.     No current facility-administered medications for this visit.       Review of patient's allergies indicates:   Allergen Reactions    Codeine Itching     Anything with codeine    Adhesive Rash       Family History   Problem Relation Age of Onset    Alzheimer's disease Mother     Heart disease Father        Social History     Socioeconomic History    Marital status:    Tobacco Use    Smoking status: Former     Current packs/day: 0.00     Average packs/day: 1.5 packs/day for 30.0 years (45.0 ttl pk-yrs)     Types: Cigarettes     Start date: 1979     Quit date: 2009     Years since quittin.7     Passive exposure: Past    Smokeless tobacco: Never   Substance and Sexual Activity    Alcohol use: Yes     Alcohol/week: 1.0 standard drink of alcohol     Types: 1 Glasses of wine per week     Comment: occ    Drug use: Yes     Types: Marijuana       History of present illness:  Patient returns status post arthroscopy of the knee.  He is doing very well.  At the time of arthroscopy to enormous cysts were removed.  He is here for follow-up and also for pathology      Review of Systems:    Musculoskeletal:  See HPI      Objective:        Physical Examination:    Vital Signs:  There were no vitals filed for this visit.    Body mass index is 24.37 kg/m².    This a well-developed, well nourished patient in no acute distress.  They are alert and oriented and cooperative to examination.        Wounds are clean dry and intact.  No recurrence of fluid.  No drainage.  Range of motion of the knee is full  Pertinent New Results:  Pathology is reviewed.  No evidence of malignancy  XRAY Report / Interpretation:       Assessment/Plan:       Stable following removal of large cysts from the knee region.  He may be weight-bearing as tolerated.  He may shower.  He will follow-up in 1 month if he is symptomatic    This note was created using Dragon voice recognition software that occasionally misinterpreted phrases or words.

## 2024-02-01 ENCOUNTER — OFFICE VISIT (OUTPATIENT)
Dept: ORTHOPEDICS | Facility: CLINIC | Age: 59
End: 2024-02-01
Payer: COMMERCIAL

## 2024-02-01 VITALS — BODY MASS INDEX: 24.44 KG/M2 | WEIGHT: 165 LBS | HEIGHT: 69 IN

## 2024-02-01 DIAGNOSIS — Z98.890 S/P ARTHROSCOPY OF LEFT KNEE: Primary | ICD-10-CM

## 2024-02-01 PROCEDURE — 1159F MED LIST DOCD IN RCRD: CPT | Mod: CPTII,S$GLB,, | Performed by: ORTHOPAEDIC SURGERY

## 2024-02-01 PROCEDURE — 99024 POSTOP FOLLOW-UP VISIT: CPT | Mod: S$GLB,,, | Performed by: ORTHOPAEDIC SURGERY

## 2024-02-01 PROCEDURE — 1160F RVW MEDS BY RX/DR IN RCRD: CPT | Mod: CPTII,S$GLB,, | Performed by: ORTHOPAEDIC SURGERY

## 2024-02-01 NOTE — PROGRESS NOTES
[facility]THOPED[facility] POST-OP NOTE    Subjective:           Chief Complaint:   Chief Complaint   Patient presents with    Left Knee - Pain, Post-op Evaluation     Patient is here for a PO f/up Left knee Scope 12.27.23, states his pain is off/on, gets a deep joint pain. Has knot on the lateal side of his knee, thinks its from an cyst.        Past Medical History:   Diagnosis Date    Arthritis     Colon polyps     Encounter for blood transfusion 1976    GERD (gastroesophageal reflux disease)     Mixed hyperlipidemia 2022    PONV (postoperative nausea and vomiting) 1997       Past Surgical History:   Procedure Laterality Date    bone fusion Left 1977    ankle    COLONOSCOPY N/A 11/12/2018    Procedure: COLONOSCOPY;  Surgeon: Car Coats MD;  Location: [facility] ENDO;  Service: Endoscopy;  Laterality: N/A;    COLONOSCOPY N/A 7/12/2021    Procedure: COLONOSCOPY;  Surgeon: Car Coats MD;  Location: [facility] ENDO;  Service: Endoscopy;  Laterality: N/A;    COLONOSCOPY N/A 7/25/2022    Procedure: COLONOSCOPY;  Surgeon: Car Coats MD;  Location: [facility] ENDO;  Service: Endoscopy;  Laterality: N/A;    ESOPHAGOGASTRODUODENOSCOPY N/A 9/16/2021    Procedure: EGD (ESOPHAGOGASTRODUODENOSCOPY);  Surgeon: Car Coats MD;  Location: [facility] ENDO;  Service: Endoscopy;  Laterality: N/A;    FRACTURE SURGERY      finger left pinky,     KNEE ARTHROSCOPY W/ MENISCECTOMY Left 12/27/2023    Procedure: ARTHROSCOPY, KNEE, WITH PARTIAL MENISCECTOMY;  Surgeon: Orlin Ochoa MD;  Location: [facility] OR;  Service: Orthopedics;  Laterality: Left;    OPEN SURGICAL REMOVAL OF CYST OF KNEE Left 12/27/2023    Procedure: EXCISION, CYST, KNEE, OPEN;  Surgeon: Orlin Ochoa MD;  Location: [facility] OR;  Service: Orthopedics;  Laterality: Left;  1. prepatellar cyst, 2. lateral knee cysts    TONSILLECTOMY         Current Outpatient Medications   Medication Sig    ascorbic acid, vitamin C, (VITAMIN C) 100 MG tablet Take 100 mg by mouth once daily.     dutasteride (AVODART) 0.5 mg capsule Take 1 capsule (0.5 mg total) by mouth once daily.    rosuvastatin (CRESTOR) 20 MG tablet Take 1 tablet (20 mg total) by mouth once daily.    vitamin D (VITAMIN D3) 1000 units Tab Take 1,000 Units by mouth once daily.    oxyCODONE-acetaminophen (PERCOCET) 7.5-325 mg per tablet Take 1 tablet by mouth every 6 (six) hours as needed for Pain. (Patient not taking: Reported on 2024)     No current facility-administered medications for this visit.       Review of patient's allergies indicates:   Allergen Reactions    Codeine Itching     Anything with codeine    Adhesive Rash       Family History   Problem Relation Age of Onset    Alzheimer's disease Mother     Heart disease Father        Social History     Socioeconomic History    Marital status:    Tobacco Use    Smoking status: Former     Current packs/day: 0.00     Average packs/day: 1.5 packs/day for 30.0 years (45.0 ttl pk-yrs)     Types: Cigarettes     Start date: 1979     Quit date: 2009     Years since quittin.8     Passive exposure: Past    Smokeless tobacco: Never   Substance and Sexual Activity    Alcohol use: Yes     Alcohol/week: 1.0 standard drink of alcohol     Types: 1 Glasses of wine per week     Comment: occ    Drug use: Yes     Types: Marijuana       History of present illness:  58-year-old male, returns to clinic today status post arthroscopy of the left knee done .  Here today for routine follow-up.    Date of Procedure: 2023      Procedure:  Open excision of prepatellar cyst 5 cm x 5 cm                       Open excision of iliotibial band cyst 5 cm x 5 cm                        Arthroscopic partial medial meniscectomy left knee     Surgeon(s) and Role:     * Orlin Ochoa MD - Primary     Assisting Surgeon:  Teo     Pre-Operative Diagnosis: Tear of medial meniscus of left knee, current, unspecified tear type, subsequent encounter [Z06.057F]     Post-Operative  Diagnosis: Post-Op Diagnosis Codes:     * Tear of medial meniscus of left knee, current, unspecified tear type, subsequent encounter [J63.060X]  Prepatellar cyst 5 cm x 5 cm extra-articular  Iliotibial band cyst 5 cm x 5 cm extra-articular     Anesthesia: General     Intraoperative Findings:  The patellofemoral joint was noted to track centrally.  The cartilage was intact.  The ACL and PCL were intact.  The lateral compartment was pristine.  The medial compartment had intact cartilaginous surfaces.  There was a small undersurface medial meniscal tear.     There was a large thick-walled cyst over the anterior aspect of the patellar tendon extending down to the tibial tubercle measuring 5 cm x 5 cm.  It did not appear to communicate with the joint.       There was a large cyst over the terminal aspects of the IT band extending all the way to the lateral femoral condyle.  It went down to the cyst but did not appear to communicate with the joint.     Description of the Findings of the Procedure:  Patient was placed on the operative table in supine position.  The knee was prepped and draped in the usual sterile manner for surgery.  I made a 5 cm incision directly over the anterior aspect of the knee directly over the anterior cyst.  I meticulously sharply excise the cyst in its entirety down to the patellar tendon.  It did not appear to breach the joint.  With rigorous motion of the joint no fluid could be seen protruding through any area of the capsule.  I copiously irrigated.  I turned my attention to the lateral cyst over the IT band.  A 5 cm incision was made directly over that cyst using a 10 blade.  I meticulously dissected out the cyst being sure not to get anyone year the peroneal nerve.  The cyst went all the way down to the IT band.  It was well contained and I excised in its entirety.  Both cyst were sent to pathology.  I copiously irrigated.  Again no fluid could be seen protruding through the capsule.  At  this point I turned my attention to the knee scope.  Inferomedial inferolateral portals were established and diagnostic arthroscopy was undertaken.  The findings of those stated above.  At this point I used a shaver excised the small posterior horn meniscal flap.  Approximately 30% of the posterior horn was excised.  I now study the knee carefully the not appear to be any communication with the capsule noted from the inside of the knee.  No fluid from the arthroscopy could be seen extruding through any of the cyst beds that had been created by excision of the cyst.  I now removed the arthroscopic equipment.  The portals were closed with nylon stitches.  The 2 cyst incisions were closed similarly.  We used 2-0 Vicryl to close the subcu and 4-0 Monocryl to close the skin.  Sterile dressings were applied and the patient was noted to be in stable condition     Review of Systems:    Musculoskeletal:  See HPI      Objective:        Physical Examination:    Vital Signs:  There were no vitals filed for this visit.    Body mass index is 24.37 kg/m².    This a well-developed, well nourished patient in no acute distress.  They are alert and oriented and cooperative to examination.        Examination of the left knee, patient has well-healed surgical incisions over the anterior lateral and arthroscopic portals of the left knee.  Skin is dry and intact, no erythema ecchymosis no signs symptoms of infection.  No evidence of wound failure dehiscence.  Range of motion 0-120 degrees.  Stable to anterior-posterior varus and valgus stress.  Calf soft nontender.  Straight leg raise negative.    Pertinent New Results:    XRAY Report / Interpretation:       Assessment/Plan:      Stable status post left knee arthroscopy and cyst excision.  Patient is doing well in terms of range of motion.  Still has some mild residual pain.  We expect this to get better over time.  He will follow up with us on an as-needed basis.    Steven Gil,  "Physician Assistant, served in the capacity as a "scribe" for this patient encounter.  A "face-to-face" encounter occurred with Dr. Orlin Ochoa on this date.  The treatment plan and medical decision-making is outlined above. Patient was seen and examined with a chaperone.     This note was created using Dragon voice recognition software that occasionally misinterpreted phrases or words.        "

## 2024-04-04 ENCOUNTER — OFFICE VISIT (OUTPATIENT)
Dept: FAMILY MEDICINE | Facility: CLINIC | Age: 59
End: 2024-04-04
Payer: COMMERCIAL

## 2024-04-04 VITALS
OXYGEN SATURATION: 99 % | DIASTOLIC BLOOD PRESSURE: 82 MMHG | HEIGHT: 69 IN | SYSTOLIC BLOOD PRESSURE: 121 MMHG | BODY MASS INDEX: 23.11 KG/M2 | HEART RATE: 73 BPM | RESPIRATION RATE: 18 BRPM | WEIGHT: 156 LBS

## 2024-04-04 DIAGNOSIS — Z00.00 ROUTINE PHYSICAL EXAMINATION: ICD-10-CM

## 2024-04-04 DIAGNOSIS — N40.1 BENIGN PROSTATIC HYPERPLASIA WITH URINARY FREQUENCY: ICD-10-CM

## 2024-04-04 DIAGNOSIS — R35.0 BENIGN PROSTATIC HYPERPLASIA WITH URINARY FREQUENCY: ICD-10-CM

## 2024-04-04 DIAGNOSIS — E78.2 MIXED HYPERLIPIDEMIA: Primary | ICD-10-CM

## 2024-04-04 DIAGNOSIS — Z82.49 FAMILY HISTORY OF EARLY CAD: ICD-10-CM

## 2024-04-04 PROCEDURE — 3008F BODY MASS INDEX DOCD: CPT | Mod: CPTII,S$GLB,, | Performed by: PHYSICIAN ASSISTANT

## 2024-04-04 PROCEDURE — 1159F MED LIST DOCD IN RCRD: CPT | Mod: CPTII,S$GLB,, | Performed by: PHYSICIAN ASSISTANT

## 2024-04-04 PROCEDURE — 99214 OFFICE O/P EST MOD 30 MIN: CPT | Mod: S$GLB,,, | Performed by: PHYSICIAN ASSISTANT

## 2024-04-04 PROCEDURE — 3079F DIAST BP 80-89 MM HG: CPT | Mod: CPTII,S$GLB,, | Performed by: PHYSICIAN ASSISTANT

## 2024-04-04 PROCEDURE — 3074F SYST BP LT 130 MM HG: CPT | Mod: CPTII,S$GLB,, | Performed by: PHYSICIAN ASSISTANT

## 2024-04-04 RX ORDER — DUTASTERIDE 0.5 MG/1
0.5 CAPSULE, LIQUID FILLED ORAL DAILY
Qty: 90 CAPSULE | Refills: 1 | Status: SHIPPED | OUTPATIENT
Start: 2024-04-04 | End: 2024-10-01

## 2024-04-04 NOTE — PROGRESS NOTES
SUBJECTIVE:    Patient ID: Cornelius Couch is a 58 y.o. male.    Chief Complaint: Follow-up (4 month follow up// brought bottle//no refills needed// pt states he have no complaints today)    This is a 59 yo male who presents for regular checkup and refills. He reports doing well overall. Has recently had arthroscopy with Dr. Ochoa, partial meniscectomy. Feeling good with the knee. Has not required any PT. He has had an intentional weight loss. Breathing well.    Follow-up  Pertinent negatives include no abdominal pain, arthralgias, chest pain, congestion, coughing, fatigue, fever or weakness.       Lab Visit on 12/12/2023   Component Date Value Ref Range Status    Sodium 12/26/2023 140  136 - 145 mmol/L Final    Potassium 12/26/2023 4.0  3.5 - 5.1 mmol/L Final    Chloride 12/26/2023 106  95 - 110 mmol/L Final    CO2 12/26/2023 29  23 - 29 mmol/L Final    Glucose 12/26/2023 90  70 - 110 mg/dL Final    BUN 12/26/2023 10  6 - 20 mg/dL Final    Creatinine 12/26/2023 0.9  0.5 - 1.4 mg/dL Final    Calcium 12/26/2023 9.2  8.7 - 10.5 mg/dL Final    Total Protein 12/26/2023 6.9  6.0 - 8.4 g/dL Final    Albumin 12/26/2023 4.4  3.5 - 5.2 g/dL Final    Total Bilirubin 12/26/2023 0.3  0.1 - 1.0 mg/dL Final    Alkaline Phosphatase 12/26/2023 58  55 - 135 U/L Final    AST 12/26/2023 42 (H)  10 - 40 U/L Final    ALT 12/26/2023 68 (H)  10 - 44 U/L Final    eGFR 12/26/2023 >60.0  >60 mL/min/1.73 m^2 Final    Anion Gap 12/26/2023 5 (L)  8 - 16 mmol/L Final    WBC 12/26/2023 5.06  3.90 - 12.70 K/uL Final    RBC 12/26/2023 4.47 (L)  4.60 - 6.20 M/uL Final    Hemoglobin 12/26/2023 13.8 (L)  14.0 - 18.0 g/dL Final    Hematocrit 12/26/2023 42.0  40.0 - 54.0 % Final    MCV 12/26/2023 94  82 - 98 fL Final    MCH 12/26/2023 30.9  27.0 - 31.0 pg Final    MCHC 12/26/2023 32.9  32.0 - 36.0 g/dL Final    RDW 12/26/2023 13.1  11.5 - 14.5 % Final    Platelets 12/26/2023 310  150 - 450 K/uL Final    MPV 12/26/2023 9.8  9.2 - 12.9 fL Final     Immature Granulocytes 12/26/2023 0.4  0.0 - 0.5 % Final    Gran # (ANC) 12/26/2023 2.7  1.8 - 7.7 K/uL Final    Immature Grans (Abs) 12/26/2023 0.02  0.00 - 0.04 K/uL Final    Lymph # 12/26/2023 1.7  1.0 - 4.8 K/uL Final    Mono # 12/26/2023 0.5  0.3 - 1.0 K/uL Final    Eos # 12/26/2023 0.1  0.0 - 0.5 K/uL Final    Baso # 12/26/2023 0.08  0.00 - 0.20 K/uL Final    nRBC 12/26/2023 0  0 /100 WBC Final    Gran % 12/26/2023 53.3  38.0 - 73.0 % Final    Lymph % 12/26/2023 33.2  18.0 - 48.0 % Final    Mono % 12/26/2023 8.9  4.0 - 15.0 % Final    Eosinophil % 12/26/2023 2.6  0.0 - 8.0 % Final    Basophil % 12/26/2023 1.6  0.0 - 1.9 % Final    Differential Method 12/26/2023 Automated   Final       Past Medical History:   Diagnosis Date    Arthritis     Colon polyps     Encounter for blood transfusion 1976    GERD (gastroesophageal reflux disease)     Mixed hyperlipidemia 2022    PONV (postoperative nausea and vomiting) 1997     Past Surgical History:   Procedure Laterality Date    bone fusion Left 1977    ankle    COLONOSCOPY N/A 11/12/2018    Procedure: COLONOSCOPY;  Surgeon: Car Coats MD;  Location: [facility];  Service: Endoscopy;  Laterality: N/A;    COLONOSCOPY N/A 7/12/2021    Procedure: COLONOSCOPY;  Surgeon: Car Coats MD;  Location: [facility];  Service: Endoscopy;  Laterality: N/A;    COLONOSCOPY N/A 7/25/2022    Procedure: COLONOSCOPY;  Surgeon: Car Coats MD;  Location: [facility];  Service: Endoscopy;  Laterality: N/A;    ESOPHAGOGASTRODUODENOSCOPY N/A 9/16/2021    Procedure: EGD (ESOPHAGOGASTRODUODENOSCOPY);  Surgeon: Car Coats MD;  Location: [facility] ENDO;  Service: Endoscopy;  Laterality: N/A;    FRACTURE SURGERY      finger left pinky,     KNEE ARTHROSCOPY W/ MENISCECTOMY Left 12/27/2023    Procedure: ARTHROSCOPY, KNEE, WITH PARTIAL MENISCECTOMY;  Surgeon: Orlin Ochoa MD;  Location: [facility];  Service: Orthopedics;  Laterality: Left;    OPEN SURGICAL REMOVAL OF CYST OF KNEE Left  12/27/2023    Procedure: EXCISION, CYST, KNEE, OPEN;  Surgeon: Orlin Ochoa MD;  Location: [facility];  Service: Orthopedics;  Laterality: Left;  1. prepatellar cyst, 2. lateral knee cysts    TONSILLECTOMY       Family History   Problem Relation Age of Onset    Alzheimer's disease Mother     Heart disease Father        Marital Status:   Alcohol History:  reports current alcohol use of about 1.0 standard drink of alcohol per week.  Tobacco History:  reports that he quit smoking about 14 years ago. His smoking use included cigarettes. He started smoking about 44 years ago. He has a 45.0 pack-year smoking history. He has been exposed to tobacco smoke. He has never used smokeless tobacco.  Drug History:  reports current drug use. Drug: Marijuana.    Review of patient's allergies indicates:   Allergen Reactions    Codeine Itching     Anything with codeine    Adhesive Rash       Current Outpatient Medications:     ascorbic acid, vitamin C, (VITAMIN C) 100 MG tablet, Take 100 mg by mouth once daily., Disp: , Rfl:     rosuvastatin (CRESTOR) 20 MG tablet, Take 1 tablet (20 mg total) by mouth once daily., Disp: 90 tablet, Rfl: 3    vitamin D (VITAMIN D3) 1000 units Tab, Take 1,000 Units by mouth once daily., Disp: , Rfl:     dutasteride (AVODART) 0.5 mg capsule, Take 1 capsule (0.5 mg total) by mouth once daily., Disp: 90 capsule, Rfl: 1    Review of Systems   Constitutional:  Negative for activity change, fatigue, fever and unexpected weight change.   HENT:  Negative for congestion.    Respiratory:  Negative for apnea, cough, chest tightness and shortness of breath.    Cardiovascular:  Negative for chest pain and palpitations.   Gastrointestinal:  Negative for abdominal distention and abdominal pain.   Genitourinary:  Negative for difficulty urinating and dysuria.   Musculoskeletal:  Negative for arthralgias and back pain.   Neurological:  Negative for dizziness and weakness.          Objective:      Vitals:     "04/04/24 0821   BP: 121/82   Pulse: 73   Resp: 18   SpO2: 99%   Weight: 70.8 kg (156 lb)   Height: 5' 9" (1.753 m)     Physical Exam  Constitutional:       General: He is not in acute distress.     Appearance: He is well-developed.   HENT:      Head: Normocephalic and atraumatic.   Eyes:      Pupils: Pupils are equal, round, and reactive to light.   Neck:      Thyroid: No thyromegaly.   Cardiovascular:      Rate and Rhythm: Normal rate and regular rhythm.      Heart sounds: Normal heart sounds.   Pulmonary:      Effort: Pulmonary effort is normal.      Breath sounds: Normal breath sounds.   Abdominal:      General: Bowel sounds are normal. There is no distension.      Palpations: Abdomen is soft.      Tenderness: There is no abdominal tenderness.   Musculoskeletal:         General: Normal range of motion.      Cervical back: Normal range of motion and neck supple.   Skin:     General: Skin is warm and dry.      Findings: No erythema or rash.   Neurological:      Mental Status: He is alert and oriented to person, place, and time.      Cranial Nerves: No cranial nerve deficit.           Assessment:       1. Mixed hyperlipidemia    2. Benign prostatic hyperplasia with urinary frequency    3. Routine physical examination    4. Family history of early CAD         Plan:       Mixed hyperlipidemia  Comments:  reiterated the importance of taking the crestor every day and will plan to recheck full labs in about 6 mos.    Benign prostatic hyperplasia with urinary frequency  Comments:  doing well with current dose. maintain as is.  Orders:  -     dutasteride (AVODART) 0.5 mg capsule; Take 1 capsule (0.5 mg total) by mouth once daily.  Dispense: 90 capsule; Refill: 1    Routine physical examination  Comments:  Labs to be checked in 6 mos.    Family history of early CAD  Comments:  and with the CT showing calcium deposits definitely want to get further evaluation now.  Orders:  -     Exercise Stress - EKG; Future      No " follow-ups on file.        4/4/2024 Wilner Juarez PA-C

## 2024-05-02 ENCOUNTER — TELEPHONE (OUTPATIENT)
Dept: CARDIOLOGY | Facility: HOSPITAL | Age: 59
End: 2024-05-02

## 2024-05-02 NOTE — TELEPHONE ENCOUNTER
Patient advised, test will be at [facility] ([address] [facility]).   Will need to register on the first floor at the main entrance.   Patient advised that arrival time is 8:30am.  Patient advised, may take his medications prior to testing if you need to.    Advised if he needs to eat to take his medications, please keep it light, like toast and juice.    Patient advised to avoid all caffeine 12 hours prior to testing.  This includes decaf tea and coffee.    Wear comfortable clothing.   No lotions, oils, or powders to the upper chest area. May wear deodorant.    Patient verbalizes understanding of instructions.

## 2024-05-03 ENCOUNTER — HOSPITAL ENCOUNTER (OUTPATIENT)
Dept: CARDIOLOGY | Facility: HOSPITAL | Age: 59
Discharge: HOME | End: 2024-05-03
Attending: PHYSICIAN ASSISTANT
Payer: COMMERCIAL

## 2024-05-03 DIAGNOSIS — Z82.49 FAMILY HISTORY OF EARLY CAD: ICD-10-CM

## 2024-05-03 PROCEDURE — 93018 CV STRESS TEST I&R ONLY: CPT | Mod: ,,, | Performed by: INTERNAL MEDICINE

## 2024-05-03 PROCEDURE — 93016 CV STRESS TEST SUPVJ ONLY: CPT | Mod: ,,, | Performed by: NURSE PRACTITIONER

## 2024-05-03 PROCEDURE — 93017 CV STRESS TEST TRACING ONLY: CPT

## 2024-05-06 LAB
CV STRESS BASE HR: 84 BPM
DIASTOLIC BLOOD PRESSURE: 84 MMHG
OHS CV CPX 1 MINUTE RECOVERY HEART RATE: 106 BPM
OHS CV CPX 85 PERCENT MAX PREDICTED HEART RATE MALE: 137
OHS CV CPX ESTIMATED METS: 10
OHS CV CPX MAX PREDICTED HEART RATE: 161
OHS CV CPX PATIENT IS FEMALE: 0
OHS CV CPX PATIENT IS MALE: 1
OHS CV CPX PEAK DIASTOLIC BLOOD PRESSURE: 104 MMHG
OHS CV CPX PEAK HEAR RATE: 132 BPM
OHS CV CPX PEAK RATE PRESSURE PRODUCT: NORMAL
OHS CV CPX PEAK SYSTOLIC BLOOD PRESSURE: 220 MMHG
OHS CV CPX PERCENT MAX PREDICTED HEART RATE ACHIEVED: 82
OHS CV CPX RATE PRESSURE PRODUCT PRESENTING: NORMAL
STRESS ECHO POST EXERCISE DUR MIN: 8 MINUTES
STRESS ECHO POST EXERCISE DUR SEC: 54 SECONDS
STRESS ST DEPRESSION: 0.5 MM
SYSTOLIC BLOOD PRESSURE: 130 MMHG

## 2024-05-17 ENCOUNTER — OFFICE VISIT (OUTPATIENT)
Dept: FAMILY MEDICINE | Facility: CLINIC | Age: 59
End: 2024-05-17
Payer: COMMERCIAL

## 2024-05-17 VITALS
SYSTOLIC BLOOD PRESSURE: 140 MMHG | HEIGHT: 69 IN | DIASTOLIC BLOOD PRESSURE: 79 MMHG | BODY MASS INDEX: 22.96 KG/M2 | OXYGEN SATURATION: 97 % | WEIGHT: 155 LBS | RESPIRATION RATE: 18 BRPM | HEART RATE: 70 BPM

## 2024-05-17 DIAGNOSIS — I10 HYPERTENSION, UNSPECIFIED TYPE: ICD-10-CM

## 2024-05-17 DIAGNOSIS — E78.2 MIXED HYPERLIPIDEMIA: Primary | ICD-10-CM

## 2024-05-17 PROCEDURE — 3008F BODY MASS INDEX DOCD: CPT | Mod: CPTII,S$GLB,, | Performed by: PHYSICIAN ASSISTANT

## 2024-05-17 PROCEDURE — 1159F MED LIST DOCD IN RCRD: CPT | Mod: CPTII,S$GLB,, | Performed by: PHYSICIAN ASSISTANT

## 2024-05-17 PROCEDURE — 99214 OFFICE O/P EST MOD 30 MIN: CPT | Mod: S$GLB,,, | Performed by: PHYSICIAN ASSISTANT

## 2024-05-17 PROCEDURE — 3078F DIAST BP <80 MM HG: CPT | Mod: CPTII,S$GLB,, | Performed by: PHYSICIAN ASSISTANT

## 2024-05-17 PROCEDURE — 4010F ACE/ARB THERAPY RXD/TAKEN: CPT | Mod: CPTII,S$GLB,, | Performed by: PHYSICIAN ASSISTANT

## 2024-05-17 PROCEDURE — 3077F SYST BP >= 140 MM HG: CPT | Mod: CPTII,S$GLB,, | Performed by: PHYSICIAN ASSISTANT

## 2024-05-17 RX ORDER — LOSARTAN POTASSIUM 50 MG/1
50 TABLET ORAL DAILY
Qty: 90 TABLET | Refills: 1 | Status: SHIPPED | OUTPATIENT
Start: 2024-05-17 | End: 2024-11-13

## 2024-05-17 NOTE — PROGRESS NOTES
SUBJECTIVE:    Patient ID: Cornelius Couch is a 58 y.o. male.    Chief Complaint: Follow-up (No bottles// no refills needed// follow up on hypertension )    58 year old male presents for elevated blood pressure. He has been checking his blood pressure in the morning and evening on his left arm with readings in the 120s/mid-high 80s, when he checks his blood pressure standing up it is in the 140s/90s. He occasionally will get light headed if he goes from squatting to standing up quickly. Checked his blood pressure cuff on left arm with the cuff reading at 156/95 and manually at 146/94.    Follow-up  Pertinent negatives include no chest pain or headaches.       Hospital Outpatient Visit on 05/03/2024   Component Date Value Ref Range Status    85% Max Predicted HR 05/03/2024 137   Final    Max Predicted HR 05/03/2024 161   Final    [facility] CV CPX PATIENT IS MALE 05/03/2024 1.0   Final    [facility] CV CPX PATIENT IS FEMALE 05/03/2024 0.0   Final    HR at rest 05/03/2024 84  bpm Final    Systolic blood pressure 05/03/2024 130  mmHg Final    Diastolic blood pressure 05/03/2024 84  mmHg Final    RPP 05/03/2024 10,920   Final    Exercise duration (min) 05/03/2024 8  minutes Final    Exercise duration (sec) 05/03/2024 54  seconds Final    Peak HR 05/03/2024 132  bpm Final    Peak Systolic BP 05/03/2024 220  mmHg Final    Peak Diatolic BP 05/03/2024 104  mmHg Final    Peak RPP 05/03/2024 29,040   Final    Estimated METs 05/03/2024 10   Final    % Max HR Achieved 05/03/2024 82   Final    1 Minute Recovery HR 05/03/2024 106  bpm Final    ST Depression (mm) 05/03/2024 0.5  mm Final   Lab Visit on 12/12/2023   Component Date Value Ref Range Status    Sodium 12/26/2023 140  136 - 145 mmol/L Final    Potassium 12/26/2023 4.0  3.5 - 5.1 mmol/L Final    Chloride 12/26/2023 106  95 - 110 mmol/L Final    CO2 12/26/2023 29  23 - 29 mmol/L Final    Glucose 12/26/2023 90  70 - 110 mg/dL Final    BUN 12/26/2023 10  6 - 20 mg/dL Final     Creatinine 12/26/2023 0.9  0.5 - 1.4 mg/dL Final    Calcium 12/26/2023 9.2  8.7 - 10.5 mg/dL Final    Total Protein 12/26/2023 6.9  6.0 - 8.4 g/dL Final    Albumin 12/26/2023 4.4  3.5 - 5.2 g/dL Final    Total Bilirubin 12/26/2023 0.3  0.1 - 1.0 mg/dL Final    Alkaline Phosphatase 12/26/2023 58  55 - 135 U/L Final    AST 12/26/2023 42 (H)  10 - 40 U/L Final    ALT 12/26/2023 68 (H)  10 - 44 U/L Final    eGFR 12/26/2023 >60.0  >60 mL/min/1.73 m^2 Final    Anion Gap 12/26/2023 5 (L)  8 - 16 mmol/L Final    WBC 12/26/2023 5.06  3.90 - 12.70 K/uL Final    RBC 12/26/2023 4.47 (L)  4.60 - 6.20 M/uL Final    Hemoglobin 12/26/2023 13.8 (L)  14.0 - 18.0 g/dL Final    Hematocrit 12/26/2023 42.0  40.0 - 54.0 % Final    MCV 12/26/2023 94  82 - 98 fL Final    MCH 12/26/2023 30.9  27.0 - 31.0 pg Final    MCHC 12/26/2023 32.9  32.0 - 36.0 g/dL Final    RDW 12/26/2023 13.1  11.5 - 14.5 % Final    Platelets 12/26/2023 310  150 - 450 K/uL Final    MPV 12/26/2023 9.8  9.2 - 12.9 fL Final    Immature Granulocytes 12/26/2023 0.4  0.0 - 0.5 % Final    Gran # (ANC) 12/26/2023 2.7  1.8 - 7.7 K/uL Final    Immature Grans (Abs) 12/26/2023 0.02  0.00 - 0.04 K/uL Final    Lymph # 12/26/2023 1.7  1.0 - 4.8 K/uL Final    Mono # 12/26/2023 0.5  0.3 - 1.0 K/uL Final    Eos # 12/26/2023 0.1  0.0 - 0.5 K/uL Final    Baso # 12/26/2023 0.08  0.00 - 0.20 K/uL Final    nRBC 12/26/2023 0  0 /100 WBC Final    Gran % 12/26/2023 53.3  38.0 - 73.0 % Final    Lymph % 12/26/2023 33.2  18.0 - 48.0 % Final    Mono % 12/26/2023 8.9  4.0 - 15.0 % Final    Eosinophil % 12/26/2023 2.6  0.0 - 8.0 % Final    Basophil % 12/26/2023 1.6  0.0 - 1.9 % Final    Differential Method 12/26/2023 Automated   Final       Past Medical History:   Diagnosis Date    Arthritis     Colon polyps     Encounter for blood transfusion 1976    GERD (gastroesophageal reflux disease)     Mixed hyperlipidemia 2022    PONV (postoperative nausea and vomiting) 1997     Past Surgical History:    Procedure Laterality Date    bone fusion Left 1977    ankle    COLONOSCOPY N/A 11/12/2018    Procedure: COLONOSCOPY;  Surgeon: Car Coats MD;  Location: [facility] ENDO;  Service: Endoscopy;  Laterality: N/A;    COLONOSCOPY N/A 7/12/2021    Procedure: COLONOSCOPY;  Surgeon: Car Coats MD;  Location: [facility] ENDO;  Service: Endoscopy;  Laterality: N/A;    COLONOSCOPY N/A 7/25/2022    Procedure: COLONOSCOPY;  Surgeon: Car Coats MD;  Location: [facility] ENDO;  Service: Endoscopy;  Laterality: N/A;    ESOPHAGOGASTRODUODENOSCOPY N/A 9/16/2021    Procedure: EGD (ESOPHAGOGASTRODUODENOSCOPY);  Surgeon: Car Coats MD;  Location: [facility] ENDO;  Service: Endoscopy;  Laterality: N/A;    FRACTURE SURGERY      finger left pinky,     KNEE ARTHROSCOPY W/ MENISCECTOMY Left 12/27/2023    Procedure: ARTHROSCOPY, KNEE, WITH PARTIAL MENISCECTOMY;  Surgeon: Orlin Ochoa MD;  Location: [facility] OR;  Service: Orthopedics;  Laterality: Left;    OPEN SURGICAL REMOVAL OF CYST OF KNEE Left 12/27/2023    Procedure: EXCISION, CYST, KNEE, OPEN;  Surgeon: Orlin Ochoa MD;  Location: [facility] OR;  Service: Orthopedics;  Laterality: Left;  1. prepatellar cyst, 2. lateral knee cysts    TONSILLECTOMY       Family History   Problem Relation Name Age of Onset    Alzheimer's disease Mother      Heart disease Father         Marital Status:   Alcohol History:  reports current alcohol use of about 1.0 standard drink of alcohol per week.  Tobacco History:  reports that he quit smoking about 15 years ago. His smoking use included cigarettes. He started smoking about 45 years ago. He has a 45 pack-year smoking history. He has been exposed to tobacco smoke. He has never used smokeless tobacco.  Drug History:  reports current drug use. Drug: Marijuana.    Review of patient's allergies indicates:   Allergen Reactions    Codeine Itching     Anything with codeine    Adhesive Rash       Current Outpatient Medications:     ascorbic acid, vitamin C,  "(VITAMIN C) 100 MG tablet, Take 100 mg by mouth once daily., Disp: , Rfl:     dutasteride (AVODART) 0.5 mg capsule, Take 1 capsule (0.5 mg total) by mouth once daily., Disp: 90 capsule, Rfl: 1    rosuvastatin (CRESTOR) 20 MG tablet, Take 1 tablet (20 mg total) by mouth once daily., Disp: 90 tablet, Rfl: 3    losartan (COZAAR) 50 MG tablet, Take 1 tablet (50 mg total) by mouth once daily., Disp: 90 tablet, Rfl: 1    vitamin D (VITAMIN D3) 1000 units Tab, Take 1,000 Units by mouth once daily. (Patient not taking: Reported on 5/17/2024), Disp: , Rfl:     Review of Systems   Eyes:  Negative for visual disturbance.   Respiratory:  Negative for chest tightness and shortness of breath.    Cardiovascular:  Negative for chest pain and palpitations.   Neurological:  Negative for syncope, light-headedness and headaches.          Objective:      Vitals:    05/17/24 0851   BP: (!) 140/79   Pulse: 70   Resp: 18   SpO2: 97%   Weight: 70.3 kg (155 lb)   Height: 5' 9" (1.753 m)     Physical Exam  Constitutional:       Appearance: Normal appearance. He is normal weight.   HENT:      Head: Normocephalic and atraumatic.      Right Ear: External ear normal.      Left Ear: External ear normal.      Nose: Nose normal.   Eyes:      Extraocular Movements: Extraocular movements intact.   Cardiovascular:      Rate and Rhythm: Normal rate and regular rhythm.      Pulses: Normal pulses.      Heart sounds: Normal heart sounds.   Pulmonary:      Effort: Pulmonary effort is normal.      Breath sounds: Normal breath sounds.   Skin:     General: Skin is warm and dry.   Neurological:      Mental Status: He is alert and oriented to person, place, and time.           Assessment:       1. Mixed hyperlipidemia    2. e           Plan:       Mixed hyperlipidemia  Comments:  Continue current statin as is.  Encouraged compliance daily would like to recheck CMP and lipids prior to follow-up visit in three-month  Orders:  -     Lipid Panel; Future; Expected " date: 05/17/2024  -     COMPREHENSIVE METABOLIC PANEL; Future; Expected date: 05/17/2024    e  Comments:  Blood pressure running upper limits of normal at home logs reviewed.  Agreeable to low-dose losartan, continue to log and follow-up in three-month  Orders:  -     losartan (COZAAR) 50 MG tablet; Take 1 tablet (50 mg total) by mouth once daily.  Dispense: 90 tablet; Refill: 1      Problem List Items Addressed This Visit    None  Visit Diagnoses       Mixed hyperlipidemia    -  Primary    Continue current statin as is.  Encouraged compliance daily would like to recheck CMP and lipids prior to follow-up visit in three-month    Relevant Orders    Lipid Panel    COMPREHENSIVE METABOLIC PANEL    e        Blood pressure running upper limits of normal at home logs reviewed.  Agreeable to low-dose losartan, continue to log and follow-up in three-month    Relevant Medications    losartan (COZAAR) 50 MG tablet          Follow up for Will update via portal in 1-2 weeks.        5/17/2024 Wilner Juarez PA-C

## 2024-08-23 ENCOUNTER — TELEPHONE (OUTPATIENT)
Dept: PHARMACY | Facility: CLINIC | Age: 59
End: 2024-08-23
Payer: COMMERCIAL

## 2024-08-23 NOTE — TELEPHONE ENCOUNTER
[facility]/Population Health Chart Review & Patient Outreach Details For Medication Adherence Project    Reason for Outreach Encounter: 3rd Party payor non-compliance report ([employer], [employer], C, etc)  2.  Patient Outreach Method: myGreekhart message  3.   Medication in question: rosuvastatin 20 mg    LAST FILLED: 5/13/24 for 90 day supply  Hyperlipidemia Medications               rosuvastatin (CRESTOR) 20 MG tablet Take 1 tablet (20 mg total) by mouth once daily.               4.  Reviewed and or Updates Made To: Patient Chart  5. Outreach Outcomes and/or actions taken: Sent inquiry to patient: Waiting for response.

## 2024-09-13 ENCOUNTER — TELEPHONE (OUTPATIENT)
Dept: FAMILY MEDICINE | Facility: CLINIC | Age: 59
End: 2024-09-13
Payer: COMMERCIAL

## 2024-10-01 RX ORDER — OFLOXACIN 3 MG/ML
SOLUTION/ DROPS OPHTHALMIC
COMMUNITY
Start: 2024-08-07 | End: 2024-10-10

## 2024-10-10 ENCOUNTER — OFFICE VISIT (OUTPATIENT)
Dept: FAMILY MEDICINE | Facility: CLINIC | Age: 59
End: 2024-10-10
Payer: COMMERCIAL

## 2024-10-10 VITALS
BODY MASS INDEX: 22.81 KG/M2 | DIASTOLIC BLOOD PRESSURE: 68 MMHG | WEIGHT: 154 LBS | SYSTOLIC BLOOD PRESSURE: 132 MMHG | HEIGHT: 69 IN | RESPIRATION RATE: 18 BRPM | HEART RATE: 70 BPM | OXYGEN SATURATION: 97 %

## 2024-10-10 DIAGNOSIS — E78.2 MIXED HYPERLIPIDEMIA: Primary | ICD-10-CM

## 2024-10-10 DIAGNOSIS — E78.2 MIXED HYPERLIPIDEMIA: ICD-10-CM

## 2024-10-10 DIAGNOSIS — I10 ESSENTIAL HYPERTENSION: ICD-10-CM

## 2024-10-10 DIAGNOSIS — Z00.00 ROUTINE PHYSICAL EXAMINATION: ICD-10-CM

## 2024-10-10 RX ORDER — ROSUVASTATIN CALCIUM 20 MG/1
20 TABLET, COATED ORAL DAILY
Qty: 90 TABLET | Refills: 3 | Status: SHIPPED | OUTPATIENT
Start: 2024-10-10 | End: 2025-10-10

## 2024-10-10 NOTE — PROGRESS NOTES
SUBJECTIVE:    Patient ID: Cornelius Couch is a 58 y.o. male.    Chief Complaint: Follow-up (No bottles// no refills needed// lab results//pt states he have no complaints today //pt refused flu vaccine )    This is a 58-year-old male who presents today for regular follow-up.  Added losartan at last visit for elevated blood pressure and he has not been taking. Also not taking the cholesterol medication. He does note the cholesterol elevation and is open to starting back.    Follow-up  Pertinent negatives include no chest pain or headaches.       Office Visit on 05/17/2024   Component Date Value Ref Range Status    Cholesterol 10/04/2024 247 (H)  <200 mg/dL Final    HDL 10/04/2024 57  > OR = 40 mg/dL Final    Triglycerides 10/04/2024 71  <150 mg/dL Final    LDL Cholesterol 10/04/2024 173 (H)  mg/dL (calc) Final    HDL/Cholesterol Ratio 10/04/2024 4.3  <5.0 (calc) Final    Non HDL Chol. (LDL+VLDL) 10/04/2024 190 (H)  <130 mg/dL (calc) Final    Glucose 10/04/2024 106 (H)  65 - 99 mg/dL Final    BUN 10/04/2024 12  7 - 25 mg/dL Final    Creatinine 10/04/2024 1.01  0.70 - 1.30 mg/dL Final    eGFR 10/04/2024 86  > OR = 60 mL/min/1.73m2 Final    BUN/Creatinine Ratio 10/04/2024 SEE NOTE:  6 - 22 (calc) Final    Sodium 10/04/2024 141  135 - 146 mmol/L Final    Potassium 10/04/2024 4.4  3.5 - 5.3 mmol/L Final    Chloride 10/04/2024 108  98 - 110 mmol/L Final    CO2 10/04/2024 27  20 - 32 mmol/L Final    Calcium 10/04/2024 9.2  8.6 - 10.3 mg/dL Final    Total Protein 10/04/2024 6.5  6.1 - 8.1 g/dL Final    Albumin 10/04/2024 4.1  3.6 - 5.1 g/dL Final    Globulin, Total 10/04/2024 2.4  1.9 - 3.7 g/dL (calc) Final    Albumin/Globulin Ratio 10/04/2024 1.7  1.0 - 2.5 (calc) Final    Total Bilirubin 10/04/2024 0.3  0.2 - 1.2 mg/dL Final    Alkaline Phosphatase 10/04/2024 58  35 - 144 U/L Final    AST 10/04/2024 12  10 - 35 U/L Final    ALT 10/04/2024 10  9 - 46 U/L Final   Hospital Outpatient Visit on 05/03/2024   Component Date  Value Ref Range Status    85% Max Predicted HR 05/03/2024 137   Final    Max Predicted HR 05/03/2024 161   Final    [facility] CV CPX PATIENT IS MALE 05/03/2024 1.0   Final    [facility] CV CPX PATIENT IS FEMALE 05/03/2024 0.0   Final    HR at rest 05/03/2024 84  bpm Final    Systolic blood pressure 05/03/2024 130  mmHg Final    Diastolic blood pressure 05/03/2024 84  mmHg Final    RPP 05/03/2024 10,920   Final    Exercise duration (min) 05/03/2024 8  minutes Final    Exercise duration (sec) 05/03/2024 54  seconds Final    Peak HR 05/03/2024 132  bpm Final    Peak Systolic BP 05/03/2024 220  mmHg Final    Peak Diatolic BP 05/03/2024 104  mmHg Final    Peak RPP 05/03/2024 29,040   Final    Estimated METs 05/03/2024 10   Final    % Max HR Achieved 05/03/2024 82   Final    1 Minute Recovery HR 05/03/2024 106  bpm Final    ST Depression (mm) 05/03/2024 0.5  mm Final       Past Medical History:   Diagnosis Date    Arthritis     Colon polyps     Encounter for blood transfusion 1976    GERD (gastroesophageal reflux disease)     Mixed hyperlipidemia 2022    PONV (postoperative nausea and vomiting) 1997     Past Surgical History:   Procedure Laterality Date    bone fusion Left 1977    ankle    COLONOSCOPY N/A 11/12/2018    Procedure: COLONOSCOPY;  Surgeon: Car Coats MD;  Location: [facility];  Service: Endoscopy;  Laterality: N/A;    COLONOSCOPY N/A 7/12/2021    Procedure: COLONOSCOPY;  Surgeon: Car Coats MD;  Location: [facility];  Service: Endoscopy;  Laterality: N/A;    COLONOSCOPY N/A 7/25/2022    Procedure: COLONOSCOPY;  Surgeon: Car Coats MD;  Location: [facility] ENDO;  Service: Endoscopy;  Laterality: N/A;    ESOPHAGOGASTRODUODENOSCOPY N/A 9/16/2021    Procedure: EGD (ESOPHAGOGASTRODUODENOSCOPY);  Surgeon: Car Coats MD;  Location: [facility];  Service: Endoscopy;  Laterality: N/A;    FRACTURE SURGERY      finger left pinky,     KNEE ARTHROSCOPY W/ MENISCECTOMY Left 12/27/2023    Procedure: ARTHROSCOPY, KNEE,  "WITH PARTIAL MENISCECTOMY;  Surgeon: Orlin Ochoa MD;  Location: [facility] OR;  Service: Orthopedics;  Laterality: Left;    OPEN SURGICAL REMOVAL OF CYST OF KNEE Left 12/27/2023    Procedure: EXCISION, CYST, KNEE, OPEN;  Surgeon: Orlin Ochoa MD;  Location: [facility] OR;  Service: Orthopedics;  Laterality: Left;  1. prepatellar cyst, 2. lateral knee cysts    TONSILLECTOMY       Family History   Problem Relation Name Age of Onset    Alzheimer's disease Mother      Heart disease Father         Marital Status:   Alcohol History:  reports current alcohol use of about 1.0 standard drink of alcohol per week.  Tobacco History:  reports that he quit smoking about 15 years ago. His smoking use included cigarettes. He started smoking about 45 years ago. He has a 45 pack-year smoking history. He has been exposed to tobacco smoke. He has never used smokeless tobacco.  Drug History:  reports current drug use. Drug: Marijuana.    Review of patient's allergies indicates:   Allergen Reactions    Codeine Itching     Anything with codeine    Adhesive Rash       Current Outpatient Medications:     rosuvastatin (CRESTOR) 20 MG tablet, Take 1 tablet (20 mg total) by mouth once daily., Disp: 90 tablet, Rfl: 3    Review of Systems   Eyes:  Negative for visual disturbance.   Respiratory:  Negative for chest tightness and shortness of breath.    Cardiovascular:  Negative for chest pain and palpitations.   Neurological:  Negative for syncope, light-headedness and headaches.          Objective:      Vitals:    10/10/24 0726   BP: 132/68   Pulse: 70   Resp: 18   SpO2: 97%   Weight: 69.9 kg (154 lb)   Height: 5' 9" (1.753 m)     Physical Exam  Constitutional:       Appearance: Normal appearance. He is normal weight.   HENT:      Head: Normocephalic and atraumatic.      Right Ear: External ear normal.      Left Ear: External ear normal.      Nose: Nose normal.   Eyes:      Extraocular Movements: Extraocular movements intact.   Cardiovascular: "      Rate and Rhythm: Normal rate and regular rhythm.      Pulses: Normal pulses.      Heart sounds: Normal heart sounds.   Pulmonary:      Effort: Pulmonary effort is normal.      Breath sounds: Normal breath sounds.   Skin:     General: Skin is warm and dry.   Neurological:      Mental Status: He is alert and oriented to person, place, and time.           Assessment:       1. Mixed hyperlipidemia    2. Routine physical examination    3. Essential hypertension    4. Mixed hyperlipidemia         Plan:       Mixed hyperlipidemia  Comments:  agreeable to starting back on statin. will recheck lipid/cmp with scheduled labs before annual visit.  Orders:  -     rosuvastatin (CRESTOR) 20 MG tablet; Take 1 tablet (20 mg total) by mouth once daily.  Dispense: 90 tablet; Refill: 3    Routine physical examination  Comments:  labs reviewed. watch the diet- carbs and sugars and if continues to elevate will get A1c    Essential hypertension  Comments:  well managed. continue as is. off losartan.    Mixed hyperlipidemia  Comments:  Discussed with patient the importance of taking his medication to lower his cholestrol. Patient stated he will start taking it regularly. Will continue as is.  Orders:  -     rosuvastatin (CRESTOR) 20 MG tablet; Take 1 tablet (20 mg total) by mouth once daily.  Dispense: 90 tablet; Refill: 3      Follow up in about 6 months (around 4/10/2025) for Annual Physical.        10/10/2024 Wilner Juarez PA-C

## 2024-11-14 ENCOUNTER — TELEPHONE (OUTPATIENT)
Dept: PHARMACY | Facility: CLINIC | Age: 59
End: 2024-11-14
Payer: COMMERCIAL

## 2024-11-14 NOTE — TELEPHONE ENCOUNTER
[facility]/Population Health Chart Review & Patient Outreach Details For Medication Adherence Project    Reason for Outreach Encounter: 3rd Party payor non-compliance report ([employer], [employer], [employer], etc)  2.  Patient Outreach Method: Reviewed patient chart  and [employer] message  3.   Medication in question:   Hyperlipidemia Medications               rosuvastatin (CRESTOR) 20 MG tablet Take 1 tablet (20 mg total) by mouth once daily.                  rosuvastatin  last filled  5/13 for 90 day supply      4.  Reviewed and or Updates Made To: Patient Chart  5. Outreach Outcomes and/or actions taken: Sent inquiry to patient: Waiting for response  Additional Notes:

## 2024-11-14 NOTE — TELEPHONE ENCOUNTER
[facility]Population [facility] Chart Review & Patient Outreach Details For Medication Adherence Project    Reason for Outreach Encounter: 3rd Party payor non-compliance report ([employer], BCBS, [employer], etc)  2.  Patient Outreach Method: Reviewed patient chart   3.   Medication in question:   Hyperlipidemia Medications               rosuvastatin (CRESTOR) 20 MG tablet Take 1 tablet (20 mg total) by mouth once daily.                  rosuvastatin  last filled  10/10 for 90 day supply  ^refreshed previous inquiry    4.  Reviewed and or Updates Made To: Patient Chart  5. Outreach Outcomes and/or actions taken: Patient filled medication and is on track to be adherent  Additional Notes:

## 2025-01-17 ENCOUNTER — TELEPHONE (OUTPATIENT)
Dept: PHARMACY | Facility: CLINIC | Age: 60
End: 2025-01-17
Payer: COMMERCIAL

## 2025-01-17 NOTE — TELEPHONE ENCOUNTER
[facility]Population Health Chart Review & Patient Outreach Details For Medication Adherence Project    Reason for Outreach Encounter: 3rd Party payor non-compliance report ([employer], [employer], C, etc)  2.  Patient Outreach Method: Snapthart message  3.   Medication in question: rosuvastatin   LAST FILLED: 10/10/24 for 90 day supply  Hyperlipidemia Medications               rosuvastatin (CRESTOR) 20 MG tablet Take 1 tablet (20 mg total) by mouth once daily.               4.  Reviewed and or Updates Made To: Patient Chart  5. Outreach Outcomes and/or actions taken: Sent inquiry to patient: Waiting for response.

## 2025-02-21 ENCOUNTER — TELEPHONE (OUTPATIENT)
Dept: PHARMACY | Facility: CLINIC | Age: 60
End: 2025-02-21
Payer: COMMERCIAL

## 2025-02-21 NOTE — TELEPHONE ENCOUNTER
[facility]Population [facility] Chart Review & Patient Outreach Details For Medication Adherence Project    Reason for Outreach Encounter: 3rd Party payor non-compliance report ([employer], [employer], C, etc)  2.  Patient Outreach Method: Reviewed Patient Chart  3.   Medication in question: rosuvastatin    LAST FILLED: 2/6/25 for 90 day supply  Hyperlipidemia Medications              rosuvastatin (CRESTOR) 20 MG tablet Take 1 tablet (20 mg total) by mouth once daily.               4.  Reviewed and or Updates Made To: Patient Chart  5. Outreach Outcomes and/or actions taken: Patient filled medication and is on track to be adherent

## 2025-03-18 ENCOUNTER — TELEPHONE (OUTPATIENT)
Dept: FAMILY MEDICINE | Facility: CLINIC | Age: 60
End: 2025-03-18
Payer: COMMERCIAL

## 2025-03-18 DIAGNOSIS — Z79.899 ENCOUNTER FOR LONG-TERM (CURRENT) USE OF MEDICATIONS: Primary | ICD-10-CM

## 2025-03-18 DIAGNOSIS — I10 ESSENTIAL HYPERTENSION: ICD-10-CM

## 2025-03-18 DIAGNOSIS — Z12.5 SPECIAL SCREENING FOR MALIGNANT NEOPLASM OF PROSTATE: ICD-10-CM

## 2025-03-18 DIAGNOSIS — Z00.00 ROUTINE PHYSICAL EXAMINATION: ICD-10-CM

## 2025-03-18 DIAGNOSIS — E78.2 MIXED HYPERLIPIDEMIA: ICD-10-CM

## 2025-03-18 DIAGNOSIS — E55.9 VITAMIN D DEFICIENCY: ICD-10-CM

## 2025-03-25 RX ORDER — CEPHALEXIN 500 MG/1
500 CAPSULE ORAL 2 TIMES DAILY
COMMUNITY
Start: 2025-03-10 | End: 2025-04-04

## 2025-03-25 RX ORDER — MUPIROCIN 20 MG/G
OINTMENT TOPICAL 2 TIMES DAILY
COMMUNITY
Start: 2025-03-10 | End: 2025-04-04

## 2025-03-28 ENCOUNTER — TELEPHONE (OUTPATIENT)
Dept: PHARMACY | Facility: CLINIC | Age: 60
End: 2025-03-28
Payer: COMMERCIAL

## 2025-03-28 NOTE — TELEPHONE ENCOUNTER
[facility]Population Health Chart Review & Patient Outreach Details For Medication Adherence Project    Reason for Outreach Encounter: 3rd Party payor non-compliance report ([employer], BCBS, [employer], etc)  2.  Patient Outreach Method: Reviewed patient chart   3.   Medication in question:    Hyperlipidemia Medications              rosuvastatin (CRESTOR) 20 MG tablet Take 1 tablet (20 mg total) by mouth once daily.                  rosuvastatin  last filled  2/6 for 90 day supply      4.  Reviewed and or Updates Made To: Patient Chart  5. Outreach Outcomes and/or actions taken: Patient filled medication and is on track to be adherent  Additional Notes:

## 2025-04-04 ENCOUNTER — OFFICE VISIT (OUTPATIENT)
Dept: FAMILY MEDICINE | Facility: CLINIC | Age: 60
End: 2025-04-04
Payer: COMMERCIAL

## 2025-04-04 VITALS
BODY MASS INDEX: 23.25 KG/M2 | HEIGHT: 69 IN | RESPIRATION RATE: 18 BRPM | DIASTOLIC BLOOD PRESSURE: 76 MMHG | WEIGHT: 157 LBS | SYSTOLIC BLOOD PRESSURE: 138 MMHG | HEART RATE: 71 BPM | OXYGEN SATURATION: 97 %

## 2025-04-04 DIAGNOSIS — E78.2 MIXED HYPERLIPIDEMIA: ICD-10-CM

## 2025-04-04 DIAGNOSIS — N40.1 BENIGN PROSTATIC HYPERPLASIA WITH URINARY FREQUENCY: Primary | ICD-10-CM

## 2025-04-04 DIAGNOSIS — R35.0 BENIGN PROSTATIC HYPERPLASIA WITH URINARY FREQUENCY: Primary | ICD-10-CM

## 2025-04-04 DIAGNOSIS — L23.7 POISON IVY DERMATITIS: ICD-10-CM

## 2025-04-04 RX ORDER — DUTASTERIDE 0.5 MG/1
0.5 CAPSULE, LIQUID FILLED ORAL DAILY
Qty: 90 CAPSULE | Refills: 3 | Status: SHIPPED | OUTPATIENT
Start: 2025-04-04 | End: 2026-04-04

## 2025-04-04 RX ORDER — FLUOCINONIDE 0.5 MG/G
OINTMENT TOPICAL 2 TIMES DAILY
Qty: 60 G | Refills: 1 | Status: SHIPPED | OUTPATIENT
Start: 2025-04-04 | End: 2025-07-03

## 2025-04-04 RX ORDER — DUTASTERIDE 0.5 MG/1
0.5 CAPSULE, LIQUID FILLED ORAL DAILY
COMMUNITY
End: 2025-04-04 | Stop reason: SDUPTHER

## 2025-04-04 RX ORDER — ROSUVASTATIN CALCIUM 20 MG/1
20 TABLET, COATED ORAL DAILY
Qty: 90 TABLET | Refills: 3 | Status: SHIPPED | OUTPATIENT
Start: 2025-04-04 | End: 2026-04-04

## 2025-04-04 NOTE — PROGRESS NOTES
SUBJECTIVE:    Patient ID: Cornelius Couch is a 59 y.o. male.    Chief Complaint: Annual Exam (Annual visit// no bottles// refills needed//pt states he possibly have poison ivy states he was doing yard work 1 week ago and started breaking out the day after )    History of Present Illness    CHIEF COMPLAINT:  Cornelius presents today for follow up    DERMATOLOGY:  He presents with a very itchy poison ivy rash on both arms. He has been using OTC holistic creams obtained from a local street fair. He notes the current symptoms are less severe compared to childhood experiences.    CARDIOVASCULAR:  Stress test from last year was completed successfully, though he experienced significant blood pressure elevation towards the end of the exam. He denies any difficulties during the initial stages of the test.    FAMILY HISTORY:  Notable for heart conditions on paternal side, with father  from myocardial infarction.    LABS:  LDL has decreased from 173 to 75. Thyroid function is stable. Blood counts show improvement from anemia noted one year ago. Kidney function is at 100. Liver function tests are normal, improved from previous elevation several years ago. PSA remains low. Vitamin D is slightly low at 28 (normal >30).      ROS:  General: -fever, -chills, -fatigue, -weight gain, -weight loss  Eyes: -vision changes, -redness, -discharge  ENT: -ear pain, -nasal congestion, -sore throat  Cardiovascular: -chest pain, -palpitations, -lower extremity edema  Respiratory: -cough, -shortness of breath  Gastrointestinal: -abdominal pain, -nausea, -vomiting, -diarrhea, -constipation, -blood in stool  Genitourinary: -dysuria, -hematuria, -frequency  Musculoskeletal: -joint pain, -muscle pain  Skin: +rash, +lesion, +itching  Neurological: -headache, -dizziness, -numbness, -tingling  Psychiatric: -anxiety, -depression, -sleep difficulty         Telephone on 2025   Component Date Value Ref Range Status    Color, UA 2025  YELLOW  YELLOW Final    Appearance, UA 03/26/2025 CLEAR  CLEAR Final    Specific Gravity, UA 03/26/2025 1.023  1.001 - 1.035 Final    pH, UA 03/26/2025 5.5  5.0 - 8.0 Final    Glucose, UA 03/26/2025 NEGATIVE  NEGATIVE Final    Bilirubin, UA 03/26/2025 NEGATIVE  NEGATIVE Final    Ketones, UA 03/26/2025 NEGATIVE  NEGATIVE Final    Occult Blood UA 03/26/2025 NEGATIVE  NEGATIVE Final    Protein, UA 03/26/2025 NEGATIVE  NEGATIVE Final    Nitrite, UA 03/26/2025 NEGATIVE  NEGATIVE Final    Leukocytes, UA 03/26/2025 NEGATIVE  NEGATIVE Final    WBC Casts, UA 03/26/2025 NONE SEEN  < OR = 5 /HPF Final    RBC Casts, UA 03/26/2025 NONE SEEN  < OR = 2 /HPF Final    Squam Epithel, UA 03/26/2025 NONE SEEN  < OR = 5 /HPF Final    Bacteria, UA 03/26/2025 NONE SEEN  NONE SEEN /HPF Final    Hyaline Casts, UA 03/26/2025 NONE SEEN  NONE SEEN /LPF Final    Service Cmt: 03/26/2025 SEE COMMENT   Final    Reflexive Urine Culture 03/26/2025 SEE COMMENT   Final    TSH w/reflex to FT4 03/26/2025 1.09  0.40 - 4.50 mIU/L Final    Cholesterol 03/26/2025 142  <200 mg/dL Final    HDL 03/26/2025 53  > OR = 40 mg/dL Final    Triglycerides 03/26/2025 68  <150 mg/dL Final    LDL Cholesterol 03/26/2025 75  mg/dL (calc) Final    HDL/Cholesterol Ratio 03/26/2025 2.7  <5.0 (calc) Final    Non HDL Chol. (LDL+VLDL) 03/26/2025 89  <130 mg/dL (calc) Final    WBC 03/26/2025 6.1  3.8 - 10.8 Thousand/uL Final    RBC 03/26/2025 4.65  4.20 - 5.80 Million/uL Final    Hemoglobin 03/26/2025 14.4  13.2 - 17.1 g/dL Final    Hematocrit 03/26/2025 44.6  38.5 - 50.0 % Final    MCV 03/26/2025 95.9  80.0 - 100.0 fL Final    MCH 03/26/2025 31.0  27.0 - 33.0 pg Final    MCHC 03/26/2025 32.3  32.0 - 36.0 g/dL Final    RDW 03/26/2025 12.2  11.0 - 15.0 % Final    Platelets 03/26/2025 278  140 - 400 Thousand/uL Final    MPV 03/26/2025 9.9  7.5 - 12.5 fL Final    Neutrophils, Abs 03/26/2025 3,971  1,500 - 7,800 cells/uL Final    Lymph # 03/26/2025 1,452  850 - 3,900 cells/uL Final     Mono # 03/26/2025 470  200 - 950 cells/uL Final    Eos # 03/26/2025 146  15 - 500 cells/uL Final    Baso # 03/26/2025 61  0 - 200 cells/uL Final    Neutrophils Relative 03/26/2025 65.1  % Final    Lymph % 03/26/2025 23.8  % Final    Mono % 03/26/2025 7.7  % Final    Eosinophil % 03/26/2025 2.4  % Final    Basophil % 03/26/2025 1.0  % Final    Glucose 03/26/2025 98  65 - 99 mg/dL Final    BUN 03/26/2025 15  7 - 25 mg/dL Final    Creatinine 03/26/2025 0.82  0.70 - 1.35 mg/dL Final    eGFR 03/26/2025 101  > OR = 60 mL/min/1.73m2 Final    BUN/Creatinine Ratio 03/26/2025 SEE NOTE:  6 - 22 (calc) Final    Sodium 03/26/2025 142  135 - 146 mmol/L Final    Potassium 03/26/2025 4.3  3.5 - 5.3 mmol/L Final    Chloride 03/26/2025 109  98 - 110 mmol/L Final    CO2 03/26/2025 26  20 - 32 mmol/L Final    Calcium 03/26/2025 9.1  8.6 - 10.3 mg/dL Final    Total Protein 03/26/2025 6.3  6.1 - 8.1 g/dL Final    Albumin 03/26/2025 4.2  3.6 - 5.1 g/dL Final    Globulin, Total 03/26/2025 2.1  1.9 - 3.7 g/dL (calc) Final    Albumin/Globulin Ratio 03/26/2025 2.0  1.0 - 2.5 (calc) Final    Total Bilirubin 03/26/2025 0.4  0.2 - 1.2 mg/dL Final    Alkaline Phosphatase 03/26/2025 51  35 - 144 U/L Final    AST 03/26/2025 19  10 - 35 U/L Final    ALT 03/26/2025 17  9 - 46 U/L Final    PROSTATE SPECIFIC ANTIGEN, SCR - Q* 03/26/2025 0.80  < OR = 4.00 ng/mL Final    Vitamin D, 25-OH, Total 03/26/2025 28 (L)  30 - 100 ng/mL Final       Past Medical History:   Diagnosis Date    Arthritis     Colon polyps     Encounter for blood transfusion 1976    GERD (gastroesophageal reflux disease)     Mixed hyperlipidemia 2022    PONV (postoperative nausea and vomiting) 1997     Past Surgical History:   Procedure Laterality Date    bone fusion Left 1977    ankle    COLONOSCOPY N/A 11/12/2018    Procedure: COLONOSCOPY;  Surgeon: Car Coats MD;  Location: [facility];  Service: Endoscopy;  Laterality: N/A;    COLONOSCOPY N/A 7/12/2021    Procedure:  "COLONOSCOPY;  Surgeon: Car Coats MD;  Location: [facility] ENDO;  Service: Endoscopy;  Laterality: N/A;    COLONOSCOPY N/A 7/25/2022    Procedure: COLONOSCOPY;  Surgeon: Car Coats MD;  Location: [facility] ENDO;  Service: Endoscopy;  Laterality: N/A;    ESOPHAGOGASTRODUODENOSCOPY N/A 9/16/2021    Procedure: EGD (ESOPHAGOGASTRODUODENOSCOPY);  Surgeon: Car Coats MD;  Location: [facility] ENDO;  Service: Endoscopy;  Laterality: N/A;    FRACTURE SURGERY      finger left pinky,     KNEE ARTHROSCOPY W/ MENISCECTOMY Left 12/27/2023    Procedure: ARTHROSCOPY, KNEE, WITH PARTIAL MENISCECTOMY;  Surgeon: Orlin Ochoa MD;  Location: [facility] OR;  Service: Orthopedics;  Laterality: Left;    OPEN SURGICAL REMOVAL OF CYST OF KNEE Left 12/27/2023    Procedure: EXCISION, CYST, KNEE, OPEN;  Surgeon: Orlin Ochoa MD;  Location: [facility] OR;  Service: Orthopedics;  Laterality: Left;  1. prepatellar cyst, 2. lateral knee cysts    TONSILLECTOMY       Family History   Problem Relation Name Age of Onset    Alzheimer's disease Mother      Heart disease Father         Marital Status:   Alcohol History:  reports current alcohol use of about 1.0 standard drink of alcohol per week.  Tobacco History:  reports that he quit smoking about 15 years ago. His smoking use included cigarettes. He started smoking about 45 years ago. He has a 45 pack-year smoking history. He has been exposed to tobacco smoke. He has never used smokeless tobacco.  Drug History:  reports current drug use. Drug: Marijuana.    Review of patient's allergies indicates:   Allergen Reactions    Codeine Itching     Anything with codeine    Adhesive Rash     Current Medications[1]    Objective:      Vitals:    04/04/25 0807   BP: 138/76   Pulse: 71   Resp: 18   SpO2: 97%   Weight: 71.2 kg (157 lb)   Height: 5' 9" (1.753 m)     Physical Exam    General: No acute distress. Well-developed. Well-nourished.  Eyes: EOMI. Sclerae anicteric.  HENT: Normocephalic. Atraumatic. Nares " patent. Moist oral mucosa.  Ears: Bilateral TMs clear. Bilateral EACs clear.  Cardiovascular: Regular rate. Regular rhythm. No murmurs. No rubs. No gallops. Normal S1, S2.  Respiratory: Normal respiratory effort. Clear to auscultation bilaterally. No rales. No rhonchi. No wheezing.  Abdomen: Soft. Non-tender. Non-distended. Normoactive bowel sounds.  Musculoskeletal: No  obvious deformity.  Extremities: No lower extremity edema.  Neurological: Alert & oriented x3. No slurred speech. Normal gait.  Psychiatric: Normal mood. Normal affect. Good insight. Good judgment.  Skin: Warm. Dry. No rash.         Assessment:       Assessment & Plan    - Cholesterol significantly improved with statin therapy, reducing LDL from 173 to 75.  - Initiated statin treatment due to family history of heart disease and to stabilize potential plaque.  - CBC shows improvement in previously noted mild anemia.  - Renal function optimal, indicating good hydration.  - Liver function tests normal, resolving previous slight elevation.  - PSA appropriately low.  - Vitamin D slightly low at 28, but improved from previous levels.  - Considered re-evaluation of cardiac status in 1-2 years, given previous stress test results showing elevated BP response without signs of ischemia.    POISON IVY (IRRITANT CONTACT DERMATITIS DUE TO PLANTS):  - Educated the patient about poison ivy: active toxin period of approximately 24 hours, potential for transfer, similarity in toxins among poison ivy, oak, and sumac, noting individual variation in reactivity, and importance of prompt washing with cold water after exposure. Instructed the patient to rinse affected area with cold water as soon as possible after contact with poison ivy. Advised to avoid applying steroid cream to face, groin, or other areas with thin skin. Prescribed Lidex ointment (strong steroid cream) for poison ivy treatment as needed. Observed poison ivy rash on the patient's arms and both sides,  which is itchy but not as severe as patient's childhood experiences.    HYPERLIPIDEMIA:  - Continued Rosuvastatin (Crestor) for cholesterol management. Noted significant improvement in the patient's cholesterol levels, with LDL dropping from 173 to 75. Assessed the hyperlipidemia as likely familial based on family history of heart conditions. Explained the benefits of statin treatment in stabilizing plaque and reducing blockage risk.    BENIGN PROSTATIC HYPERPLASIA:  - Restarted Dutasteride 1 capsule daily for prostate management for a full year. Noted improvement in urinary symptoms after taking prescribed medication and low PSA levels. Explained the long-term benefits of the prescribed medication for prostate size.    VITAMIN D DEFICIENCY:  - Noted the patient's vitamin D levels are slightly low at 28, with normal being around 30. Anticipated improvement in vitamin D levels with increased sun exposure during summer.    FAMILY HISTORY OF HEART DISEASE:  - Noted the patient's family history of heart conditions, including father's death from heart attack. Acknowledged family history as a factor in treatment decisions.    HISTORY OF ANEMIA:  - Noted the patient's history of slight anemia a year ago.    FOLLOW-UP:  - Follow up in 1 year. Contact the office if any concerns arise before next scheduled appointment.       Plan:       Benign prostatic hyperplasia with urinary frequency  -     dutasteride (AVODART) 0.5 mg capsule; Take 1 capsule (0.5 mg total) by mouth once daily.  Dispense: 90 capsule; Refill: 3    Mixed hyperlipidemia  Comments:  Discussed with patient the importance of taking his medication to lower his cholestrol. Patient stated he will start taking it regularly. Will continue as is.  Orders:  -     rosuvastatin (CRESTOR) 20 MG tablet; Take 1 tablet (20 mg total) by mouth once daily.  Dispense: 90 tablet; Refill: 3    Poison ivy dermatitis  -     fluocinonide (LIDEX) 0.05 % ointment; Apply topically 2  (two) times daily.  Dispense: 60 g; Refill: 1      Follow up in about 1 year (around 4/4/2026) for Annual Physical.    This note was generated with the assistance of ambient listening technology. Verbal consent was obtained by the patient and accompanying visitor(s) for the recording of patient appointment to facilitate this note. I attest to having reviewed and edited the generated note for accuracy, though some syntax or spelling errors may persist. Please contact the author of this note for any clarification.          4/4/2025 Wilner Juarez PA-C           [1]   Current Outpatient Medications:     dutasteride (AVODART) 0.5 mg capsule, Take 1 capsule (0.5 mg total) by mouth once daily., Disp: 90 capsule, Rfl: 3    fluocinonide (LIDEX) 0.05 % ointment, Apply topically 2 (two) times daily., Disp: 60 g, Rfl: 1    rosuvastatin (CRESTOR) 20 MG tablet, Take 1 tablet (20 mg total) by mouth once daily., Disp: 90 tablet, Rfl: 3

## 2025-06-10 ENCOUNTER — TELEPHONE (OUTPATIENT)
Dept: PHARMACY | Facility: CLINIC | Age: 60
End: 2025-06-10
Payer: COMMERCIAL

## 2025-06-10 NOTE — TELEPHONE ENCOUNTER
[facility]/Population Health Chart Review & Patient Outreach Details For Medication Adherence Project    Reason for Outreach Encounter: 3rd Party payor non-compliance report ([employer], [employer], [employer], etc)  2.  Patient Outreach Method: Reviewed patient chart   3.   Medication in question:    Hyperlipidemia Medications              rosuvastatin (CRESTOR) 20 MG tablet Take 1 tablet (20 mg total) by mouth once daily.                  LF 90 ds 5/7/25    4.  Reviewed and or Updates Made To: Patient Chart  5. Outreach Outcomes and/or actions taken: Patient filled medication and is on track to be adherent  Additional Notes:

## 2025-07-22 ENCOUNTER — TELEPHONE (OUTPATIENT)
Dept: PHARMACY | Facility: CLINIC | Age: 60
End: 2025-07-22
Payer: COMMERCIAL

## 2025-08-04 ENCOUNTER — TELEPHONE (OUTPATIENT)
Dept: PHARMACY | Facility: CLINIC | Age: 60
End: 2025-08-04
Payer: COMMERCIAL

## (undated) DEVICE — ADHESIVE MASTISOL VIAL 0523-48

## (undated) DEVICE — SOLUTION IRRI NS BOTTLE 1000ML R5200-01

## (undated) DEVICE — SUTURE MONOCRYL 3-0 27 PS-1 MCP936H

## (undated) DEVICE — SOLUTION NACL 0.9% 3000ML

## (undated) DEVICE — PAD ABD 5X9   7196D

## (undated) DEVICE — TUBING CYSTO DOUBLE 654301

## (undated) DEVICE — GLOVE BIOGEL PI GOLD SZ  8.5

## (undated) DEVICE — Device

## (undated) DEVICE — CUFF TOURNIQUET 34DUAL PRT 5921-034-235

## (undated) DEVICE — SPONGE GAUZE 10S 4X4  442214

## (undated) DEVICE — BANDAGE ACE STERILE 3 REB3113

## (undated) DEVICE — PADDING CAST 3 STERILE 30-320

## (undated) DEVICE — PACK ARTHROSCOPY SMHS009-07

## (undated) DEVICE — PAD BOVIE ADULT

## (undated) DEVICE — STERISTRIP 1/4 SKIN CLOSURE

## (undated) DEVICE — SUTURE VICRYL 2-0 CT-1 36 VCP945H

## (undated) DEVICE — DRESSING ADAPTIC 3X8 2015

## (undated) DEVICE — UNDERGLOVE BIOGEL MICRO BLUE SZ 8.5